# Patient Record
Sex: FEMALE | Race: WHITE | NOT HISPANIC OR LATINO | Employment: FULL TIME | URBAN - METROPOLITAN AREA
[De-identification: names, ages, dates, MRNs, and addresses within clinical notes are randomized per-mention and may not be internally consistent; named-entity substitution may affect disease eponyms.]

---

## 2023-05-31 ENCOUNTER — TRANSCRIBE ORDERS (OUTPATIENT)
Dept: ADMINISTRATIVE | Facility: HOSPITAL | Age: 38
End: 2023-05-31

## 2023-05-31 ENCOUNTER — HOSPITAL ENCOUNTER (OUTPATIENT)
Dept: CARDIOLOGY | Facility: HOSPITAL | Age: 38
Discharge: HOME | End: 2023-05-31

## 2023-05-31 ENCOUNTER — LAB (OUTPATIENT)
Dept: LAB | Facility: HOSPITAL | Age: 38
End: 2023-05-31

## 2023-05-31 DIAGNOSIS — Z01.818 PRE-OP TESTING: ICD-10-CM

## 2023-05-31 DIAGNOSIS — M25.562 LEFT KNEE PAIN, UNSPECIFIED CHRONICITY: Primary | ICD-10-CM

## 2023-05-31 DIAGNOSIS — M25.562 LEFT KNEE PAIN, UNSPECIFIED CHRONICITY: ICD-10-CM

## 2023-05-31 LAB
ANION GAP SERPL CALCULATED.3IONS-SCNC: 11.7 MMOL/L (ref 5–15)
BUN SERPL-MCNC: 6 MG/DL (ref 6–20)
BUN/CREAT SERPL: 7.3 (ref 7–25)
CALCIUM SPEC-SCNC: 10 MG/DL (ref 8.6–10.5)
CHLORIDE SERPL-SCNC: 101 MMOL/L (ref 98–107)
CO2 SERPL-SCNC: 28.3 MMOL/L (ref 22–29)
CREAT SERPL-MCNC: 0.82 MG/DL (ref 0.57–1)
DEPRECATED RDW RBC AUTO: 40 FL (ref 37–54)
EGFRCR SERPLBLD CKD-EPI 2021: 94.6 ML/MIN/1.73
ERYTHROCYTE [DISTWIDTH] IN BLOOD BY AUTOMATED COUNT: 12.5 % (ref 12.3–15.4)
GLUCOSE SERPL-MCNC: 120 MG/DL (ref 65–99)
HBA1C MFR BLD: 5.9 % (ref 4.8–5.6)
HCT VFR BLD AUTO: 42.9 % (ref 34–46.6)
HGB BLD-MCNC: 13.9 G/DL (ref 12–15.9)
MCH RBC QN AUTO: 28.5 PG (ref 26.6–33)
MCHC RBC AUTO-ENTMCNC: 32.4 G/DL (ref 31.5–35.7)
MCV RBC AUTO: 88.1 FL (ref 79–97)
PLATELET # BLD AUTO: 428 10*3/MM3 (ref 140–450)
PMV BLD AUTO: 8.8 FL (ref 6–12)
POTASSIUM SERPL-SCNC: 4.3 MMOL/L (ref 3.5–5.2)
QT INTERVAL: 363 MS
RBC # BLD AUTO: 4.87 10*6/MM3 (ref 3.77–5.28)
SODIUM SERPL-SCNC: 141 MMOL/L (ref 136–145)
WBC NRBC COR # BLD: 6.65 10*3/MM3 (ref 3.4–10.8)

## 2023-05-31 PROCEDURE — 83036 HEMOGLOBIN GLYCOSYLATED A1C: CPT

## 2023-05-31 PROCEDURE — 80048 BASIC METABOLIC PNL TOTAL CA: CPT

## 2023-05-31 PROCEDURE — 85027 COMPLETE CBC AUTOMATED: CPT

## 2023-05-31 PROCEDURE — 36415 COLL VENOUS BLD VENIPUNCTURE: CPT

## 2023-05-31 PROCEDURE — 93005 ELECTROCARDIOGRAM TRACING: CPT | Performed by: ORTHOPAEDIC SURGERY

## 2023-08-14 ENCOUNTER — OFFICE VISIT (OUTPATIENT)
Dept: OBSTETRICS AND GYNECOLOGY | Facility: CLINIC | Age: 38
End: 2023-08-14
Payer: COMMERCIAL

## 2023-08-14 VITALS
BODY MASS INDEX: 37.74 KG/M2 | SYSTOLIC BLOOD PRESSURE: 150 MMHG | DIASTOLIC BLOOD PRESSURE: 74 MMHG | HEIGHT: 68 IN | WEIGHT: 249 LBS

## 2023-08-14 DIAGNOSIS — Z01.419 ENCOUNTER FOR GYNECOLOGICAL EXAMINATION WITHOUT ABNORMAL FINDING: Primary | ICD-10-CM

## 2023-08-14 RX ORDER — DICYCLOMINE HCL 20 MG
20 TABLET ORAL
COMMUNITY
Start: 2023-04-11

## 2023-08-14 RX ORDER — BUPROPION HYDROCHLORIDE 150 MG/1
150 TABLET ORAL DAILY
COMMUNITY
Start: 2023-07-03

## 2023-08-14 RX ORDER — VITAMIN A ACETATE, BETA CAROTENE, ASCORBIC ACID, CHOLECALCIFEROL, .ALPHA.-TOCOPHEROL ACETATE, DL-, THIAMINE MONONITRATE, RIBOFLAVIN, NIACINAMIDE, PYRIDOXINE HYDROCHLORIDE, FOLIC ACID, CYANOCOBALAMIN, CALCIUM CARBONATE, FERROUS FUMARATE, ZINC OXIDE, CUPRIC OXIDE 3080; 12; 120; 400; 1; 1.84; 3; 20; 22; 920; 25; 200; 27; 10; 2 [IU]/1; UG/1; MG/1; [IU]/1; MG/1; MG/1; MG/1; MG/1; MG/1; [IU]/1; MG/1; MG/1; MG/1; MG/1; MG/1
TABLET, FILM COATED ORAL DAILY
COMMUNITY

## 2023-08-14 RX ORDER — PANTOPRAZOLE SODIUM 20 MG/1
20 TABLET, DELAYED RELEASE ORAL DAILY
COMMUNITY
Start: 2021-04-01

## 2023-08-14 RX ORDER — NATEGLINIDE 120 MG/1
TABLET ORAL
COMMUNITY
Start: 2014-01-14

## 2023-08-14 RX ORDER — KETOCONAZOLE 20 MG/G
CREAM TOPICAL
COMMUNITY

## 2023-08-14 RX ORDER — ETHYNODIOL DIACETATE AND ETHINYL ESTRADIOL 1 MG-35MCG
1 KIT ORAL DAILY
Qty: 84 TABLET | Refills: 3 | Status: SHIPPED | OUTPATIENT
Start: 2023-08-14

## 2023-08-14 RX ORDER — DEXTROAMPHETAMINE SACCHARATE, AMPHETAMINE ASPARTATE, DEXTROAMPHETAMINE SULFATE, AND AMPHETAMINE SULFATE 7.5; 7.5; 7.5; 7.5 MG/1; MG/1; MG/1; MG/1
30 TABLET ORAL 2 TIMES DAILY
COMMUNITY
Start: 2023-03-12

## 2023-08-14 RX ORDER — LANCETS 30 GAUGE
EACH MISCELLANEOUS
COMMUNITY

## 2023-08-14 RX ORDER — PIOGLITAZONEHYDROCHLORIDE 45 MG/1
1 TABLET ORAL DAILY
COMMUNITY
Start: 2020-07-01

## 2023-08-14 RX ORDER — FERROUS SULFATE 325(65) MG
325 TABLET ORAL DAILY
COMMUNITY

## 2023-08-14 RX ORDER — EXENATIDE 2 MG/.85ML
INJECTION, SUSPENSION, EXTENDED RELEASE SUBCUTANEOUS
COMMUNITY
Start: 2018-09-01

## 2023-08-14 RX ORDER — AMLODIPINE AND BENAZEPRIL HYDROCHLORIDE 10; 40 MG/1; MG/1
1 CAPSULE ORAL DAILY
COMMUNITY
Start: 2019-01-01

## 2023-08-14 RX ORDER — BUDESONIDE AND FORMOTEROL FUMARATE DIHYDRATE 80; 4.5 UG/1; UG/1
2 AEROSOL RESPIRATORY (INHALATION) 2 TIMES DAILY
COMMUNITY

## 2023-08-14 RX ORDER — ETHYNODIOL DIACETATE AND ETHINYL ESTRADIOL 1 MG-35MCG
1 KIT ORAL DAILY
COMMUNITY
End: 2023-08-14 | Stop reason: SDUPTHER

## 2023-08-14 NOTE — PROGRESS NOTES
GYN Annual Exam     CC- Here for annual exam.     Lavern Rehman is a 38 y.o. female who presents for annual well woman exam. Periods are regular every 28-30 days, lasting a few days. Dysmenorrhea:none. Cyclic symptoms include none. No intermenstrual bleeding, spotting, or discharge.    Patient is a former patient of mine.  We discussed her other medical history and her future desire to come off oral contraceptives and begin pregnancy efforts.  She states that she is already taking a prenatal vitamin.  She has made a nice effort at weight loss and has lost 40 pounds, somewhat setback by recent left knee surgery.  We discussed her metformin use and that that is safe and I would have her continue on that and she states that her recent hemoglobin A1c is gone down from 9.5 range to 5.6 range.  We discussed her chronic hypertension and she has already discussed this with her primary care physician and we will work on getting onto a different antihypertensive from her current ACE inhibitor calcium channel blocker combination.  Before she actually makes effort toward conception.  She has already talked to her mental health provider and will come off of her Adderall and her Wellbutrin before making any pregnancy efforts and I told her that these issues could certainly be revisited as the pregnancy progresses.  She presently wants to stay on her current oral contraceptive and may not be starting pregnancy efforts for about another 6 months.    OB History          1    Para        Term                AB   1    Living             SAB   1    IAB        Ectopic        Molar        Multiple        Live Births                    Current contraception: OCP (estrogen/progesterone)  History of abnormal Pap smear: no  Family history of uterine, colon or ovarian cancer: no  History of abnormal mammogram: no  Family history of breast cancer: yes - maternal grandmother  Last Pap :     Past Medical History:   Diagnosis  Date    Diabetes     TERE (generalized anxiety disorder)     GERD (gastroesophageal reflux disease)     HTN (hypertension)        Past Surgical History:   Procedure Laterality Date    DILATATION AND CURETTAGE      KNEE SURGERY      LAPAROSCOPIC CHOLECYSTECTOMY      SHOULDER SURGERY           Current Outpatient Medications:     Adderall 30 MG tablet, Take 1 tablet by mouth 2 (Two) Times a Day., Disp: , Rfl:     amLODIPine-benazepril (LOTREL) 10-40 MG per capsule, Take 1 capsule by mouth Daily., Disp: , Rfl:     budesonide-formoterol (SYMBICORT) 80-4.5 MCG/ACT inhaler, Inhale 2 puffs 2 (Two) Times a Day., Disp: , Rfl:     buPROPion XL (WELLBUTRIN XL) 150 MG 24 hr tablet, Take 1 tablet by mouth Daily., Disp: , Rfl:     dicyclomine (BENTYL) 20 MG tablet, Take 1 tablet by mouth., Disp: , Rfl:     exenatide er (Bydureon BCise) 2 MG/0.85ML auto-injector injection, INJECT 2 MG INTO THE SKIN ONCE A WEEK., Disp: , Rfl:     ferrous sulfate 325 (65 FE) MG tablet, Take 1 tablet by mouth Daily., Disp: , Rfl:     glucose blood test strip, OneTouch Ultra Test strips  1 STRIP BY OTHER ROUTE 2 (TWO) TIMES DAILY., Disp: , Rfl:     Kelnor 1/35 1-35 MG-MCG per tablet, Take 1 tablet by mouth Daily., Disp: 84 tablet, Rfl: 3    ketoconazole (NIZORAL) 2 % cream, prn, Disp: , Rfl:     metFORMIN (GLUCOPHAGE) 850 MG tablet, Take 1 tablet by mouth Daily With Breakfast., Disp: , Rfl:     nateglinide (STARLIX) 120 MG tablet, Take  by mouth., Disp: , Rfl:     OneTouch Delica Lancets 30G misc, OneTouch Delica Lancets 33 gauge  INJECT TWICE DAILY WITH STRIPS, Disp: , Rfl:     pantoprazole (PROTONIX) 20 MG EC tablet, Take 1 tablet by mouth Daily., Disp: , Rfl:     pioglitazone (ACTOS) 45 MG tablet, Take 1 tablet by mouth Daily., Disp: , Rfl:     prenatal vitamins (PRENATAL 27-1) 27-1 MG tablet tablet, Take  by mouth Daily., Disp: , Rfl:     PROVENTIL  (90 BASE) MCG/ACT inhaler, INHALE 1 PUFF EVERY 4 HOURS AS NEEDED., Disp: 6.7 inhaler, Rfl:  "0    Allergies   Allergen Reactions    Shellfish-Derived Products Hives     N/v.    Shellfish Allergy Hives    Oxycodone-Acetaminophen Hives, Itching and Rash       Social History     Tobacco Use    Smoking status: Never    Smokeless tobacco: Never   Vaping Use    Vaping Use: Every day    Substances: Nicotine, Flavoring    Devices: Disposable   Substance Use Topics    Alcohol use: Not Currently    Drug use: Not Currently       Family History   Problem Relation Age of Onset    Breast cancer Maternal Grandmother     Ovarian cancer Neg Hx     Uterine cancer Neg Hx     Colon cancer Neg Hx        Review of Systems   Constitutional:  Negative for fatigue and fever.   Respiratory:  Negative for cough.    Genitourinary:  Negative for menstrual problem and pelvic pain.     /74   Ht 172.7 cm (68\")   Wt 113 kg (249 lb)   LMP 07/22/2023   BMI 37.86 kg/mý     Physical Exam  Genitourinary:      Bladder and urethral meatus normal.      No lesions in the vagina.      Right Labia: No lesions.     Left Labia: No lesions.     No vaginal discharge, tenderness or bleeding.      No vaginal prolapse present.     No vaginal atrophy present.       Right Adnexa: not tender, not full and no mass present.     Left Adnexa: not tender, not full and no mass present.     No cervical motion tenderness, discharge, friability or lesion.      Uterus is not enlarged or fixed.      No uterine mass detected.     Uterus is midaxial.      Uterus is not absent.  Breasts:     Right: No mass, nipple discharge, skin change or tenderness.      Left: No mass, nipple discharge, skin change or tenderness.   Neck:      Thyroid: No thyroid mass or thyromegaly.   Abdominal:      General: Abdomen is flat.      Palpations: Abdomen is soft. There is no mass.      Tenderness: There is no abdominal tenderness.   Neurological:      Mental Status: She is alert.   Vitals reviewed.             Assessment     1) GYN annual well woman exam.  Desiring pregnancy in the " near future.  See HPI for discussion.  2) chronic hypertension  3) type 2 diabetes, well controlled on metformin  4) attention deficit disorder currently on Adderall and Wellbutrin     Plan     1) Breast Health - Clinical breast exam yearly, Discussed [employer] cancer society recommendations for breast cancer screening, and Self breast awareness monthly  2) Pap -not indicated as it was done in 2022  3) Smoking status-negative  4) Encouraged to be wary of information obtained via social media and internet based on source and search.  5) Follow up prn and one year.       Darius Uribe MD   8/14/2023  10:42 EDT

## 2023-08-16 ENCOUNTER — PATIENT ROUNDING (BHMG ONLY) (OUTPATIENT)
Dept: OBSTETRICS AND GYNECOLOGY | Facility: CLINIC | Age: 38
End: 2023-08-16
Payer: COMMERCIAL

## 2023-08-16 ENCOUNTER — PATIENT MESSAGE (OUTPATIENT)
Dept: OBSTETRICS AND GYNECOLOGY | Facility: CLINIC | Age: 38
End: 2023-08-16
Payer: COMMERCIAL

## 2023-08-16 NOTE — PROGRESS NOTES
My chart message has been sent to the patient for PATIENT ROUNDING with [facility].

## 2024-03-25 DIAGNOSIS — N92.6 MISSED MENSES: Primary | ICD-10-CM

## 2024-03-26 ENCOUNTER — TELEPHONE (OUTPATIENT)
Dept: OBSTETRICS AND GYNECOLOGY | Facility: CLINIC | Age: 39
End: 2024-03-26
Payer: COMMERCIAL

## 2024-03-26 LAB — HCG INTACT+B SERPL-ACNC: 500 MIU/ML

## 2024-04-01 DIAGNOSIS — N92.6 MISSED MENSES: Primary | ICD-10-CM

## 2024-04-02 LAB — HCG INTACT+B SERPL-ACNC: NORMAL MIU/ML

## 2024-04-15 ENCOUNTER — INITIAL PRENATAL (OUTPATIENT)
Dept: OBSTETRICS AND GYNECOLOGY | Facility: CLINIC | Age: 39
End: 2024-04-15
Payer: COMMERCIAL

## 2024-04-15 VITALS — SYSTOLIC BLOOD PRESSURE: 124 MMHG | WEIGHT: 270.2 LBS | BODY MASS INDEX: 41.08 KG/M2 | DIASTOLIC BLOOD PRESSURE: 74 MMHG

## 2024-04-15 DIAGNOSIS — Z34.91 INITIAL OBSTETRIC VISIT IN FIRST TRIMESTER: Primary | ICD-10-CM

## 2024-04-15 DIAGNOSIS — O09.519 ADVANCED MATERNAL AGE, PRIMIGRAVIDA, ANTEPARTUM: ICD-10-CM

## 2024-04-15 DIAGNOSIS — O16.9 HYPERTENSION AFFECTING PREGNANCY, ANTEPARTUM: ICD-10-CM

## 2024-04-15 DIAGNOSIS — Z3A.01 7 WEEKS GESTATION OF PREGNANCY: ICD-10-CM

## 2024-04-15 DIAGNOSIS — N91.2 AMENORRHEA: ICD-10-CM

## 2024-04-15 DIAGNOSIS — O24.919 DIABETES MELLITUS COMPLICATING PREGNANCY, UNSPECIFIED TRIMESTER: ICD-10-CM

## 2024-04-15 DIAGNOSIS — O99.210 OBESITY IN PREGNANCY, ANTEPARTUM: ICD-10-CM

## 2024-04-15 LAB
GLUCOSE UR STRIP-MCNC: NEGATIVE MG/DL
PROT UR STRIP-MCNC: NEGATIVE MG/DL

## 2024-04-15 PROCEDURE — 0501F PRENATAL FLOW SHEET: CPT | Performed by: OBSTETRICS & GYNECOLOGY

## 2024-04-15 RX ORDER — ASPIRIN 81 MG/1
81 TABLET, CHEWABLE ORAL DAILY
Qty: 60 TABLET | Refills: 3 | Status: SHIPPED | OUTPATIENT
Start: 2024-04-15

## 2024-04-15 NOTE — PROGRESS NOTES
Initial ob visit     CC- Here to initiate prenatal care.    Lavern Rehman is being seen today for her first obstetrical visit.  She is a 39 y.o.    7w1d gestation.   Patient with no complaints today.  She does have some nausea and some fatigue but is mild.  She is sure about her menses but did have some early bleeding.  She is on metformin for type 2 diabetes and reports good glucose control with postprandial at about 120 and fasting this morning of 95.  She has recently been changed to amlodipine by her primary care provider knowing that she was planning pregnancy.  We discussed risks associated with chronic hypertension, advanced maternal age and type 2 diabetes.  We discussed routine follow-up and did recommend maternal-fetal medicine consultation given the above risk factors.  We discussed closer monitoring in the third trimester with usually weekly visits with some form of  testing beginning at 32 weeks.    # 1 - Date: None, Sex: None, Weight: None, GA: None, Type: None, Apgar1: None, Apgar5: None, Living: None, Birth Comments: None    # 2 - Date: None, Sex: None, Weight: None, GA: None, Type: None, Apgar1: None, Apgar5: None, Living: None, Birth Comments: None      Current obstetric complaints : Fatigue      Prior obstetric issues, potential pregnancy concerns: Advanced maternal age, chronic hypertension, type 2 diabetes  Family history of genetic issues (includes FOB): Negative  Prior infections concerning in pregnancy (Rash, fever in last 2 weeks): Negative  Varicella Hx -unknown  Prior testing for Cystic Fibrosis Carrier or Sickle Cell Trait-unknown  Prepregnancy BMI - Body mass index is 41.08 kg/m².  Hx of HSV for patient or partner : Negative    Past Medical History:   Diagnosis Date    Diabetes     TERE (generalized anxiety disorder)     GERD (gastroesophageal reflux disease)     HTN (hypertension)     Hypertension        Past Surgical History:   Procedure Laterality Date    DILATATION  AND CURETTAGE      KNEE SURGERY      LAPAROSCOPIC CHOLECYSTECTOMY      SHOULDER SURGERY           Current Outpatient Medications:     budesonide-formoterol (SYMBICORT) 80-4.5 MCG/ACT inhaler, Inhale 2 puffs 2 (Two) Times a Day., Disp: , Rfl:     ferrous sulfate 325 (65 FE) MG tablet, Take 1 tablet by mouth Daily., Disp: , Rfl:     glucose blood test strip, OneTouch Ultra Test strips  1 STRIP BY OTHER ROUTE 2 (TWO) TIMES DAILY., Disp: , Rfl:     metFORMIN (GLUCOPHAGE) 850 MG tablet, Take 1 tablet by mouth Daily With Breakfast., Disp: , Rfl:     OneTouch Delica Lancets 30G misc, OneTouch Delica Lancets 33 gauge  INJECT TWICE DAILY WITH STRIPS, Disp: , Rfl:     pantoprazole (PROTONIX) 20 MG EC tablet, Take 1 tablet by mouth Daily., Disp: , Rfl:     pioglitazone (ACTOS) 45 MG tablet, Take 1 tablet by mouth Daily., Disp: , Rfl:     prenatal vitamins (PRENATAL 27-1) 27-1 MG tablet tablet, Take  by mouth Daily., Disp: , Rfl:     PROVENTIL  (90 BASE) MCG/ACT inhaler, INHALE 1 PUFF EVERY 4 HOURS AS NEEDED., Disp: 6.7 inhaler, Rfl: 0    Adderall 30 MG tablet, Take 1 tablet by mouth 2 (Two) Times a Day., Disp: , Rfl:     amLODIPine-benazepril (LOTREL) 10-40 MG per capsule, Take 1 capsule by mouth Daily., Disp: , Rfl:     aspirin 81 MG chewable tablet, Chew 1 tablet Daily., Disp: 60 tablet, Rfl: 3    buPROPion XL (WELLBUTRIN XL) 150 MG 24 hr tablet, Take 1 tablet by mouth Daily., Disp: , Rfl:     dicyclomine (BENTYL) 20 MG tablet, Take 1 tablet by mouth. (Patient not taking: Reported on 4/15/2024), Disp: , Rfl:     exenatide er (Bydureon BCise) 2 MG/0.85ML auto-injector injection, INJECT 2 MG INTO THE SKIN ONCE A WEEK., Disp: , Rfl:     Kelnor 1/35 1-35 MG-MCG per tablet, Take 1 tablet by mouth Daily., Disp: 84 tablet, Rfl: 3    ketoconazole (NIZORAL) 2 % cream, prn (Patient not taking: Reported on 4/15/2024), Disp: , Rfl:     nateglinide (STARLIX) 120 MG tablet, Take  by mouth., Disp: , Rfl:     Allergies   Allergen  Reactions    Shellfish-Derived Products Hives     N/v.    Shellfish Allergy Hives    Oxycodone-Acetaminophen Hives, Itching and Rash       Social History     Socioeconomic History    Marital status:    Tobacco Use    Smoking status: Never    Smokeless tobacco: Never   Vaping Use    Vaping status: Former   Substance and Sexual Activity    Alcohol use: Not Currently    Drug use: Not Currently    Sexual activity: Yes     Partners: Male     Birth control/protection: Birth control pill       Family History   Problem Relation Age of Onset    Breast cancer Maternal Grandmother     Ovarian cancer Neg Hx     Uterine cancer Neg Hx     Colon cancer Neg Hx        Review of systems     Constitutional : Nausea, fatigue mild   : Vaginal bleeding, cramping negative  Breast Tenderness : Mild  All other systems reviewed and are negative       Objective    /74   Wt 123 kg (270 lb 3.2 oz)   LMP 02/25/2024 (Exact Date)   BMI 41.08 kg/m²       General Appearance:    Alert, cooperative, in no acute distress, habitus obese   Head:    Normocephalic, without obvious abnormality, atraumatic   Eyes:            Lids and lashes normal, conjunctivae and sclerae normal, no   icterus, no pallor, corneas clear   Ears:    Ears appear intact with no abnormalities noted       Neck:   no thyromegaly, no mass.   Back:                         Lungs:     Clear to auscultation,respirations regular, even and     unlabored    Heart:    Regular rhythm and normal rate, normal S1 and S2, no            murmur, no gallop, no rub, no click   Breast Exam:    No masses, No nipple discharge   Abdomen:     Normal bowel sounds, no masses, no organomegaly, soft        non-tender, non-distended, no guarding, no rebound                 tenderness   Genitalia:       Extremities:   Moves all extremities well, no edema, no cyanosis, no              redness   Pulses:   Pulses palpable and equal bilaterally   Skin:   No bleeding, bruising or rash   Lymph  nodes:   No palpable adenopathy   Neurologic:   Sensation intact, A&O times 3      Assessment & Plan     Diagnoses and all orders for this visit:    1. Initial obstetric visit in first trimester (Primary)    2. 7 weeks gestation of pregnancy  -     POC Urinalysis Dipstick  -     OB Panel With HIV  -     Hemoglobin A1c  -     Chlamydia trachomatis, Neisseria gonorrhoeae, Trichomonas vaginalis, PCR - Urine, Urine, Clean Catch    3. Advanced maternal age, primigravida, antepartum    4. Diabetes mellitus complicating pregnancy, unspecified trimester    5. Hypertension affecting pregnancy, antepartum    6. Amenorrhea  -     POC Urinalysis Dipstick    7. Obesity in pregnancy, antepartum    Other orders  -     aspirin 81 MG chewable tablet; Chew 1 tablet Daily.  Dispense: 60 tablet; Refill: 3        1) Pregnancy at 7w1d  2) will get prenatal panel today along with hemoglobin A1c.  Will get her started toward maternal-fetal medicine consultation due to the above risk factors.  Will get dating ultrasound today as well.         Activity recommendation : 150 minutes/week of moderate intensity aerobic activity unless we limit for bleeding, hypertension or other pregnancy complication   Patient is on Prenatal vitamins  Problem list reviewed and updated.  Reviewed routine prenatal care with the office to include but not limited to   Zika (travel restrictions/ok to use insect repellant), not to changing cat litter, food restrictions, avoidance of alcohol, tobacco and drugs and saunas/hot tubs.   All questions answered.     Darius Uribe MD   4/15/2024  11:17 EDT

## 2024-04-16 LAB
ABO GROUP BLD: ABNORMAL
BASOPHILS # BLD AUTO: 0.1 X10E3/UL (ref 0–0.2)
BASOPHILS NFR BLD AUTO: 1 %
BLD GP AB SCN SERPL QL: NEGATIVE
EOSINOPHIL # BLD AUTO: 0.1 X10E3/UL (ref 0–0.4)
EOSINOPHIL NFR BLD AUTO: 1 %
ERYTHROCYTE [DISTWIDTH] IN BLOOD BY AUTOMATED COUNT: 12.6 % (ref 11.7–15.4)
HBA1C MFR BLD: 6.5 % (ref 4.8–5.6)
HBV SURFACE AG SERPL QL IA: NEGATIVE
HCT VFR BLD AUTO: 38.3 % (ref 34–46.6)
HCV IGG SERPL QL IA: NON REACTIVE
HGB BLD-MCNC: 12.6 G/DL (ref 11.1–15.9)
HIV 1+2 AB+HIV1 P24 AG SERPL QL IA: NON REACTIVE
IMM GRANULOCYTES # BLD AUTO: 0.1 X10E3/UL (ref 0–0.1)
IMM GRANULOCYTES NFR BLD AUTO: 1 %
LYMPHOCYTES # BLD AUTO: 2.3 X10E3/UL (ref 0.7–3.1)
LYMPHOCYTES NFR BLD AUTO: 28 %
MCH RBC QN AUTO: 29.5 PG (ref 26.6–33)
MCHC RBC AUTO-ENTMCNC: 32.9 G/DL (ref 31.5–35.7)
MCV RBC AUTO: 90 FL (ref 79–97)
MONOCYTES # BLD AUTO: 0.5 X10E3/UL (ref 0.1–0.9)
MONOCYTES NFR BLD AUTO: 7 %
NEUTROPHILS # BLD AUTO: 4.9 X10E3/UL (ref 1.4–7)
NEUTROPHILS NFR BLD AUTO: 62 %
PLATELET # BLD AUTO: 385 X10E3/UL (ref 150–450)
RBC # BLD AUTO: 4.27 X10E6/UL (ref 3.77–5.28)
RH BLD: POSITIVE
RPR SER QL: NON REACTIVE
RUBV IGG SERPL IA-ACNC: <0.9 INDEX
WBC # BLD AUTO: 7.9 X10E3/UL (ref 3.4–10.8)

## 2024-04-17 LAB
C TRACH RRNA SPEC QL NAA+PROBE: NEGATIVE
N GONORRHOEA RRNA SPEC QL NAA+PROBE: NEGATIVE
T VAGINALIS RRNA SPEC QL NAA+PROBE: NEGATIVE

## 2024-04-18 LAB
AMPHETAMINES UR QL SCN: NEGATIVE NG/ML
BARBITURATES UR QL SCN: NEGATIVE NG/ML
BENZODIAZ UR QL: NEGATIVE NG/ML
BZE UR QL: NEGATIVE NG/ML
CANNABINOIDS UR QL SCN: NEGATIVE NG/ML
METHADONE UR QL SCN: NEGATIVE NG/ML
OPIATES UR QL: NEGATIVE NG/ML
PCP UR QL SCN: NEGATIVE NG/ML
PROPOXYPH UR QL SCN: NEGATIVE NG/ML

## 2024-04-18 RX ORDER — NITROFURANTOIN 25; 75 MG/1; MG/1
100 CAPSULE ORAL 2 TIMES DAILY
Qty: 10 CAPSULE | Refills: 0 | Status: SHIPPED | OUTPATIENT
Start: 2024-04-18 | End: 2024-04-23

## 2024-04-18 NOTE — PROGRESS NOTES
Lavern, your recent urine culture is consistent with a urinary tract infection.  The final culture result is still pending.  I have sent an antibiotic in for you to start taking twice daily for 5 days.

## 2024-04-20 LAB
BACTERIA UR CULT: ABNORMAL
BACTERIA UR CULT: ABNORMAL
OTHER ANTIBIOTIC SUSC ISLT: ABNORMAL

## 2024-04-23 ENCOUNTER — TELEPHONE (OUTPATIENT)
Dept: OBSTETRICS AND GYNECOLOGY | Facility: CLINIC | Age: 39
End: 2024-04-23
Payer: COMMERCIAL

## 2024-04-23 RX ORDER — LANCETS 30 GAUGE
1 EACH MISCELLANEOUS TAKE AS DIRECTED
Qty: 200 EACH | Refills: 10 | Status: SHIPPED | OUTPATIENT
Start: 2024-04-23

## 2024-04-23 NOTE — TELEPHONE ENCOUNTER
Call placed to Lavern to review [facility] diabetes management. Pt reports she is currently not checking blood sugars at this time. Pt notes she has worked very hard on dietary modifications to bring her A1C down but is not checking blood sugars regularly. Notes that when her blood sugar is elevated she feels symptomatic. Discussed with pt MFM management and checking blood sugars 7 times a day: before meals, one hour after meals and before bedtime snack. Pt agreeable to this adjustment and will start to check blood sugars more regularly.     Discussed carb goals and eating every 2-3 hours with 30g carbs for breakfast, 45g carbs for lunch and dinner and 10-15g carbs with snacks in between meals and at bedtime. Pt verbalized understanding. Glucose goals of 60-90 before meals and  one hour after meals were reviewed. Discussed with patient that if glucose values are consistently outside of the targeted range, [facility] will discuss adjusting her medications. Pt currently taking Metformin 850mg BID and Actos 45mg daily. Encouraged pt to start checking blood sugars 7 times a day and bring glucose logs to her upcoming appointment. Lavern verbalized understanding.     [employer] (acquired by [employer] in 2014) message sent to patient with [facility] glucose log attached. Will provide patient with [facility] folder with diabetes packet, hypoglycemia protocol and extra glucose logs at time of appointment. All questions answered at time of call and encouraged patient to call this RN with any additional questions prior to her appointment. [facility] looks forward to seeing pt on 5/20. Will do weekly glucose checks every Monday.

## 2024-04-29 ENCOUNTER — REMOTE PATIENT MONITORING (OUTPATIENT)
Dept: OBSTETRICS AND GYNECOLOGY | Facility: CLINIC | Age: 39
End: 2024-04-29
Payer: COMMERCIAL

## 2024-04-29 ENCOUNTER — DOCUMENTATION (OUTPATIENT)
Dept: OBSTETRICS AND GYNECOLOGY | Facility: CLINIC | Age: 39
End: 2024-04-29

## 2024-04-29 RX ORDER — METFORMIN HYDROCHLORIDE 500 MG/1
1000 TABLET, EXTENDED RELEASE ORAL
Qty: 120 TABLET | Refills: 8 | Status: SHIPPED | OUTPATIENT
Start: 2024-04-29

## 2024-04-29 RX ORDER — GLYBURIDE 2.5 MG/1
2.5 TABLET ORAL NIGHTLY
Qty: 30 TABLET | Refills: 8 | Status: SHIPPED | OUTPATIENT
Start: 2024-04-29 | End: 2025-01-24

## 2024-04-29 NOTE — PROGRESS NOTES
Weekly MFM glucose log evaluation. The following adjustments were made:    Metformin: 1000mg BID (breakfast and dinner)  Glyburide: 2.5mg QHS    Call placed to Sandor ayon to review new medication recommendations. No answer at time of calls, voicemail left with call back number: [phone]. [employer] message sent explaining new medications. MFM will review glucose logs again on 5/6.

## 2024-04-30 ENCOUNTER — TRANSCRIBE ORDERS (OUTPATIENT)
Dept: ULTRASOUND IMAGING | Facility: HOSPITAL | Age: 39
End: 2024-04-30
Payer: COMMERCIAL

## 2024-04-30 DIAGNOSIS — O09.529 AMA (ADVANCED MATERNAL AGE) MULTIGRAVIDA 35+, UNSPECIFIED TRIMESTER: Primary | ICD-10-CM

## 2024-04-30 DIAGNOSIS — O16.9 HYPERTENSION AFFECTING PREGNANCY, ANTEPARTUM: ICD-10-CM

## 2024-04-30 DIAGNOSIS — O24.919 DIABETES MELLITUS AFFECTING PREGNANCY, UNSPECIFIED TRIMESTER: ICD-10-CM

## 2024-05-06 ENCOUNTER — TELEPHONE (OUTPATIENT)
Dept: OBSTETRICS AND GYNECOLOGY | Facility: CLINIC | Age: 39
End: 2024-05-06
Payer: COMMERCIAL

## 2024-05-10 ENCOUNTER — DOCUMENTATION (OUTPATIENT)
Dept: OBSTETRICS AND GYNECOLOGY | Facility: CLINIC | Age: 39
End: 2024-05-10
Payer: COMMERCIAL

## 2024-05-13 ENCOUNTER — ROUTINE PRENATAL (OUTPATIENT)
Dept: OBSTETRICS AND GYNECOLOGY | Facility: CLINIC | Age: 39
End: 2024-05-13
Payer: COMMERCIAL

## 2024-05-13 VITALS — WEIGHT: 278 LBS | SYSTOLIC BLOOD PRESSURE: 145 MMHG | DIASTOLIC BLOOD PRESSURE: 86 MMHG | BODY MASS INDEX: 42.27 KG/M2

## 2024-05-13 DIAGNOSIS — Z34.81 PRENATAL CARE, SUBSEQUENT PREGNANCY IN FIRST TRIMESTER: ICD-10-CM

## 2024-05-13 DIAGNOSIS — O24.919 DIABETES MELLITUS COMPLICATING PREGNANCY, UNSPECIFIED TRIMESTER: ICD-10-CM

## 2024-05-13 DIAGNOSIS — Z3A.11 11 WEEKS GESTATION OF PREGNANCY: Primary | ICD-10-CM

## 2024-05-13 DIAGNOSIS — O09.519 ADVANCED MATERNAL AGE, PRIMIGRAVIDA, ANTEPARTUM: ICD-10-CM

## 2024-05-13 DIAGNOSIS — O34.81 OVARIAN CYST AFFECTING PREGNANCY IN FIRST TRIMESTER, ANTEPARTUM: ICD-10-CM

## 2024-05-13 DIAGNOSIS — N83.209 OVARIAN CYST AFFECTING PREGNANCY IN FIRST TRIMESTER, ANTEPARTUM: ICD-10-CM

## 2024-05-13 DIAGNOSIS — O16.9 HYPERTENSION AFFECTING PREGNANCY, ANTEPARTUM: ICD-10-CM

## 2024-05-13 LAB
GLUCOSE UR STRIP-MCNC: NEGATIVE MG/DL
PROT UR STRIP-MCNC: NEGATIVE MG/DL

## 2024-05-13 PROCEDURE — 0502F SUBSEQUENT PRENATAL CARE: CPT | Performed by: OBSTETRICS & GYNECOLOGY

## 2024-05-13 RX ORDER — AMLODIPINE BESYLATE 10 MG/1
10 TABLET ORAL DAILY
COMMUNITY

## 2024-05-13 NOTE — PROGRESS NOTES
OB follow up     Lavern Rehman is a 39 y.o.  11w4d being seen today for her obstetrical visit.  Patient reports no complaints. Fetal movement: Too early.  No complaints.  No pain or vaginal bleeding.  Today's ultrasound shows the right simple cyst decreasing in size to about 5.2 cm x 4 cm.  There is a small area that looks consistent with possible subchorionic hematoma.  She is achieving better glucose control under the direction of the maternal-fetal medicine services.  She continues with her baby aspirin as well as the antihypertensive.    Her prenatal care is complicated by (and status): Obesity, chronic hypertension, type 2 diabetes, right ovarian cyst    Review of Systems  Cramping/contractions : Negative  Vaginal bleeding: Negative  Fetal movement too early    /86   Wt 126 kg (278 lb)   LMP 2024 (Exact Date)   BMI 42.27 kg/m²     FHT: 150 BPM   Uterine Size: size equals dates       Assessment    Diagnoses and all orders for this visit:    1. 11 weeks gestation of pregnancy (Primary)  -     POC Urinalysis Dipstick    2. Prenatal care, subsequent pregnancy in first trimester    3. Ovarian cyst affecting pregnancy in first trimester, antepartum    4. Advanced maternal age, primigravida, antepartum    5. Diabetes mellitus complicating pregnancy, unspecified trimester    6. Hypertension affecting pregnancy, antepartum        1) pregnancy at 11w4d         Plan    Reviewed this stage of pregnancy  Problem list updated   Follow up in 4 weeks.  Will also repeat ultrasound on that visit.  She has maternal-fetal medicine evaluation in about 1 week.    Darius Uribe MD   2024  12:22 EDT

## 2024-05-17 ENCOUNTER — DOCUMENTATION (OUTPATIENT)
Dept: OBSTETRICS AND GYNECOLOGY | Facility: CLINIC | Age: 39
End: 2024-05-17
Payer: COMMERCIAL

## 2024-05-17 LAB
CFDNA.FET/CFDNA.TOTAL SFR FETUS: NORMAL %
CITATION REF LAB TEST: NORMAL
FET 13+18+21+X+Y ANEUP PLAS.CFDNA: NEGATIVE
FET CHR 21 TS PLAS.CFDNA QL: NEGATIVE
FET SEX PLAS.CFDNA DOSAGE CFDNA: NORMAL
FET TS 13 RISK PLAS.CFDNA QL: NEGATIVE
FET TS 18 RISK WBC.DNA+CFDNA QL: NEGATIVE
GA EST FROM CONCEPTION DATE: NORMAL D
GESTATIONAL AGE > 9:: YES
LAB DIRECTOR NAME PROVIDER: NORMAL
LAB DIRECTOR NAME PROVIDER: NORMAL
LABORATORY COMMENT REPORT: NORMAL
LIMITATIONS OF THE TEST: NORMAL
NEGATIVE PREDICTIVE VALUE: NORMAL
NOTE: NORMAL
PERFORMANCE CHARACTERISTICS: NORMAL
POSITIVE PREDICTIVE VALUE: NORMAL
REF LAB TEST METHOD: NORMAL
TEST PERFORMANCE INFO SPEC: NORMAL

## 2024-05-17 NOTE — PROGRESS NOTES
Weekly MFM glucose log evaluation. No adjustments needed at this time. Patient to continue the following medication regimen:    Metformin: 1000mg BID  Glyburide 2.5mg HS    MFM will review glucose logs again on 5/20.

## 2024-05-20 ENCOUNTER — HOSPITAL ENCOUNTER (OUTPATIENT)
Dept: ULTRASOUND IMAGING | Facility: HOSPITAL | Age: 39
Discharge: HOME | End: 2024-05-20
Admitting: OBSTETRICS & GYNECOLOGY
Payer: COMMERCIAL

## 2024-05-20 ENCOUNTER — OFFICE VISIT (OUTPATIENT)
Dept: OBSTETRICS AND GYNECOLOGY | Facility: CLINIC | Age: 39
End: 2024-05-20
Payer: COMMERCIAL

## 2024-05-20 VITALS
BODY MASS INDEX: 42.89 KG/M2 | SYSTOLIC BLOOD PRESSURE: 132 MMHG | HEART RATE: 109 BPM | DIASTOLIC BLOOD PRESSURE: 60 MMHG | TEMPERATURE: 100 F | HEIGHT: 68 IN | WEIGHT: 283 LBS

## 2024-05-20 DIAGNOSIS — O24.919 DIABETES MELLITUS COMPLICATING PREGNANCY, UNSPECIFIED TRIMESTER: ICD-10-CM

## 2024-05-20 DIAGNOSIS — O09.529 AMA (ADVANCED MATERNAL AGE) MULTIGRAVIDA 35+, UNSPECIFIED TRIMESTER: ICD-10-CM

## 2024-05-20 DIAGNOSIS — O16.9 HYPERTENSION AFFECTING PREGNANCY, ANTEPARTUM: ICD-10-CM

## 2024-05-20 DIAGNOSIS — O09.519 ADVANCED MATERNAL AGE, PRIMIGRAVIDA, ANTEPARTUM: Primary | ICD-10-CM

## 2024-05-20 DIAGNOSIS — O24.919 DIABETES MELLITUS AFFECTING PREGNANCY, UNSPECIFIED TRIMESTER: ICD-10-CM

## 2024-05-20 PROBLEM — Q61.9 CONGENITAL CYSTIC KIDNEY DISEASE: Status: RESOLVED | Noted: 2024-05-20 | Resolved: 2024-05-20

## 2024-05-20 PROBLEM — E28.2 PCOS (POLYCYSTIC OVARIAN SYNDROME): Status: ACTIVE | Noted: 2019-06-13

## 2024-05-20 PROBLEM — E11.9 DIABETES MELLITUS: Status: ACTIVE | Noted: 2024-05-20

## 2024-05-20 PROBLEM — L73.2 HIDRADENITIS SUPPURATIVA: Status: ACTIVE | Noted: 2024-05-20

## 2024-05-20 PROBLEM — M75.02 ADHESIVE CAPSULITIS OF LEFT SHOULDER: Status: RESOLVED | Noted: 2022-09-08 | Resolved: 2024-05-20

## 2024-05-20 LAB — GLUCOSE BLDC GLUCOMTR-MCNC: 106 MG/DL (ref 70–130)

## 2024-05-20 PROCEDURE — 82948 REAGENT STRIP/BLOOD GLUCOSE: CPT

## 2024-05-20 PROCEDURE — 99214 OFFICE O/P EST MOD 30 MIN: CPT | Performed by: NURSE PRACTITIONER

## 2024-05-20 NOTE — PROGRESS NOTES
MATERNAL FETAL MEDICINE Consult Note    Dear Dr Darius Uribe MD:    Thank you for your kind referral of Lavern Rehman.  As you know, she is a 39 y.o. G 2 P 0010 at 12  4/7 weeks gestation (Estimated Date of Delivery: 11/28/24). This is a consult.     Her antepartum course is complicated by:  Chronic Hypertension  Type 2 DM  AMA    Aneuploidy Screening: NIPT - Negative    HPI: Today, she denies headache, blurry vision, RUQ pain. No vaginal bleeding, no contractions.     Review of History:  Past Medical History:   Diagnosis Date    Adhesive capsulitis of left shoulder 09/08/2022    Formatting of this note might be different from the original.   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    Diabetes     TERE (generalized anxiety disorder)     GERD (gastroesophageal reflux disease)     HTN (hypertension)     Hypertension      Past Surgical History:   Procedure Laterality Date    DILATATION AND CURETTAGE      KNEE SURGERY      LAPAROSCOPIC CHOLECYSTECTOMY      SHOULDER SURGERY       Social History     Socioeconomic History    Marital status:    Tobacco Use    Smoking status: Never    Smokeless tobacco: Never   Vaping Use    Vaping status: Former   Substance and Sexual Activity    Alcohol use: Not Currently    Drug use: Not Currently    Sexual activity: Yes     Partners: Male     Birth control/protection: Birth control pill     Family History   Problem Relation Age of Onset    Hypertension Father     Breast cancer Maternal Grandmother     Hypertension Maternal Grandfather     Ovarian cancer Neg Hx     Uterine cancer Neg Hx     Colon cancer Neg Hx       Allergies   Allergen Reactions    Shellfish-Derived Products Hives     N/v.    Shellfish Allergy Hives    Oxycodone-Acetaminophen Hives, Itching and Rash      Current Outpatient Medications on File Prior to Visit   Medication Sig Dispense Refill    amLODIPine (NORVASC) 10 MG tablet  Take 1 tablet by mouth Daily.      aspirin 81 MG chewable tablet Chew 1 tablet Daily. 60 tablet 3    budesonide-formoterol (SYMBICORT) 80-4.5 MCG/ACT inhaler Inhale 2 puffs 2 (Two) Times a Day.      ferrous sulfate 325 (65 FE) MG tablet Take 1 tablet by mouth Daily.      glucose blood test strip Use to check blood sugar 7 times a day. Patient has a One Touch Ultra 2 glucometer. 200 each 10    glyburide (DIAbeta) 2.5 MG tablet Take 1 tablet by mouth Every Night for 270 days. 30 tablet 8    ketoconazole (NIZORAL) 2 % cream prn      metFORMIN ER (GLUCOPHAGE-XR) 500 MG 24 hr tablet Take 2 tablets by mouth Daily With Breakfast & Dinner. 120 tablet 8    OneTouch Delica Lancets 30G misc 1 Lancet by Other route Take As Directed. Use to check blood sugar 7 times a day. 200 each 10    pantoprazole (PROTONIX) 20 MG EC tablet Take 1 tablet by mouth Daily.      prenatal vitamins (PRENATAL 27-1) 27-1 MG tablet tablet Take  by mouth Daily.      PROVENTIL  (90 BASE) MCG/ACT inhaler INHALE 1 PUFF EVERY 4 HOURS AS NEEDED. 6.7 inhaler 0    [DISCONTINUED] Adderall 30 MG tablet Take 1 tablet by mouth 2 (Two) Times a Day. (Patient not taking: Reported on 5/13/2024)      [DISCONTINUED] amLODIPine-benazepril (LOTREL) 10-40 MG per capsule Take 1 capsule by mouth Daily. (Patient not taking: Reported on 5/13/2024)      [DISCONTINUED] buPROPion XL (WELLBUTRIN XL) 150 MG 24 hr tablet Take 1 tablet by mouth Daily. (Patient not taking: Reported on 5/13/2024)      [DISCONTINUED] dicyclomine (BENTYL) 20 MG tablet Take 1 tablet by mouth. (Patient not taking: Reported on 4/15/2024)      [DISCONTINUED] exenatide er (Bydureon BCise) 2 MG/0.85ML auto-injector injection INJECT 2 MG INTO THE SKIN ONCE A WEEK. (Patient not taking: Reported on 5/13/2024)      [DISCONTINUED] Kelnor 1/35 1-35 MG-MCG per tablet Take 1 tablet by mouth Daily. (Patient not taking: Reported on 5/13/2024) 84 tablet 3    [DISCONTINUED] metFORMIN (GLUCOPHAGE) 850 MG tablet  "Take 1 tablet by mouth Daily With Breakfast. (Patient not taking: Reported on 2024)      [DISCONTINUED] nateglinide (STARLIX) 120 MG tablet Take  by mouth. (Patient not taking: Reported on 2024)      [DISCONTINUED] pioglitazone (ACTOS) 45 MG tablet Take 1 tablet by mouth Daily. (Patient not taking: Reported on 2024)       No current facility-administered medications on file prior to visit.        Past obstetric, gynecological, medical, surgical, family and social history reviewed.  Relevant lab work and imaging reviewed.    Review of systems  Constitutional:  denies fever, chills, malaise.   ENT/Mouth:  denies sore throat, tinnitus  Eyes: denies vision changes/pain  CV:  denies chest pain  Respiratory:  denies cough/SOB  GI:  denies N/V, diarrhea, abdominal pain.    :   denies dysuria  Skin:  denies lesions or pruritus   Neuro:  denies weakness, focal neurologic symptoms    Vitals:    24 1057   BP: 132/60   BP Location: Right arm   Patient Position: Sitting   Pulse: 109   Temp: 100 °F (37.8 °C)   TempSrc: Temporal   Weight: 128 kg (283 lb)   Height: 172.7 cm (68\")     PHYSICAL EXAM   GENERAL: Not in acute distress, AAOx3, pleasant  CARDIO: regular rate and rhythm  PULM: symmetric chest rise, speaking in complete sentences without difficulty  NEURO: awake, alert and oriented to person, place, and time  ABDOMINAL: No fundal tenderness, no rebound or guarding, gravid  EXTREMITIES: no bilateral lower extremity edema/tenderness  SKIN: Warm, well-perfused      ULTRASOUND   Please view full ultrasound note on Imaging tab in ViewPoint.  Breech presentation.  Anterior placenta.  MVP 3.5 cm, which is normal.  Size consistent with dates.   bpm, which is normal.  Grossly normal appearing early anatomy.    ASSESSMENT/COUNSELIN y.o. G  P  at   /7 weeks gestation (Estimated Date of Delivery: 24)    -Pregnancy  [ X ] stable  [   ] improving [  ] worsening    Diagnoses and all orders for " this visit:    1. Advanced maternal age, primigravida, antepartum (Primary)    2. Diabetes mellitus complicating pregnancy, unspecified trimester    3. Hypertension affecting pregnancy, antepartum       ADVANCED MATERNAL AGE  The patient's age related risk for aneuploidy was reviewed. Noninvasive prenatal screening with cell-free fetal DNA (NIPT) results reviewed, which were normal.    Advanced maternal age has been associated with placental insufficiency with increased risk of fetal growth disorder and hypertensive disorders. Recommend fetal growth surveillance. Start  fetal surveillance with weekly BPP at 32-36 weeks.  Recommend baby ASA, which she is taking.        CHRONIC HYPERTENSION  Amlodipine 10 mg   We discussed the risks of chronic hypertension including intrauterine growth restriction, increased risk of preeclampsia, increased risk of premature delivery, and increased risk for placental abruption.  I reassured her that with mild hypertension, no underlying cardiac disease, and normal renal function, most women do well in pregnancy.    I explained that acceptable blood pressures in pregnancies with chronic hypertension and without renal damage are 120-140/70-90s. Newer data from the [facility] (2022, CHAP TRIAL), supports more aggressive bp treatment to below 140/90 (previously, 160 in CHTN) and that this does not impair fetal growth or well-being but reduces risks of pre-eclampsia,  birth, and other pregnancy morbidity.  I discussed this with the patient.      I recommended serial growth ultrasounds every 4 weeks  to ensure appropriate fetal growth. In addition,  fetal testing 1-2x weekly should be initiated around 32 weeks gestation.  I would recommend a 38 week delivery given CHTN that has previously required medication.  She may require closer to 37 depending on her control closer to delivery.        PREGESTATIONAL DM, TYPE 2  Counseling: I explained to her that patients with  pregestational diabetes have a variety of pregnancy related risks that are related to their level of glycemic control.  She reports she has not had a recent optho appt and had an EKG 2023.    We discussed at length the increased risks related to diabetes including but not limited to congenital anomalies, fetal growth disorders (FGR or LGA), indicated or spontaneous  birth, macrosomia, polyhydramnios, birth trauma,  complications (jaundice, hypoglycemia, NICU admit) and stillbirth with uncontrolled glucose.   We discussed all of this increased linearly with increasing blood sugars and A1c.  Her A1c is 6.5 so she is at very slight increased risk for fetal cardiac and spine defects which we discussed.  I reassured her that the most likely scenario is still a normal pregnancy.    We did not review blood sugar logs because she did not bring them to her appointment.    CURRENT REGIMEN  Metformin 1000 mg PO BID with meals  Glyburide 2.5 mg QHS     We discussed diet recommendations, and goal pre-prandial values below 90 and 1-hour post-prandial values below 120 to optimize her pregnancy outcome as many studies have demonstrated that this is the normal range for pregnant women without diabetes.I have calculated her weight based insulin dosing to add to her Metformin 1000 BID.  Her fastings [id number], preprandials  and postprandials 140-150's.   We discussed meal timing and eating every 2-3 hours with 30g carbs at breakfast, 45 for lunch and dinner, and 10-15g snacks in between and at bedtime.  We also discussed insulin teaching and hypoglycemia management and to call with BG <60 or >200.      We also discussed preeclampsia risk reduction with baby aspirin, which she is taking daily.    Recommendations:  -ASA 81 mg--continue daily  -Needs ordered 24 hour urine, CMP, CBC, Uric Acid, LDH for baseline pre-e labs at next OB visit  -EKG ordered today to be done at pts earliest convenience  -Opthalmology  evaluation is recommended this pregnancy (scheduled for Friday)  -Continue ADA diet with carbohydrate counting.    -Continue glucose monitoring 6-7 times daily as instructed.  -Continue medications as directed  -Level II anatomy with M office @ 18-20 weeks  -Fetal echocardiogram @ 22-24 weeks  -Ultrasound for fetal growth every 4 weeks  -Starting at 28 weeks: Fetal movement instructions given continue daily until delivery; instructed to report to labor and delivery if cannot achieve more than 10 kicks in one hour or if she perceives a decrease in fetal movement  - testing at 32 weeks gestation, twice weekly between M and primary OB  -Continue routine prenatal care with primary ob team   -At present goal for delivery is 39 weeks     Follow-up:  4 weeks for growth    Thank you for the consult and opportunity to care for this patient.  Please feel free to reach out with any questions or concerns.      I spent 45 minutes caring for this patient on this date of service. This time includes time spent by me in the following activities: preparing for the visit, reviewing tests, obtaining and/or reviewing a separately obtained history, performing a medically appropriate examination and/or evaluation, counseling and educating the patient/family/caregiver and independently interpreting results and communicating that information with the patient/family/caregiver with greater than 50% spent in counseling and coordination of care.     CELINE Lew  Maternal Fetal Medicine-[facility]  Office: [phone]  [email or web address]

## 2024-05-20 NOTE — LETTER
May 20, 2024     Darius Uribe MD  [address]  [facility] [address]  [place] [id number]    Patient: Lavern Rehman   YOB: 1985   Date of Visit: 5/20/2024       Dear Darius Uribe MD    Lavern Rehman was in my office today. Below is a copy of my note.    If you have questions, please do not hesitate to call me. I look forward to following Lavern along with you.         Sincerely,        CELINE Guzmán        CC: No Recipients                            MATERNAL FETAL MEDICINE Consult Note    Dear Dr Darius Uribe MD:    Thank you for your kind referral of Lavern Rehman.  As you know, she is a 39 y.o. G 2 P 0010 at 12  4/7 weeks gestation (Estimated Date of Delivery: 11/28/24). This is a consult.     Her antepartum course is complicated by:  Chronic Hypertension  Type 2 DM  AMA    Aneuploidy Screening: NIPT - Negative    HPI: Today, she denies headache, blurry vision, RUQ pain. No vaginal bleeding, no contractions.     Review of History:  Past Medical History:   Diagnosis Date   • Adhesive capsulitis of left shoulder 09/08/2022    Formatting of this note might be different from the original.   Added automatically from request for surgery [id number]     • Asthma 02/01/1991   • Congenital cystic kidney disease 05/20/2024   • Diabetes    • TERE (generalized anxiety disorder)    • GERD (gastroesophageal reflux disease)    • HTN (hypertension)    • Hypertension      Past Surgical History:   Procedure Laterality Date   • DILATATION AND CURETTAGE     • KNEE SURGERY     • LAPAROSCOPIC CHOLECYSTECTOMY     • SHOULDER SURGERY       Social History     Socioeconomic History   • Marital status:    Tobacco Use   • Smoking status: Never   • Smokeless tobacco: Never   Vaping Use   • Vaping status: Former   Substance and Sexual Activity   • Alcohol use: Not Currently   • Drug use: Not Currently   • Sexual activity: Yes     Partners: Male     Birth control/protection: Birth control pill      Family History   Problem Relation Age of Onset   • Hypertension Father    • Breast cancer Maternal Grandmother    • Hypertension Maternal Grandfather    • Ovarian cancer Neg Hx    • Uterine cancer Neg Hx    • Colon cancer Neg Hx       Allergies   Allergen Reactions   • Shellfish-Derived Products Hives     N/v.   • Shellfish Allergy Hives   • Oxycodone-Acetaminophen Hives, Itching and Rash      Current Outpatient Medications on File Prior to Visit   Medication Sig Dispense Refill   • amLODIPine (NORVASC) 10 MG tablet Take 1 tablet by mouth Daily.     • aspirin 81 MG chewable tablet Chew 1 tablet Daily. 60 tablet 3   • budesonide-formoterol (SYMBICORT) 80-4.5 MCG/ACT inhaler Inhale 2 puffs 2 (Two) Times a Day.     • ferrous sulfate 325 (65 FE) MG tablet Take 1 tablet by mouth Daily.     • glucose blood test strip Use to check blood sugar 7 times a day. Patient has a One Touch Ultra 2 glucometer. 200 each 10   • glyburide (DIAbeta) 2.5 MG tablet Take 1 tablet by mouth Every Night for 270 days. 30 tablet 8   • ketoconazole (NIZORAL) 2 % cream prn     • metFORMIN ER (GLUCOPHAGE-XR) 500 MG 24 hr tablet Take 2 tablets by mouth Daily With Breakfast & Dinner. 120 tablet 8   • OneTouch Delica Lancets 30G misc 1 Lancet by Other route Take As Directed. Use to check blood sugar 7 times a day. 200 each 10   • pantoprazole (PROTONIX) 20 MG EC tablet Take 1 tablet by mouth Daily.     • prenatal vitamins (PRENATAL 27-1) 27-1 MG tablet tablet Take  by mouth Daily.     • PROVENTIL  (90 BASE) MCG/ACT inhaler INHALE 1 PUFF EVERY 4 HOURS AS NEEDED. 6.7 inhaler 0   • [DISCONTINUED] Adderall 30 MG tablet Take 1 tablet by mouth 2 (Two) Times a Day. (Patient not taking: Reported on 5/13/2024)     • [DISCONTINUED] amLODIPine-benazepril (LOTREL) 10-40 MG per capsule Take 1 capsule by mouth Daily. (Patient not taking: Reported on 5/13/2024)     • [DISCONTINUED] buPROPion XL (WELLBUTRIN XL) 150 MG 24 hr tablet Take 1 tablet by mouth  "Daily. (Patient not taking: Reported on 5/13/2024)     • [DISCONTINUED] dicyclomine (BENTYL) 20 MG tablet Take 1 tablet by mouth. (Patient not taking: Reported on 4/15/2024)     • [DISCONTINUED] exenatide er (Bydureon BCise) 2 MG/0.85ML auto-injector injection INJECT 2 MG INTO THE SKIN ONCE A WEEK. (Patient not taking: Reported on 5/13/2024)     • [DISCONTINUED] Kelnor 1/35 1-35 MG-MCG per tablet Take 1 tablet by mouth Daily. (Patient not taking: Reported on 5/13/2024) 84 tablet 3   • [DISCONTINUED] metFORMIN (GLUCOPHAGE) 850 MG tablet Take 1 tablet by mouth Daily With Breakfast. (Patient not taking: Reported on 5/13/2024)     • [DISCONTINUED] nateglinide (STARLIX) 120 MG tablet Take  by mouth. (Patient not taking: Reported on 5/20/2024)     • [DISCONTINUED] pioglitazone (ACTOS) 45 MG tablet Take 1 tablet by mouth Daily. (Patient not taking: Reported on 5/13/2024)       No current facility-administered medications on file prior to visit.        Past obstetric, gynecological, medical, surgical, family and social history reviewed.  Relevant lab work and imaging reviewed.    Review of systems  Constitutional:  denies fever, chills, malaise.   ENT/Mouth:  denies sore throat, tinnitus  Eyes: denies vision changes/pain  CV:  denies chest pain  Respiratory:  denies cough/SOB  GI:  denies N/V, diarrhea, abdominal pain.    :   denies dysuria  Skin:  denies lesions or pruritus   Neuro:  denies weakness, focal neurologic symptoms    Vitals:    05/20/24 1057   BP: 132/60   BP Location: Right arm   Patient Position: Sitting   Pulse: 109   Temp: 100 °F (37.8 °C)   TempSrc: Temporal   Weight: 128 kg (283 lb)   Height: 172.7 cm (68\")     PHYSICAL EXAM   GENERAL: Not in acute distress, AAOx3, pleasant  CARDIO: regular rate and rhythm  PULM: symmetric chest rise, speaking in complete sentences without difficulty  NEURO: awake, alert and oriented to person, place, and time  ABDOMINAL: No fundal tenderness, no rebound or guarding, " gravid  EXTREMITIES: no bilateral lower extremity edema/tenderness  SKIN: Warm, well-perfused      ULTRASOUND   Please view full ultrasound note on Imaging tab in ViewPoint.  Breech presentation.  Anterior placenta.  MVP 3.5 cm, which is normal.  Size consistent with dates.   bpm, which is normal.  Grossly normal appearing early anatomy.    ASSESSMENT/COUNSELIN y.o. G  P  at   /7 weeks gestation (Estimated Date of Delivery: 24)    -Pregnancy  [ X ] stable  [   ] improving [  ] worsening    Diagnoses and all orders for this visit:    1. Advanced maternal age, primigravida, antepartum (Primary)    2. Diabetes mellitus complicating pregnancy, unspecified trimester    3. Hypertension affecting pregnancy, antepartum       ADVANCED MATERNAL AGE  The patient's age related risk for aneuploidy was reviewed. Noninvasive prenatal screening with cell-free fetal DNA (NIPT) results reviewed, which were normal.    Advanced maternal age has been associated with placental insufficiency with increased risk of fetal growth disorder and hypertensive disorders. Recommend fetal growth surveillance. Start  fetal surveillance with weekly BPP at 32-36 weeks.  Recommend baby ASA, which she is taking.        CHRONIC HYPERTENSION  Amlodipine 10 mg   We discussed the risks of chronic hypertension including intrauterine growth restriction, increased risk of preeclampsia, increased risk of premature delivery, and increased risk for placental abruption.  I reassured her that with mild hypertension, no underlying cardiac disease, and normal renal function, most women do well in pregnancy.    I explained that acceptable blood pressures in pregnancies with chronic hypertension and without renal damage are 120-140/70-90s. Newer data from the [facility] (2022, CHAP TRIAL), supports more aggressive bp treatment to below 140/90 (previously, 160 in CHTN) and that this does not impair fetal growth or well-being but reduces risks  of pre-eclampsia,  birth, and other pregnancy morbidity.  I discussed this with the patient.      I recommended serial growth ultrasounds every 4 weeks  to ensure appropriate fetal growth. In addition,  fetal testing 1-2x weekly should be initiated around 32 weeks gestation.  I would recommend a 38 week delivery given CHTN that has previously required medication.  She may require closer to 37 depending on her control closer to delivery.        PREGESTATIONAL DM, TYPE 2  Counseling: I explained to her that patients with pregestational diabetes have a variety of pregnancy related risks that are related to their level of glycemic control.  She reports she has not had a recent optho appt and had an EKG 2023.    We discussed at length the increased risks related to diabetes including but not limited to congenital anomalies, fetal growth disorders (FGR or LGA), indicated or spontaneous  birth, macrosomia, polyhydramnios, birth trauma,  complications (jaundice, hypoglycemia, NICU admit) and stillbirth with uncontrolled glucose.   We discussed all of this increased linearly with increasing blood sugars and A1c.  Her A1c is 6.5 so she is at very slight increased risk for fetal cardiac and spine defects which we discussed.  I reassured her that the most likely scenario is still a normal pregnancy.    We did not review blood sugar logs because she did not bring them to her appointment.    We discussed diet recommendations, and goal pre-prandial values below 90 and 1-hour post-prandial values below 120 to optimize her pregnancy outcome as many studies have demonstrated that this is the normal range for pregnant women without diabetes.I have calculated her weight based insulin dosing to add to her Metformin 1000 BID.  Her fastings [id number], preprandials  and postprandials 140-150's.   We discussed meal timing and eating every 2-3 hours with 30g carbs at breakfast, 45 for lunch and  dinner, and 10-15g snacks in between and at bedtime.  We also discussed insulin teaching and hypoglycemia management and to call with BG <60 or >200.      We also discussed preeclampsia risk reduction with baby aspirin, which she is taking daily.    Recommendations:  -ASA 81 mg--continue daily  -Needs ordered 24 hour urine, CMP, CBC, Uric Acid, LDH for baseline pre-e labs at next OB visit  -EKG ordered today to be done at pts earliest convenience  -Opthalmology evaluation is recommended this pregnancy (scheduled for Friday)  -Continue ADA diet with carbohydrate counting.    -Continue glucose monitoring 6-7 times daily as instructed.  -Continue medications as directed  -Level II anatomy with MFM office @ 18-20 weeks  -Fetal echocardiogram @ 22-24 weeks  -Ultrasound for fetal growth every 4 weeks  -Starting at 28 weeks: Fetal movement instructions given continue daily until delivery; instructed to report to labor and delivery if cannot achieve more than 10 kicks in one hour or if she perceives a decrease in fetal movement  - testing at 32 weeks gestation, twice weekly between M and primary OB  -Continue routine prenatal care with primary ob team   -At present goal for delivery is 39 weeks     Follow-up:  4 weeks for growth    Thank you for the consult and opportunity to care for this patient.  Please feel free to reach out with any questions or concerns.      I spent 45 minutes caring for this patient on this date of service. This time includes time spent by me in the following activities: preparing for the visit, reviewing tests, obtaining and/or reviewing a separately obtained history, performing a medically appropriate examination and/or evaluation, counseling and educating the patient/family/caregiver and independently interpreting results and communicating that information with the patient/family/caregiver with greater than 50% spent in counseling and coordination of care.     Debra Lyon,  CELINE  Maternal Fetal Medicine-[facility]  Office: [phone]  [email or web address]    Pt reports that she is doing well and denies vaginal bleeding, cramping, contractions or LOF at this time. Reports active fetal movement. Reviewed when to call OB office or present to L&D for evaluation with symptoms such as decreased fetal movement, vaginal bleeding, LOF or ctxs. Pt verbalized understanding. Denies HA, visual changes or epigastric pain. Denies any additional complaints at time of appointment. Next OB appointment scheduled for 06/11/2024    Vitals:    05/20/24 1057   BP: 132/60   Pulse: 109   Temp: 100 °F (37.8 °C)

## 2024-05-20 NOTE — PROGRESS NOTES
Pt reports that she is doing well and denies vaginal bleeding, cramping, contractions or LOF at this time. Reports active fetal movement. Reviewed when to call OB office or present to L&D for evaluation with symptoms such as decreased fetal movement, vaginal bleeding, LOF or ctxs. Pt verbalized understanding. Denies HA, visual changes or epigastric pain. Denies any additional complaints at time of appointment. Next OB appointment scheduled for 06/11/2024    Vitals:    05/20/24 1057   BP: 132/60   Pulse: 109   Temp: 100 °F (37.8 °C)

## 2024-05-22 ENCOUNTER — HOSPITAL ENCOUNTER (OUTPATIENT)
Dept: CARDIOLOGY | Facility: HOSPITAL | Age: 39
Discharge: HOME | End: 2024-05-22
Admitting: NURSE PRACTITIONER
Payer: COMMERCIAL

## 2024-05-22 DIAGNOSIS — O16.9 HYPERTENSION AFFECTING PREGNANCY, ANTEPARTUM: ICD-10-CM

## 2024-05-22 LAB
QT INTERVAL: 353 MS
QTC INTERVAL: 444 MS

## 2024-05-22 PROCEDURE — 93005 ELECTROCARDIOGRAM TRACING: CPT | Performed by: NURSE PRACTITIONER

## 2024-05-24 ENCOUNTER — DOCUMENTATION (OUTPATIENT)
Dept: OBSTETRICS AND GYNECOLOGY | Facility: CLINIC | Age: 39
End: 2024-05-24
Payer: COMMERCIAL

## 2024-05-24 NOTE — PROGRESS NOTES
Weekly MFM glucose log evaluation. No adjustments needed at this time. Pt to continue the following regimen:    Metformin: 1000mg BID  Glyburide: 2.5mg QHS    MFM will review glucose logs again on 5/31.

## 2024-05-31 ENCOUNTER — DOCUMENTATION (OUTPATIENT)
Dept: OBSTETRICS AND GYNECOLOGY | Facility: CLINIC | Age: 39
End: 2024-05-31
Payer: COMMERCIAL

## 2024-05-31 NOTE — PROGRESS NOTES
Weekly MFM glucose log evaluation. No adjustments made at this time. Pt to continue the following regimen:    Metformin: 1000mg BID  Glyburide 2.5mg HS    Lavern reports that work has been very stressful and asked about resuming Hydroxyzine in pregnancy. Per Dr. Curry patient can resume taking Hydroxyzine for anxiety in pregnancy. MFM will review glucose logs again on 6/7.

## 2024-06-03 ENCOUNTER — TRANSCRIBE ORDERS (OUTPATIENT)
Dept: ULTRASOUND IMAGING | Facility: HOSPITAL | Age: 39
End: 2024-06-03
Payer: COMMERCIAL

## 2024-06-03 DIAGNOSIS — O09.529 AMA (ADVANCED MATERNAL AGE) MULTIGRAVIDA 35+, UNSPECIFIED TRIMESTER: Primary | ICD-10-CM

## 2024-06-03 DIAGNOSIS — O24.919 DIABETES MELLITUS AFFECTING PREGNANCY, UNSPECIFIED TRIMESTER: ICD-10-CM

## 2024-06-03 DIAGNOSIS — O16.9 HYPERTENSION AFFECTING PREGNANCY, ANTEPARTUM: ICD-10-CM

## 2024-06-07 ENCOUNTER — DOCUMENTATION (OUTPATIENT)
Dept: OBSTETRICS AND GYNECOLOGY | Facility: CLINIC | Age: 39
End: 2024-06-07
Payer: COMMERCIAL

## 2024-06-07 RX ORDER — GLYBURIDE 2.5 MG/1
2.5 TABLET ORAL TAKE AS DIRECTED
Qty: 60 TABLET | Refills: 8 | Status: SHIPPED | OUTPATIENT
Start: 2024-06-07

## 2024-06-07 NOTE — PROGRESS NOTES
Weekly MFM glucose log evaluation. The following adjustments were made:    Metformin: 1000mg BID  Glyburide: start 1.25mg with breakfast and continue 2.5mg at bedtime    MFM will review glucose logs again on 6/14.

## 2024-06-11 ENCOUNTER — ROUTINE PRENATAL (OUTPATIENT)
Dept: OBSTETRICS AND GYNECOLOGY | Facility: CLINIC | Age: 39
End: 2024-06-11
Payer: COMMERCIAL

## 2024-06-11 VITALS — SYSTOLIC BLOOD PRESSURE: 136 MMHG | WEIGHT: 287.4 LBS | DIASTOLIC BLOOD PRESSURE: 82 MMHG | BODY MASS INDEX: 43.7 KG/M2

## 2024-06-11 DIAGNOSIS — O16.9 HYPERTENSION AFFECTING PREGNANCY, ANTEPARTUM: ICD-10-CM

## 2024-06-11 DIAGNOSIS — O24.919 DIABETES MELLITUS COMPLICATING PREGNANCY, UNSPECIFIED TRIMESTER: ICD-10-CM

## 2024-06-11 DIAGNOSIS — O09.519 ADVANCED MATERNAL AGE, PRIMIGRAVIDA, ANTEPARTUM: ICD-10-CM

## 2024-06-11 DIAGNOSIS — Z3A.15 15 WEEKS GESTATION OF PREGNANCY: Primary | ICD-10-CM

## 2024-06-11 DIAGNOSIS — Z34.82 PRENATAL CARE, SUBSEQUENT PREGNANCY IN SECOND TRIMESTER: ICD-10-CM

## 2024-06-11 DIAGNOSIS — O99.210 OBESITY IN PREGNANCY, ANTEPARTUM: ICD-10-CM

## 2024-06-11 PROCEDURE — 0502F SUBSEQUENT PRENATAL CARE: CPT | Performed by: OBSTETRICS & GYNECOLOGY

## 2024-06-11 NOTE — PROGRESS NOTES
OB follow up     Lavern Rehman is a 39 y.o.  15w5d being seen today for her obstetrical visit.  Patient reports no complaints. Fetal movement: normal.    Follow-up ultrasound shows near resolution of the right ovarian cyst.  Is down to 2 x 3 cm and does not need to be followed any longer.    Her prenatal care is complicated by (and status): Advanced maternal age, chronic hypertension, type 2 diabetes, obesity in pregnancy.    Review of Systems  Cramping/contractions : Negative  Vaginal bleeding: Negative  Fetal movement normal    /82   Wt 130 kg (287 lb 6.4 oz)   LMP 2024 (Exact Date)   BMI 43.70 kg/m²     FHT: 153 BPM   Uterine Size: size equals dates       Assessment    Diagnoses and all orders for this visit:    1. 15 weeks gestation of pregnancy (Primary)    2. Prenatal care, subsequent pregnancy in second trimester    3. Diabetes mellitus complicating pregnancy, unspecified trimester    4. Hypertension affecting pregnancy, antepartum    5. Advanced maternal age, primigravida, antepartum    6. Obesity in pregnancy, antepartum        1) pregnancy at 15w5d         Plan    Reviewed this stage of pregnancy  Problem list updated   Follow up in about 5 weeks.  She will be seeing maternal-fetal medicine for her growth ultrasound going forward.    Darius Uribe MD   2024  15:48 EDT

## 2024-06-14 ENCOUNTER — DOCUMENTATION (OUTPATIENT)
Dept: OBSTETRICS AND GYNECOLOGY | Facility: CLINIC | Age: 39
End: 2024-06-14
Payer: COMMERCIAL

## 2024-06-14 NOTE — PROGRESS NOTES
Weekly MFM glucose log evaluation. No adjustments needed this week. Patient encouraged to continue the following regimen:    Metformin: 1000mg BID  Glyburide: 1.25mg with breakfast and 2.5mg at bedtime    MFM will review glucose logs again on 6/19.

## 2024-06-19 ENCOUNTER — HOSPITAL ENCOUNTER (OUTPATIENT)
Dept: ULTRASOUND IMAGING | Facility: HOSPITAL | Age: 39
Discharge: HOME | End: 2024-06-19
Admitting: OBSTETRICS & GYNECOLOGY
Payer: COMMERCIAL

## 2024-06-19 ENCOUNTER — TELEPHONE (OUTPATIENT)
Dept: OBSTETRICS AND GYNECOLOGY | Facility: CLINIC | Age: 39
End: 2024-06-19
Payer: COMMERCIAL

## 2024-06-19 ENCOUNTER — OFFICE VISIT (OUTPATIENT)
Dept: OBSTETRICS AND GYNECOLOGY | Facility: CLINIC | Age: 39
End: 2024-06-19
Payer: COMMERCIAL

## 2024-06-19 VITALS
WEIGHT: 290 LBS | DIASTOLIC BLOOD PRESSURE: 63 MMHG | TEMPERATURE: 99.1 F | SYSTOLIC BLOOD PRESSURE: 134 MMHG | BODY MASS INDEX: 43.95 KG/M2 | HEIGHT: 68 IN | HEART RATE: 105 BPM

## 2024-06-19 DIAGNOSIS — O24.919 DIABETES MELLITUS COMPLICATING PREGNANCY, UNSPECIFIED TRIMESTER: ICD-10-CM

## 2024-06-19 DIAGNOSIS — O16.9 HYPERTENSION AFFECTING PREGNANCY, ANTEPARTUM: Primary | ICD-10-CM

## 2024-06-19 DIAGNOSIS — O09.519 ADVANCED MATERNAL AGE, PRIMIGRAVIDA, ANTEPARTUM: ICD-10-CM

## 2024-06-19 DIAGNOSIS — O24.919 DIABETES MELLITUS AFFECTING PREGNANCY, UNSPECIFIED TRIMESTER: ICD-10-CM

## 2024-06-19 DIAGNOSIS — O09.529 AMA (ADVANCED MATERNAL AGE) MULTIGRAVIDA 35+, UNSPECIFIED TRIMESTER: ICD-10-CM

## 2024-06-19 DIAGNOSIS — O16.9 HYPERTENSION AFFECTING PREGNANCY, ANTEPARTUM: ICD-10-CM

## 2024-06-19 LAB — GLUCOSE BLDC GLUCOMTR-MCNC: 90 MG/DL (ref 70–130)

## 2024-06-19 PROCEDURE — 76805 OB US >/= 14 WKS SNGL FETUS: CPT

## 2024-06-19 PROCEDURE — 82948 REAGENT STRIP/BLOOD GLUCOSE: CPT

## 2024-06-19 NOTE — PROGRESS NOTES
MATERNAL FETAL MEDICINE Consult Note    Dear Dr Darius Uribe MD:    Thank you for your kind referral of Lavern Rehman.  As you know, she is a 39 y.o. G 2 P 0010 at 16  6/7 weeks gestation (Estimated Date of Delivery: 11/28/24). This is a consult.     Her antepartum course is complicated by:  Chronic Hypertension  Type 2 DM  AMA    Aneuploidy Screening: NIPT - Negative    HPI: Today, she denies headache, blurry vision, RUQ pain. No vaginal bleeding, no contractions.     Review of History:  Past Medical History:   Diagnosis Date    Adhesive capsulitis of left shoulder 09/08/2022    Formatting of this note might be different from the original.   Added automatically from request for surgery [id number]      Asthma 02/01/1991    Congenital cystic kidney disease 05/20/2024    Diabetes     TERE (generalized anxiety disorder)     GERD (gastroesophageal reflux disease)     HTN (hypertension)     Hypertension      Past Surgical History:   Procedure Laterality Date    DILATATION AND CURETTAGE      KNEE SURGERY      LAPAROSCOPIC CHOLECYSTECTOMY      SHOULDER SURGERY       Social History     Socioeconomic History    Marital status:    Tobacco Use    Smoking status: Never    Smokeless tobacco: Never   Vaping Use    Vaping status: Former   Substance and Sexual Activity    Alcohol use: Not Currently    Drug use: Not Currently    Sexual activity: Yes     Partners: Male     Birth control/protection: Birth control pill     Family History   Problem Relation Age of Onset    Hypertension Father     Breast cancer Maternal Grandmother     Hypertension Maternal Grandfather     Ovarian cancer Neg Hx     Uterine cancer Neg Hx     Colon cancer Neg Hx       Allergies   Allergen Reactions    Shellfish-Derived Products Hives     N/v.    Shellfish Allergy Hives    Oxycodone-Acetaminophen Hives, Itching and Rash      Current Outpatient Medications on File Prior to Visit   Medication Sig Dispense Refill    amLODIPine (NORVASC) 10 MG tablet  "Take 1 tablet by mouth Daily.      aspirin 81 MG chewable tablet Chew 1 tablet Daily. 60 tablet 3    budesonide-formoterol (SYMBICORT) 80-4.5 MCG/ACT inhaler Inhale 2 puffs 2 (Two) Times a Day.      ferrous sulfate 325 (65 FE) MG tablet Take 1 tablet by mouth Daily.      glucose blood test strip Use to check blood sugar 7 times a day. Patient has a One Touch Ultra 2 glucometer. 200 each 10    glyburide (DIAbeta) 2.5 MG tablet Take 1 tablet by mouth Take As Directed. Take 1.25mg with breakfast and 2.5mg at bedtime 60 tablet 8    ketoconazole (NIZORAL) 2 % cream prn      metFORMIN ER (GLUCOPHAGE-XR) 500 MG 24 hr tablet Take 2 tablets by mouth Daily With Breakfast & Dinner. 120 tablet 8    OneTouch Delica Lancets 30G misc 1 Lancet by Other route Take As Directed. Use to check blood sugar 7 times a day. 200 each 10    pantoprazole (PROTONIX) 20 MG EC tablet Take 1 tablet by mouth Daily.      prenatal vitamins (PRENATAL 27-1) 27-1 MG tablet tablet Take  by mouth Daily.      PROVENTIL  (90 BASE) MCG/ACT inhaler INHALE 1 PUFF EVERY 4 HOURS AS NEEDED. 6.7 inhaler 0     No current facility-administered medications on file prior to visit.        Past obstetric, gynecological, medical, surgical, family and social history reviewed.  Relevant lab work and imaging reviewed.    Review of systems  Constitutional:  denies fever, chills, malaise.   ENT/Mouth:  denies sore throat, tinnitus  Eyes: denies vision changes/pain  CV:  denies chest pain  Respiratory:  denies cough/SOB  GI:  denies N/V, diarrhea, abdominal pain.    :   denies dysuria  Skin:  denies lesions or pruritus   Neuro:  denies weakness, focal neurologic symptoms    Vitals:    06/19/24 1055   BP: 134/63   BP Location: Right arm   Patient Position: Sitting   Pulse: 105   Temp: 99.1 °F (37.3 °C)   TempSrc: Temporal   Weight: 132 kg (290 lb)   Height: 172.7 cm (68\")       PHYSICAL EXAM   GENERAL: Not in acute distress, AAOx3, pleasant  CARDIO: regular rate and " rhythm  PULM: symmetric chest rise, speaking in complete sentences without difficulty  NEURO: awake, alert and oriented to person, place, and time  ABDOMINAL: No fundal tenderness, no rebound or guarding, gravid  EXTREMITIES: no bilateral lower extremity edema/tenderness  SKIN: Warm, well-perfused      ULTRASOUND   Please view full ultrasound note on Imaging tab in [employer].  Cephalic presentation.  Anterior placenta.  MVP 4.1 cm, which is normal.   g (AC 84%)  Intermediate anatomy visualized appears normal.    ASSESSMENT/COUNSELIN y.o.   at 16  6/7 weeks gestation (Estimated Date of Delivery: 24)    -Pregnancy  [ X ] stable  [   ] improving [  ] worsening    Diagnoses and all orders for this visit:    1. Hypertension affecting pregnancy, antepartum (Primary)  -     CBC & Differential  -     Comprehensive Metabolic Panel  -     Uric Acid  -     Lactate Dehydrogenase  -     Protein, Urine, 24 Hour - Urine, Clean Catch  -     Protein / Creatinine Ratio, Urine - Urine, Clean Catch    2. Diabetes mellitus complicating pregnancy, unspecified trimester  -     [facility]; Future    3. Advanced maternal age, primigravida, antepartum         ADVANCED MATERNAL AGE  The patient's age related risk for aneuploidy was reviewed. Noninvasive prenatal screening with cell-free fetal DNA (NIPT) results reviewed, which were normal.    Advanced maternal age has been associated with placental insufficiency with increased risk of fetal growth disorder and hypertensive disorders. Recommend fetal growth surveillance. Start  fetal surveillance with weekly BPP at 32-36 weeks.  Recommend baby ASA, which she is taking.        CHRONIC HYPERTENSION  Amlodipine 10 mg   We discussed the risks of chronic hypertension including intrauterine growth restriction, increased risk of preeclampsia, increased risk of premature delivery, and increased risk for placental abruption.  I reassured her  that with mild hypertension, no underlying cardiac disease, and normal renal function, most women do well in pregnancy.    I explained that acceptable blood pressures in pregnancies with chronic hypertension and without renal damage are 120-140/70-90s. Newer data from the [facility] (2022, CHAP TRIAL), supports more aggressive bp treatment to below 140/90 (previously, 160 in CHTN) and that this does not impair fetal growth or well-being but reduces risks of pre-eclampsia,  birth, and other pregnancy morbidity.  I discussed this with the patient.      I recommended serial growth ultrasounds every 4 weeks  to ensure appropriate fetal growth. In addition,  fetal testing 1-2x weekly should be initiated around 32 weeks gestation.  I would recommend a 38 week delivery given CHTN that has previously required medication.  She may require closer to 37 depending on her control closer to delivery.        PREGESTATIONAL DM, TYPE 2  Counseling: I explained to her that patients with pregestational diabetes have a variety of pregnancy related risks that are related to their level of glycemic control.  She reports she has not had a recent optho appt and had an EKG 2023.    We discussed at length the increased risks related to diabetes including but not limited to congenital anomalies, fetal growth disorders (FGR or LGA), indicated or spontaneous  birth, macrosomia, polyhydramnios, birth trauma,  complications (jaundice, hypoglycemia, NICU admit) and stillbirth with uncontrolled glucose.   We discussed all of this increased linearly with increasing blood sugars and A1c.  Her A1c is 6.5 so she is at very slight increased risk for fetal cardiac and spine defects which we discussed.  I reassured her that the most likely scenario is still a normal pregnancy.    We did not review blood sugar logs because she did not bring them to her appointment. Counseled on importance of bringing logs to every  appointment.     CURRENT REGIMEN  Metformin 1000 mg PO BID with meals  Glyburide 2.5 mg QHS     We discussed diet recommendations, and goal pre-prandial values below 90 and 1-hour post-prandial values below 120 to optimize her pregnancy outcome as many studies have demonstrated that this is the normal range for pregnant women without diabetes.I have calculated her weight based insulin dosing to add to her Metformin 1000 BID.  Her fastings [id number], preprandials  and postprandials 140-150's.   We discussed meal timing and eating every 2-3 hours with 30g carbs at breakfast, 45 for lunch and dinner, and 10-15g snacks in between and at bedtime.  We also discussed insulin teaching and hypoglycemia management and to call with BG <60 or >200.      We also discussed preeclampsia risk reduction with baby aspirin, which she is taking daily.    Recommendations:  -ASA 81 mg--continue daily  -Needs ordered 24 hour urine, CMP, CBC, Uric Acid, LDH for baseline pre-e labs (ORDERED 24)  -EKG completed   -Opthalmology evaluation is recommended this pregnancy (scheduled for Friday)  -Continue ADA diet with carbohydrate counting.    -Continue glucose monitoring 6-7 times daily as instructed.  -Continue medications as directed  -Level II anatomy with MFM office @ 18-20 weeks  -Fetal echocardiogram @ 22-24 weeks (ordered)  -Ultrasound for fetal growth every 4 weeks  -Starting at 28 weeks: Fetal movement instructions given continue daily until delivery; instructed to report to labor and delivery if cannot achieve more than 10 kicks in one hour or if she perceives a decrease in fetal movement  - testing at 32 weeks gestation, twice weekly between M and primary OB  -Continue routine prenatal care with primary ob team   -At present goal for delivery is 39 weeks     Follow-up:  4 weeks for growth    Thank you for the consult and opportunity to care for this patient.  Please feel free to reach out with any questions or  concerns.      I spent 20 minutes caring for this patient on this date of service. This time includes time spent by me in the following activities: preparing for the visit, reviewing tests, obtaining and/or reviewing a separately obtained history, performing a medically appropriate examination and/or evaluation, counseling and educating the patient/family/caregiver and independently interpreting results and communicating that information with the patient/family/caregiver with greater than 50% spent in counseling and coordination of care.     CELINE Lew  Maternal Fetal Medicine-[facility]  Office: [phone]  [email or web address]

## 2024-06-19 NOTE — TELEPHONE ENCOUNTER
Pt would like to verify she may have MFM appt and OB appt with our office the same day. Pt states she was advised by provider this is ok, but also informed she may not have appts on same day.     Can you please confirm if this is ok? Thank you!

## 2024-06-19 NOTE — LETTER
June 19, 2024     Darius Uribe MD  [address]  [place] [id number]    Patient: Lavern Rehman   YOB: 1985   Date of Visit: 6/19/2024       Dear Darius Uribe MD,    Thank you for referring Lavern Rehman to me for evaluation. Below is a copy of my consult note.    If you have questions, please do not hesitate to call me. I look forward to following Lavern along with you.         Sincerely,        CELINE Guzmán        CC: No Recipients                        MATERNAL FETAL MEDICINE Consult Note    Dear Dr Darius Uribe MD:    Thank you for your kind referral of Lavern Rehman.  As you know, she is a 39 y.o. G 2 P 0010 at 16  6/7 weeks gestation (Estimated Date of Delivery: 11/28/24). This is a consult.     Her antepartum course is complicated by:  Chronic Hypertension  Type 2 DM  AMA    Aneuploidy Screening: NIPT - Negative    HPI: Today, she denies headache, blurry vision, RUQ pain. No vaginal bleeding, no contractions.     Review of History:  Past Medical History:   Diagnosis Date   • Adhesive capsulitis of left shoulder 09/08/2022    Formatting of this note might be different from the original.   Added automatically from request for surgery [id number]     • Asthma 02/01/1991   • Congenital cystic kidney disease 05/20/2024   • Diabetes    • TERE (generalized anxiety disorder)    • GERD (gastroesophageal reflux disease)    • HTN (hypertension)    • Hypertension      Past Surgical History:   Procedure Laterality Date   • DILATATION AND CURETTAGE     • KNEE SURGERY     • LAPAROSCOPIC CHOLECYSTECTOMY     • SHOULDER SURGERY       Social History     Socioeconomic History   • Marital status:    Tobacco Use   • Smoking status: Never   • Smokeless tobacco: Never   Vaping Use   • Vaping status: Former   Substance and Sexual Activity   • Alcohol use: Not Currently   • Drug use: Not Currently   • Sexual activity: Yes     Partners: Male     Birth control/protection:  Birth control pill     Family History   Problem Relation Age of Onset   • Hypertension Father    • Breast cancer Maternal Grandmother    • Hypertension Maternal Grandfather    • Ovarian cancer Neg Hx    • Uterine cancer Neg Hx    • Colon cancer Neg Hx       Allergies   Allergen Reactions   • Shellfish-Derived Products Hives     N/v.   • Shellfish Allergy Hives   • Oxycodone-Acetaminophen Hives, Itching and Rash      Current Outpatient Medications on File Prior to Visit   Medication Sig Dispense Refill   • amLODIPine (NORVASC) 10 MG tablet Take 1 tablet by mouth Daily.     • aspirin 81 MG chewable tablet Chew 1 tablet Daily. 60 tablet 3   • budesonide-formoterol (SYMBICORT) 80-4.5 MCG/ACT inhaler Inhale 2 puffs 2 (Two) Times a Day.     • ferrous sulfate 325 (65 FE) MG tablet Take 1 tablet by mouth Daily.     • glucose blood test strip Use to check blood sugar 7 times a day. Patient has a One Touch Ultra 2 glucometer. 200 each 10   • glyburide (DIAbeta) 2.5 MG tablet Take 1 tablet by mouth Take As Directed. Take 1.25mg with breakfast and 2.5mg at bedtime 60 tablet 8   • ketoconazole (NIZORAL) 2 % cream prn     • metFORMIN ER (GLUCOPHAGE-XR) 500 MG 24 hr tablet Take 2 tablets by mouth Daily With Breakfast & Dinner. 120 tablet 8   • OneTouch Delica Lancets 30G misc 1 Lancet by Other route Take As Directed. Use to check blood sugar 7 times a day. 200 each 10   • pantoprazole (PROTONIX) 20 MG EC tablet Take 1 tablet by mouth Daily.     • prenatal vitamins (PRENATAL 27-1) 27-1 MG tablet tablet Take  by mouth Daily.     • PROVENTIL  (90 BASE) MCG/ACT inhaler INHALE 1 PUFF EVERY 4 HOURS AS NEEDED. 6.7 inhaler 0     No current facility-administered medications on file prior to visit.        Past obstetric, gynecological, medical, surgical, family and social history reviewed.  Relevant lab work and imaging reviewed.    Review of systems  Constitutional:  denies fever, chills, malaise.   ENT/Mouth:  denies sore throat,  "tinnitus  Eyes: denies vision changes/pain  CV:  denies chest pain  Respiratory:  denies cough/SOB  GI:  denies N/V, diarrhea, abdominal pain.    :   denies dysuria  Skin:  denies lesions or pruritus   Neuro:  denies weakness, focal neurologic symptoms    Vitals:    24 1055   BP: 134/63   BP Location: Right arm   Patient Position: Sitting   Pulse: 105   Temp: 99.1 °F (37.3 °C)   TempSrc: Temporal   Weight: 132 kg (290 lb)   Height: 172.7 cm (68\")       PHYSICAL EXAM   GENERAL: Not in acute distress, AAOx3, pleasant  CARDIO: regular rate and rhythm  PULM: symmetric chest rise, speaking in complete sentences without difficulty  NEURO: awake, alert and oriented to person, place, and time  ABDOMINAL: No fundal tenderness, no rebound or guarding, gravid  EXTREMITIES: no bilateral lower extremity edema/tenderness  SKIN: Warm, well-perfused      ULTRASOUND   Please view full ultrasound note on Imaging tab in ViewPoint.  Cephalic presentation.  Anterior placenta.  MVP 4.1 cm, which is normal.   g (AC 84%)  Intermediate anatomy visualized appears normal.    ASSESSMENT/COUNSELIN y.o.   at 16  6/7 weeks gestation (Estimated Date of Delivery: 24)    -Pregnancy  [ X ] stable  [   ] improving [  ] worsening    Diagnoses and all orders for this visit:    1. Hypertension affecting pregnancy, antepartum (Primary)  -     CBC & Differential  -     Comprehensive Metabolic Panel  -     Uric Acid  -     Lactate Dehydrogenase  -     Protein, Urine, 24 Hour - Urine, Clean Catch  -     Protein / Creatinine Ratio, Urine - Urine, Clean Catch    2. Diabetes mellitus complicating pregnancy, unspecified trimester  -     [facility]; Future    3. Advanced maternal age, primigravida, antepartum         ADVANCED MATERNAL AGE  The patient's age related risk for aneuploidy was reviewed. Noninvasive prenatal screening with cell-free fetal DNA (NIPT) results reviewed, which were normal.  "   Advanced maternal age has been associated with placental insufficiency with increased risk of fetal growth disorder and hypertensive disorders. Recommend fetal growth surveillance. Start  fetal surveillance with weekly BPP at 32-36 weeks.  Recommend baby ASA, which she is taking.        CHRONIC HYPERTENSION  Amlodipine 10 mg   We discussed the risks of chronic hypertension including intrauterine growth restriction, increased risk of preeclampsia, increased risk of premature delivery, and increased risk for placental abruption.  I reassured her that with mild hypertension, no underlying cardiac disease, and normal renal function, most women do well in pregnancy.    I explained that acceptable blood pressures in pregnancies with chronic hypertension and without renal damage are 120-140/70-90s. Newer data from the [facility] (2022, CHAP TRIAL), supports more aggressive bp treatment to below 140/90 (previously, 160 in CHTN) and that this does not impair fetal growth or well-being but reduces risks of pre-eclampsia,  birth, and other pregnancy morbidity.  I discussed this with the patient.      I recommended serial growth ultrasounds every 4 weeks  to ensure appropriate fetal growth. In addition,  fetal testing 1-2x weekly should be initiated around 32 weeks gestation.  I would recommend a 38 week delivery given CHTN that has previously required medication.  She may require closer to 37 depending on her control closer to delivery.        PREGESTATIONAL DM, TYPE 2  Counseling: I explained to her that patients with pregestational diabetes have a variety of pregnancy related risks that are related to their level of glycemic control.  She reports she has not had a recent optho appt and had an EKG 2023.    We discussed at length the increased risks related to diabetes including but not limited to congenital anomalies, fetal growth disorders (FGR or LGA), indicated or spontaneous  birth,  macrosomia, polyhydramnios, birth trauma,  complications (jaundice, hypoglycemia, NICU admit) and stillbirth with uncontrolled glucose.   We discussed all of this increased linearly with increasing blood sugars and A1c.  Her A1c is 6.5 so she is at very slight increased risk for fetal cardiac and spine defects which we discussed.  I reassured her that the most likely scenario is still a normal pregnancy.    We did not review blood sugar logs because she did not bring them to her appointment. Counseled on importance of bringing logs to every appointment.     CURRENT REGIMEN  Metformin 1000 mg PO BID with meals  Glyburide 2.5 mg QHS     We discussed diet recommendations, and goal pre-prandial values below 90 and 1-hour post-prandial values below 120 to optimize her pregnancy outcome as many studies have demonstrated that this is the normal range for pregnant women without diabetes.I have calculated her weight based insulin dosing to add to her Metformin 1000 BID.  Her fastings [id number], preprandials  and postprandials 140-150's.   We discussed meal timing and eating every 2-3 hours with 30g carbs at breakfast, 45 for lunch and dinner, and 10-15g snacks in between and at bedtime.  We also discussed insulin teaching and hypoglycemia management and to call with BG <60 or >200.      We also discussed preeclampsia risk reduction with baby aspirin, which she is taking daily.    Recommendations:  -ASA 81 mg--continue daily  -Needs ordered 24 hour urine, CMP, CBC, Uric Acid, LDH for baseline pre-e labs (ORDERED 24)  -EKG completed   -Opthalmology evaluation is recommended this pregnancy (scheduled for Friday)  -Continue ADA diet with carbohydrate counting.    -Continue glucose monitoring 6-7 times daily as instructed.  -Continue medications as directed  -Level II anatomy with [facility] office @ 18-20 weeks  -Fetal echocardiogram @ 22-24 weeks (ordered)  -Ultrasound for fetal growth every 4 weeks  -Starting at  28 weeks: Fetal movement instructions given continue daily until delivery; instructed to report to labor and delivery if cannot achieve more than 10 kicks in one hour or if she perceives a decrease in fetal movement  - testing at 32 weeks gestation, twice weekly between MFM and primary OB  -Continue routine prenatal care with primary ob team   -At present goal for delivery is 39 weeks     Follow-up:  4 weeks for growth    Thank you for the consult and opportunity to care for this patient.  Please feel free to reach out with any questions or concerns.      I spent 20 minutes caring for this patient on this date of service. This time includes time spent by me in the following activities: preparing for the visit, reviewing tests, obtaining and/or reviewing a separately obtained history, performing a medically appropriate examination and/or evaluation, counseling and educating the patient/family/caregiver and independently interpreting results and communicating that information with the patient/family/caregiver with greater than 50% spent in counseling and coordination of care.     CELINE Lew  Maternal Fetal Medicine-[facility]  Office: [phone]  [email or web address]

## 2024-06-20 ENCOUNTER — PATIENT MESSAGE (OUTPATIENT)
Dept: OBSTETRICS AND GYNECOLOGY | Facility: CLINIC | Age: 39
End: 2024-06-20
Payer: COMMERCIAL

## 2024-06-28 RX ORDER — ACARBOSE 25 MG/1
25 TABLET ORAL
Qty: 30 TABLET | Refills: 6 | Status: SHIPPED | OUTPATIENT
Start: 2024-06-28

## 2024-06-28 NOTE — PROGRESS NOTES
Weekly MFM glucose log evaluation. The following adjustments were made:    Metformin: 1000mg BID  Glyburide: 1.25mg AM, 2.5mg HS    Start Acarbose 25mg with dinner.     MFM will review glucose logs again on 7/5.

## 2024-07-05 ENCOUNTER — LAB (OUTPATIENT)
Dept: LAB | Facility: HOSPITAL | Age: 39
End: 2024-07-05
Payer: COMMERCIAL

## 2024-07-05 ENCOUNTER — DOCUMENTATION (OUTPATIENT)
Dept: OBSTETRICS AND GYNECOLOGY | Facility: CLINIC | Age: 39
End: 2024-07-05
Payer: COMMERCIAL

## 2024-07-05 ENCOUNTER — TRANSCRIBE ORDERS (OUTPATIENT)
Dept: ULTRASOUND IMAGING | Facility: HOSPITAL | Age: 39
End: 2024-07-05
Payer: COMMERCIAL

## 2024-07-05 DIAGNOSIS — O16.9 HYPERTENSION AFFECTING PREGNANCY, ANTEPARTUM: ICD-10-CM

## 2024-07-05 DIAGNOSIS — O09.529 AMA (ADVANCED MATERNAL AGE) MULTIGRAVIDA 35+, UNSPECIFIED TRIMESTER: Primary | ICD-10-CM

## 2024-07-05 DIAGNOSIS — O24.919 DIABETES MELLITUS AFFECTING PREGNANCY, UNSPECIFIED TRIMESTER: ICD-10-CM

## 2024-07-05 LAB
ALBUMIN SERPL-MCNC: 4.2 G/DL (ref 3.5–5.2)
ALBUMIN/GLOB SERPL: 1.4 G/DL
ALP SERPL-CCNC: 61 U/L (ref 39–117)
ALT SERPL W P-5'-P-CCNC: 11 U/L (ref 1–33)
ANION GAP SERPL CALCULATED.3IONS-SCNC: 16 MMOL/L (ref 5–15)
AST SERPL-CCNC: <5 U/L (ref 1–32)
BASOPHILS # BLD AUTO: 0.04 10*3/MM3 (ref 0–0.2)
BASOPHILS NFR BLD AUTO: 0.4 % (ref 0–1.5)
BILIRUB SERPL-MCNC: 0.3 MG/DL (ref 0–1.2)
BUN SERPL-MCNC: 8 MG/DL (ref 6–20)
BUN/CREAT SERPL: 11.8 (ref 7–25)
CALCIUM SPEC-SCNC: 9.5 MG/DL (ref 8.6–10.5)
CHLORIDE SERPL-SCNC: 101 MMOL/L (ref 98–107)
CO2 SERPL-SCNC: 22 MMOL/L (ref 22–29)
CREAT SERPL-MCNC: 0.68 MG/DL (ref 0.57–1)
CREAT UR-MCNC: 30.7 MG/DL
DEPRECATED RDW RBC AUTO: 40.2 FL (ref 37–54)
EGFRCR SERPLBLD CKD-EPI 2021: 114.5 ML/MIN/1.73
EOSINOPHIL # BLD AUTO: 0.2 10*3/MM3 (ref 0–0.4)
EOSINOPHIL NFR BLD AUTO: 1.8 % (ref 0.3–6.2)
ERYTHROCYTE [DISTWIDTH] IN BLOOD BY AUTOMATED COUNT: 12.6 % (ref 12.3–15.4)
GLOBULIN UR ELPH-MCNC: 3 GM/DL
GLUCOSE SERPL-MCNC: 88 MG/DL (ref 65–99)
HCT VFR BLD AUTO: 38 % (ref 34–46.6)
HGB BLD-MCNC: 12.9 G/DL (ref 12–15.9)
IMM GRANULOCYTES # BLD AUTO: 0.17 10*3/MM3 (ref 0–0.05)
IMM GRANULOCYTES NFR BLD AUTO: 1.5 % (ref 0–0.5)
LDH SERPL-CCNC: 148 U/L (ref 135–214)
LYMPHOCYTES # BLD AUTO: 1.94 10*3/MM3 (ref 0.7–3.1)
LYMPHOCYTES NFR BLD AUTO: 17.2 % (ref 19.6–45.3)
MCH RBC QN AUTO: 30.1 PG (ref 26.6–33)
MCHC RBC AUTO-ENTMCNC: 33.9 G/DL (ref 31.5–35.7)
MCV RBC AUTO: 88.6 FL (ref 79–97)
MONOCYTES # BLD AUTO: 0.52 10*3/MM3 (ref 0.1–0.9)
MONOCYTES NFR BLD AUTO: 4.6 % (ref 5–12)
NEUTROPHILS NFR BLD AUTO: 74.5 % (ref 42.7–76)
NEUTROPHILS NFR BLD AUTO: 8.42 10*3/MM3 (ref 1.7–7)
NRBC BLD AUTO-RTO: 0 /100 WBC (ref 0–0.2)
PLATELET # BLD AUTO: 370 10*3/MM3 (ref 140–450)
PMV BLD AUTO: 8.9 FL (ref 6–12)
POTASSIUM SERPL-SCNC: 3.9 MMOL/L (ref 3.5–5.2)
PROT ?TM UR-MCNC: 6.7 MG/DL
PROT SERPL-MCNC: 7.2 G/DL (ref 6–8.5)
PROT/CREAT UR: 218.2 MG/G CREA (ref 0–200)
RBC # BLD AUTO: 4.29 10*6/MM3 (ref 3.77–5.28)
SODIUM SERPL-SCNC: 139 MMOL/L (ref 136–145)
URATE SERPL-MCNC: 4.9 MG/DL (ref 2.4–5.7)
WBC NRBC COR # BLD AUTO: 11.29 10*3/MM3 (ref 3.4–10.8)

## 2024-07-05 PROCEDURE — 84156 ASSAY OF PROTEIN URINE: CPT | Performed by: NURSE PRACTITIONER

## 2024-07-05 PROCEDURE — 83615 LACTATE (LD) (LDH) ENZYME: CPT | Performed by: NURSE PRACTITIONER

## 2024-07-05 PROCEDURE — 85025 COMPLETE CBC W/AUTO DIFF WBC: CPT | Performed by: NURSE PRACTITIONER

## 2024-07-05 PROCEDURE — 82570 ASSAY OF URINE CREATININE: CPT | Performed by: NURSE PRACTITIONER

## 2024-07-05 PROCEDURE — 84550 ASSAY OF BLOOD/URIC ACID: CPT | Performed by: NURSE PRACTITIONER

## 2024-07-05 PROCEDURE — 80053 COMPREHEN METABOLIC PANEL: CPT | Performed by: NURSE PRACTITIONER

## 2024-07-05 NOTE — PROGRESS NOTES
Weekly MFM glucose log evaluation. No adjustments needed at this time. Patient to continue the following medication regimen:    Metformin: 1000mg BID  Glyburide: 1.25mg AM and 2.5mg HS  Acarbose: 25mg with dinner    MFM will review glucose logs again on 7/12.

## 2024-07-12 ENCOUNTER — DOCUMENTATION (OUTPATIENT)
Dept: OBSTETRICS AND GYNECOLOGY | Facility: CLINIC | Age: 39
End: 2024-07-12
Payer: COMMERCIAL

## 2024-07-12 NOTE — PROGRESS NOTES
Weekly MFM glucose log evaluation. No adjustments needed at this time. Patient to continue the following regimen:    Metformin: 1000mg BID  Glyburide: 1.25mg AM and 2.5mg QHS    MFM will review glucose logs again on 7/18.

## 2024-07-17 ENCOUNTER — DOCUMENTATION (OUTPATIENT)
Dept: OBSTETRICS AND GYNECOLOGY | Facility: CLINIC | Age: 39
End: 2024-07-17
Payer: COMMERCIAL

## 2024-07-17 NOTE — PROGRESS NOTES
Received [employer] message from patient noting that she was in process of completing her 24 hours urine but will need another collection container. Patient had questions regarding restarting the 24 hour urine to obtain 2 containers or continuing with the current collection and stop once the first container is full. Encouraged patient to reach out to the lab to obtain two collection containers if she felt like she would need them to ensure all urine is collected in a 24 hour period. Patient reports she will stop by the lab tomorrow (7/18) to collect the containers and plans to restart her 24 hour urine next Monday (7/22) and turn in prior to OB appointment on 7/23.

## 2024-07-18 ENCOUNTER — OFFICE VISIT (OUTPATIENT)
Dept: OBSTETRICS AND GYNECOLOGY | Facility: CLINIC | Age: 39
End: 2024-07-18
Payer: COMMERCIAL

## 2024-07-18 ENCOUNTER — HOSPITAL ENCOUNTER (OUTPATIENT)
Dept: ULTRASOUND IMAGING | Facility: HOSPITAL | Age: 39
Discharge: HOME | End: 2024-07-18
Admitting: OBSTETRICS & GYNECOLOGY
Payer: COMMERCIAL

## 2024-07-18 VITALS
DIASTOLIC BLOOD PRESSURE: 61 MMHG | TEMPERATURE: 98.7 F | HEART RATE: 104 BPM | SYSTOLIC BLOOD PRESSURE: 136 MMHG | WEIGHT: 293 LBS | HEIGHT: 68 IN | BODY MASS INDEX: 44.41 KG/M2

## 2024-07-18 DIAGNOSIS — O24.919 DIABETES MELLITUS COMPLICATING PREGNANCY, UNSPECIFIED TRIMESTER: Primary | ICD-10-CM

## 2024-07-18 DIAGNOSIS — O16.9 HYPERTENSION AFFECTING PREGNANCY, ANTEPARTUM: ICD-10-CM

## 2024-07-18 DIAGNOSIS — O24.919 DIABETES MELLITUS AFFECTING PREGNANCY, UNSPECIFIED TRIMESTER: ICD-10-CM

## 2024-07-18 DIAGNOSIS — O09.529 AMA (ADVANCED MATERNAL AGE) MULTIGRAVIDA 35+, UNSPECIFIED TRIMESTER: ICD-10-CM

## 2024-07-18 DIAGNOSIS — O09.519 ADVANCED MATERNAL AGE, PRIMIGRAVIDA, ANTEPARTUM: ICD-10-CM

## 2024-07-18 LAB — GLUCOSE BLDC GLUCOMTR-MCNC: 81 MG/DL (ref 70–130)

## 2024-07-18 PROCEDURE — 76811 OB US DETAILED SNGL FETUS: CPT

## 2024-07-18 PROCEDURE — 82948 REAGENT STRIP/BLOOD GLUCOSE: CPT

## 2024-07-18 NOTE — LETTER
July 18, 2024       No Recipients    Patient: Lavern Rehman   YOB: 1985   Date of Visit: 7/18/2024       Dear Darius Uribe MD    Lavern Rehman was in my office today. Below is a copy of my note.    If you have questions, please do not hesitate to call me. I look forward to following Lavern along with you.         Sincerely,        Tamiko Curry MD      MATERNAL FETAL MEDICINE Consult Note    Dear Dr Darius Uribe MD:    Thank you for your kind referral of Lavern Rehman.  As you know, she is a 39 y.o. G 2 P 0010 at 21  0/7 weeks gestation (Estimated Date of Delivery: 11/28/24). This is a consult.     Her antepartum course is complicated by:  Chronic Hypertension  Type 2 DM  AMA    Aneuploidy Screening: NIPT - Negative    HPI: Today, she denies headache, blurry vision, RUQ pain. No vaginal bleeding, no contractions.     Review of History:  Past Medical History:   Diagnosis Date   • Adhesive capsulitis of left shoulder 09/08/2022    Formatting of this note might be different from the original.   Added automatically from request for surgery [id number]     • Asthma 02/01/1991   • Congenital cystic kidney disease 05/20/2024   • Diabetes    • TERE (generalized anxiety disorder)    • GERD (gastroesophageal reflux disease)    • HTN (hypertension)    • Hypertension      Past Surgical History:   Procedure Laterality Date   • DILATATION AND CURETTAGE     • KNEE SURGERY     • LAPAROSCOPIC CHOLECYSTECTOMY     • SHOULDER SURGERY       Social History     Socioeconomic History   • Marital status:    Tobacco Use   • Smoking status: Never   • Smokeless tobacco: Never   Vaping Use   • Vaping status: Former   Substance and Sexual Activity   • Alcohol use: Not Currently   • Drug use: Not Currently   • Sexual activity: Yes     Partners: Male     Birth control/protection: Birth control pill     Family History   Problem Relation Age of Onset   • Hypertension Father    • Breast cancer Maternal Grandmother     • Hypertension Maternal Grandfather    • Ovarian cancer Neg Hx    • Uterine cancer Neg Hx    • Colon cancer Neg Hx       Allergies   Allergen Reactions   • Shellfish-Derived Products Hives     N/v.   • Shellfish Allergy Hives   • Oxycodone-Acetaminophen Hives, Itching and Rash      Current Outpatient Medications on File Prior to Visit   Medication Sig Dispense Refill   • acarbose (PRECOSE) 25 MG tablet Take 1 tablet by mouth Daily With Dinner. 30 tablet 6   • amLODIPine (NORVASC) 10 MG tablet Take 1 tablet by mouth Daily.     • aspirin 81 MG chewable tablet Chew 1 tablet Daily. 60 tablet 3   • budesonide-formoterol (SYMBICORT) 80-4.5 MCG/ACT inhaler Inhale 2 puffs 2 (Two) Times a Day.     • ferrous sulfate 325 (65 FE) MG tablet Take 1 tablet by mouth Daily.     • glucose blood test strip Use to check blood sugar 7 times a day. Patient has a One Touch Ultra 2 glucometer. 200 each 10   • glyburide (DIAbeta) 2.5 MG tablet Take 1 tablet by mouth Take As Directed. Take 1.25mg with breakfast and 2.5mg at bedtime 60 tablet 8   • ketoconazole (NIZORAL) 2 % cream prn     • metFORMIN ER (GLUCOPHAGE-XR) 500 MG 24 hr tablet Take 2 tablets by mouth Daily With Breakfast & Dinner. 120 tablet 8   • OneTouch Delica Lancets 30G misc 1 Lancet by Other route Take As Directed. Use to check blood sugar 7 times a day. 200 each 10   • pantoprazole (PROTONIX) 20 MG EC tablet Take 1 tablet by mouth Daily.     • prenatal vitamins (PRENATAL 27-1) 27-1 MG tablet tablet Take  by mouth Daily.     • PROVENTIL  (90 BASE) MCG/ACT inhaler INHALE 1 PUFF EVERY 4 HOURS AS NEEDED. 6.7 inhaler 0     No current facility-administered medications on file prior to visit.        Past obstetric, gynecological, medical, surgical, family and social history reviewed.  Relevant lab work and imaging reviewed.    Review of systems  Constitutional:  denies fever, chills, malaise.   ENT/Mouth:  denies sore throat, tinnitus  Eyes: denies vision  "changes/pain  CV:  denies chest pain  Respiratory:  denies cough/SOB  GI:  denies N/V, diarrhea, abdominal pain.    :   denies dysuria  Skin:  denies lesions or pruritus   Neuro:  denies weakness, focal neurologic symptoms    Vitals:    24 1111   BP: 136/61   BP Location: Right arm   Patient Position: Sitting   Pulse: 104   Temp: 98.7 °F (37.1 °C)   TempSrc: Temporal   Weight: 133 kg (294 lb)   Height: 172.7 cm (68\")         PHYSICAL EXAM   GENERAL: Not in acute distress, AAOx3, pleasant  CARDIO: regular rate and rhythm  PULM: symmetric chest rise, speaking in complete sentences without difficulty  NEURO: awake, alert and oriented to person, place, and time  ABDOMINAL: No fundal tenderness, no rebound or guarding, gravid  EXTREMITIES: no bilateral lower extremity edema/tenderness  SKIN: Warm, well-perfused      ULTRASOUND   Please view full ultrasound note on Imaging tab in ViewPoint.  Breech presentation.  Anterior placenta.  MVP 3.9 cm, which is normal.    g (AC 91%)  Normal appearing visualized anatomy with suboptimal heart, face, and spine views.    Transabdominal CL >3.5 cm, which is normal.    ASSESSMENT/COUNSELIN y.o. G 2 P 0010 at 21  0/7 weeks gestation (Estimated Date of Delivery: 24).     -Pregnancy  [ X ] stable  [   ] improving [  ] worsening    Diagnoses and all orders for this visit:    1. Diabetes mellitus complicating pregnancy, unspecified trimester (Primary)    2. Hypertension affecting pregnancy, antepartum    3. Advanced maternal age, primigravida, antepartum           ADVANCED MATERNAL AGE  NIPT wnl.    Recommend fetal growth surveillance. Start  fetal surveillance with weekly BPP at 32-36 weeks.  Recommend baby ASA, which she is taking.        CHRONIC HYPERTENSION  Amlodipine 10 mg   Previously counseled. Acceptable blood pressures in pregnancies with chronic hypertension and without renal damage are 120-140/70-90s. Newer data from the [facility] (2022, CHAP " TRIAL), supports more aggressive bp treatment to below 140/90 (previously, 160 in CHTN) and that this does not impair fetal growth or well-being but reduces risks of pre-eclampsia,  birth, and other pregnancy morbidity.  I discussed this with the patient.      I recommended serial growth ultrasounds every 4 weeks  to ensure appropriate fetal growth. In addition,  fetal testing 1-2x weekly should be initiated around 32 weeks gestation.  I would recommend a 38 week delivery given CHTN that has previously required medication.  She may require closer to 37 depending on her control closer to delivery.        PREGESTATIONAL DM, TYPE 2  Previously counseled.  Last A1c 2024 6.5  EKG wnl May 2024  Reports normal optho appt in May 2024    She brought her logs and everything is looking great.  Did not make changes to below regimen.  Congratulated.  Will continue orals as she is doing well.     CURRENT REGIMEN  Metformin 1000 mg PO BID with meals  Glyburide 1.25 AM/2.5 mg QHS  Acarbose 25 mg with dinner    We discussed diet recommendations, and goal pre-prandial values below 90 and 1-hour post-prandial values below 120 to optimize her pregnancy outcome as many studies have demonstrated that this is the normal range for pregnant women without diabetes.I have calculated her weight based insulin dosing to add to her Metformin 1000 BID.  Her fastings [id number], preprandials  and postprandials 140-150's.   We discussed meal timing and eating every 2-3 hours with 30g carbs at breakfast, 45 for lunch and dinner, and 10-15g snacks in between and at bedtime.  We also discussed insulin teaching and hypoglycemia management and to call with BG <60 or >200.      We also discussed preeclampsia risk reduction with baby aspirin, which she is taking daily.    Recommendations:  -ASA 81 mg--continue daily  -normal PIH labs, 24 hour urine pt says she is doing this weekend  -Continue ADA diet with carbohydrate counting.     -Continue glucose monitoring 6-7 times daily as instructed.  -Continue medications as directed  -Fetal ECHO scheduled  -Ultrasound for fetal growth every 4 weeks  - testing at 32 weeks gestation, twice weekly between MFM and primary OB  -Continue routine prenatal care with primary ob team   -At present goal for delivery is 38 weeks for CHTN and DM    Follow-up:  4 weeks for growth    Thank you for the consult and opportunity to care for this patient.  Please feel free to reach out with any questions or concerns.      I spent 20 minutes caring for this patient on this date of service. This time includes time spent by me in the following activities: preparing for the visit, reviewing tests, obtaining and/or reviewing a separately obtained history, performing a medically appropriate examination and/or evaluation, counseling and educating the patient/family/caregiver and independently interpreting results and communicating that information with the patient/family/caregiver with greater than 50% spent in counseling and coordination of care.     4 minutes reading US.    Tamiko Curry MD [facility] [place]aternal Fetal Medicine-  Office: [phone]  [email or web address]

## 2024-07-18 NOTE — PROGRESS NOTES
Pt reports that she is doing well and denies vaginal bleeding, cramping, contractions or LOF at this time. Reports active fetal movement. Reviewed when to call OB office or present to L&D for evaluation with symptoms such as decreased fetal movement, vaginal bleeding, LOF or ctxs. Pt verbalized understanding. Denies HA, visual changes or epigastric pain. Denies any additional complaints at time of appointment. Next OB appointment scheduled for 07/23/2024.    Vitals:    07/18/24 1111   Pulse: 104   Temp: 98.7 °F (37.1 °C)

## 2024-07-18 NOTE — PROGRESS NOTES
MATERNAL FETAL MEDICINE Consult Note    Dear Dr Darius Uribe MD:    Thank you for your kind referral of Lavern Rehman.  As you know, she is a 39 y.o. G 2 P 0010 at 21  0/7 weeks gestation (Estimated Date of Delivery: 11/28/24). This is a consult.     Her antepartum course is complicated by:  Chronic Hypertension  Type 2 DM  AMA    Aneuploidy Screening: NIPT - Negative    HPI: Today, she denies headache, blurry vision, RUQ pain. No vaginal bleeding, no contractions.     Review of History:  Past Medical History:   Diagnosis Date    Adhesive capsulitis of left shoulder 09/08/2022    Formatting of this note might be different from the original.   Added automatically from request for surgery [id number]      Asthma 02/01/1991    Congenital cystic kidney disease 05/20/2024    Diabetes     TERE (generalized anxiety disorder)     GERD (gastroesophageal reflux disease)     HTN (hypertension)     Hypertension      Past Surgical History:   Procedure Laterality Date    DILATATION AND CURETTAGE      KNEE SURGERY      LAPAROSCOPIC CHOLECYSTECTOMY      SHOULDER SURGERY       Social History     Socioeconomic History    Marital status:    Tobacco Use    Smoking status: Never    Smokeless tobacco: Never   Vaping Use    Vaping status: Former   Substance and Sexual Activity    Alcohol use: Not Currently    Drug use: Not Currently    Sexual activity: Yes     Partners: Male     Birth control/protection: Birth control pill     Family History   Problem Relation Age of Onset    Hypertension Father     Breast cancer Maternal Grandmother     Hypertension Maternal Grandfather     Ovarian cancer Neg Hx     Uterine cancer Neg Hx     Colon cancer Neg Hx       Allergies   Allergen Reactions    Shellfish-Derived Products Hives     N/v.    Shellfish Allergy Hives    Oxycodone-Acetaminophen Hives, Itching and Rash      Current Outpatient Medications on File Prior to Visit   Medication Sig Dispense Refill    acarbose (PRECOSE) 25 MG tablet  "Take 1 tablet by mouth Daily With Dinner. 30 tablet 6    amLODIPine (NORVASC) 10 MG tablet Take 1 tablet by mouth Daily.      aspirin 81 MG chewable tablet Chew 1 tablet Daily. 60 tablet 3    budesonide-formoterol (SYMBICORT) 80-4.5 MCG/ACT inhaler Inhale 2 puffs 2 (Two) Times a Day.      ferrous sulfate 325 (65 FE) MG tablet Take 1 tablet by mouth Daily.      glucose blood test strip Use to check blood sugar 7 times a day. Patient has a One Touch Ultra 2 glucometer. 200 each 10    glyburide (DIAbeta) 2.5 MG tablet Take 1 tablet by mouth Take As Directed. Take 1.25mg with breakfast and 2.5mg at bedtime 60 tablet 8    ketoconazole (NIZORAL) 2 % cream prn      metFORMIN ER (GLUCOPHAGE-XR) 500 MG 24 hr tablet Take 2 tablets by mouth Daily With Breakfast & Dinner. 120 tablet 8    OneTouch Delica Lancets 30G misc 1 Lancet by Other route Take As Directed. Use to check blood sugar 7 times a day. 200 each 10    pantoprazole (PROTONIX) 20 MG EC tablet Take 1 tablet by mouth Daily.      prenatal vitamins (PRENATAL 27-1) 27-1 MG tablet tablet Take  by mouth Daily.      PROVENTIL  (90 BASE) MCG/ACT inhaler INHALE 1 PUFF EVERY 4 HOURS AS NEEDED. 6.7 inhaler 0     No current facility-administered medications on file prior to visit.        Past obstetric, gynecological, medical, surgical, family and social history reviewed.  Relevant lab work and imaging reviewed.    Review of systems  Constitutional:  denies fever, chills, malaise.   ENT/Mouth:  denies sore throat, tinnitus  Eyes: denies vision changes/pain  CV:  denies chest pain  Respiratory:  denies cough/SOB  GI:  denies N/V, diarrhea, abdominal pain.    :   denies dysuria  Skin:  denies lesions or pruritus   Neuro:  denies weakness, focal neurologic symptoms    Vitals:    07/18/24 1111   BP: 136/61   BP Location: Right arm   Patient Position: Sitting   Pulse: 104   Temp: 98.7 °F (37.1 °C)   TempSrc: Temporal   Weight: 133 kg (294 lb)   Height: 172.7 cm (68\") "         PHYSICAL EXAM   GENERAL: Not in acute distress, AAOx3, pleasant  CARDIO: regular rate and rhythm  PULM: symmetric chest rise, speaking in complete sentences without difficulty  NEURO: awake, alert and oriented to person, place, and time  ABDOMINAL: No fundal tenderness, no rebound or guarding, gravid  EXTREMITIES: no bilateral lower extremity edema/tenderness  SKIN: Warm, well-perfused      ULTRASOUND   Please view full ultrasound note on Imaging tab in View[employer].  Breech presentation.  Anterior placenta.  MVP 3.9 cm, which is normal.    g (AC 91%)  Normal appearing visualized anatomy with suboptimal heart, face, and spine views.    Transabdominal CL >3.5 cm, which is normal.    ASSESSMENT/COUNSELIN y.o. G 2 P 0010 at 21  0/7 weeks gestation (Estimated Date of Delivery: 24).     -Pregnancy  [ X ] stable  [   ] improving [  ] worsening    Diagnoses and all orders for this visit:    1. Diabetes mellitus complicating pregnancy, unspecified trimester (Primary)    2. Hypertension affecting pregnancy, antepartum    3. Advanced maternal age, primigravida, antepartum           ADVANCED MATERNAL AGE  NIPT wnl.    Recommend fetal growth surveillance. Start  fetal surveillance with weekly BPP at 32-36 weeks.  Recommend baby ASA, which she is taking.        CHRONIC HYPERTENSION  Amlodipine 10 mg   Previously counseled. Acceptable blood pressures in pregnancies with chronic hypertension and without renal damage are 120-140/70-90s. Newer data from the [facility] (2022, CHAP TRIAL), supports more aggressive bp treatment to below 140/90 (previously, 160 in CHTN) and that this does not impair fetal growth or well-being but reduces risks of pre-eclampsia,  birth, and other pregnancy morbidity.  I discussed this with the patient.      I recommended serial growth ultrasounds every 4 weeks  to ensure appropriate fetal growth. In addition,  fetal testing 1-2x weekly should be initiated  around 32 weeks gestation.  I would recommend a 38 week delivery given CHTN that has previously required medication.  She may require closer to 37 depending on her control closer to delivery.        PREGESTATIONAL DM, TYPE 2  Previously counseled.  Last A1c 2024 6.5  EKG wnl May 2024  Reports normal optho appt in May 2024    She brought her logs and everything is looking great.  Did not make changes to below regimen.  Congratulated.  Will continue orals as she is doing well.     CURRENT REGIMEN  Metformin 1000 mg PO BID with meals  Glyburide 1.25 AM/2.5 mg QHS  Acarbose 25 mg with dinner    We discussed diet recommendations, and goal pre-prandial values below 90 and 1-hour post-prandial values below 120 to optimize her pregnancy outcome as many studies have demonstrated that this is the normal range for pregnant women without diabetes.I have calculated her weight based insulin dosing to add to her Metformin 1000 BID.  Her fastings [id number], preprandials  and postprandials 140-150's.   We discussed meal timing and eating every 2-3 hours with 30g carbs at breakfast, 45 for lunch and dinner, and 10-15g snacks in between and at bedtime.  We also discussed insulin teaching and hypoglycemia management and to call with BG <60 or >200.      We also discussed preeclampsia risk reduction with baby aspirin, which she is taking daily.    Recommendations:  -ASA 81 mg--continue daily  -normal PIH labs, 24 hour urine pt says she is doing this weekend  -Continue ADA diet with carbohydrate counting.    -Continue glucose monitoring 6-7 times daily as instructed.  -Continue medications as directed  -Fetal ECHO scheduled  -Ultrasound for fetal growth every 4 weeks  - testing at 32 weeks gestation, twice weekly between M and primary OB  -Continue routine prenatal care with primary ob team   -At present goal for delivery is 38 weeks for CHTN and DM    Follow-up:  4 weeks for growth    Thank you for the consult and  opportunity to care for this patient.  Please feel free to reach out with any questions or concerns.      I spent 20 minutes caring for this patient on this date of service. This time includes time spent by me in the following activities: preparing for the visit, reviewing tests, obtaining and/or reviewing a separately obtained history, performing a medically appropriate examination and/or evaluation, counseling and educating the patient/family/caregiver and independently interpreting results and communicating that information with the patient/family/caregiver with greater than 50% spent in counseling and coordination of care.     4 minutes reading US.    Tamiko Curry MD FACOG  Maternal Fetal Medicine-[facility]  Office: [phone]  [email or web address]

## 2024-07-23 ENCOUNTER — ROUTINE PRENATAL (OUTPATIENT)
Dept: OBSTETRICS AND GYNECOLOGY | Facility: CLINIC | Age: 39
End: 2024-07-23
Payer: COMMERCIAL

## 2024-07-23 ENCOUNTER — LAB (OUTPATIENT)
Dept: LAB | Facility: HOSPITAL | Age: 39
End: 2024-07-23
Payer: COMMERCIAL

## 2024-07-23 VITALS — BODY MASS INDEX: 45.01 KG/M2 | SYSTOLIC BLOOD PRESSURE: 161 MMHG | WEIGHT: 293 LBS | DIASTOLIC BLOOD PRESSURE: 72 MMHG

## 2024-07-23 DIAGNOSIS — Z34.82 PRENATAL CARE, SUBSEQUENT PREGNANCY IN SECOND TRIMESTER: ICD-10-CM

## 2024-07-23 DIAGNOSIS — O16.9 HYPERTENSION AFFECTING PREGNANCY, ANTEPARTUM: ICD-10-CM

## 2024-07-23 DIAGNOSIS — Z3A.21 21 WEEKS GESTATION OF PREGNANCY: Primary | ICD-10-CM

## 2024-07-23 DIAGNOSIS — O09.519 ADVANCED MATERNAL AGE, PRIMIGRAVIDA, ANTEPARTUM: ICD-10-CM

## 2024-07-23 DIAGNOSIS — O24.919 DIABETES MELLITUS COMPLICATING PREGNANCY, UNSPECIFIED TRIMESTER: ICD-10-CM

## 2024-07-23 LAB
COLLECT DURATION TIME UR: 24 HRS
GLUCOSE UR STRIP-MCNC: NEGATIVE MG/DL
PROT 24H UR-MRATE: NORMAL G/(24.H)
PROT UR STRIP-MCNC: NEGATIVE MG/DL
SPECIMEN VOL 24H UR: 6500 ML

## 2024-07-23 PROCEDURE — 84156 ASSAY OF PROTEIN URINE: CPT | Performed by: NURSE PRACTITIONER

## 2024-07-23 PROCEDURE — 81050 URINALYSIS VOLUME MEASURE: CPT | Performed by: NURSE PRACTITIONER

## 2024-07-23 PROCEDURE — 0502F SUBSEQUENT PRENATAL CARE: CPT | Performed by: OBSTETRICS & GYNECOLOGY

## 2024-07-23 NOTE — PROGRESS NOTES
OB follow up     Lavern Rehman is a 39 y.o.  21w5d being seen today for her obstetrical visit.  Patient reports no complaints. Fetal movement: normal.    Her prenatal care is complicated by (and status): Advanced maternal age, chronic hypertension and  type 2 diabetes.    Review of Systems  Cramping/contractions : Negative  Vaginal bleeding: Negative   150 fetal movement is normal    /72   Wt 134 kg (296 lb)   LMP 2024 (Exact Date)   BMI 45.01 kg/m²     FHT: 150 BPM   Uterine Size: size equals dates       Assessment    Diagnoses and all orders for this visit:    1. 21 weeks gestation of pregnancy (Primary)  -     POC Urinalysis Dipstick    2. Prenatal care, subsequent pregnancy in second trimester    3. Diabetes mellitus complicating pregnancy, unspecified trimester    4. Hypertension affecting pregnancy, antepartum    5. Advanced maternal age, primigravida, antepartum        1) pregnancy at 21w5d     Repeat blood pressure after the visit was 155/72.    Plan    Reviewed this stage of pregnancy  Problem list updated   Follow up in 4 weeks.  She will continue follow-up with maternal-fetal medicine.    Darius Uribe MD   2024  16:03 EDT

## 2024-07-25 ENCOUNTER — PATIENT MESSAGE (OUTPATIENT)
Dept: OBSTETRICS AND GYNECOLOGY | Facility: CLINIC | Age: 39
End: 2024-07-25
Payer: COMMERCIAL

## 2024-07-26 ENCOUNTER — DOCUMENTATION (OUTPATIENT)
Dept: OBSTETRICS AND GYNECOLOGY | Facility: CLINIC | Age: 39
End: 2024-07-26
Payer: COMMERCIAL

## 2024-07-26 RX ORDER — ACARBOSE 25 MG/1
25 TABLET ORAL TAKE AS DIRECTED
Qty: 60 TABLET | Refills: 6 | Status: SHIPPED | OUTPATIENT
Start: 2024-07-26

## 2024-07-26 NOTE — PROGRESS NOTES
Weekly MFM glucose log evaluation. The following adjustments were made:    Metformin: continue with 1000mg with breakfast and dinner  Glyburide: continue with 1.25mg with breakfast and 2.5mg at bedtime  Acarbose: 25mg with lunch and dinner    MFM will review glucose logs again on 8/2.

## 2024-07-30 ENCOUNTER — HOSPITAL ENCOUNTER (OUTPATIENT)
Dept: ULTRASOUND IMAGING | Facility: HOSPITAL | Age: 39
Discharge: HOME | End: 2024-07-30
Admitting: NURSE PRACTITIONER
Payer: COMMERCIAL

## 2024-07-30 DIAGNOSIS — O24.919 DIABETES MELLITUS COMPLICATING PREGNANCY, UNSPECIFIED TRIMESTER: ICD-10-CM

## 2024-07-30 PROCEDURE — 76827 ECHO EXAM OF FETAL HEART: CPT

## 2024-07-30 PROCEDURE — 93325 DOPPLER ECHO COLOR FLOW MAPG: CPT

## 2024-07-30 PROCEDURE — 76825 ECHO EXAM OF FETAL HEART: CPT

## 2024-07-30 NOTE — PROGRESS NOTES
Fetal Cardiology Inpatient Consult Note    Patient Name:Lavern Rehman  :1985  Medical Record Number:[id number]  Date of Service:2024  Requesting Obstetrician:Dr. Curry  Primary Obstetrician:Dr. Uribe  Consult Location: [facility]    Reason for Visit:  Incomplete cardiac views on Obstetrical Ultrasound and Maternal Diabetes    History: I had the pleasure of seeing your patient, Lavern Rehman, today for a Fetal Cardiology Initial Consultation.  Fetal cardiology evaluation was requested due to Incomplete cardiac views on Obstetrical Ultrasound and Maternal Diabetes.      Lavern is a 39 y.o. woman who presents today at 22 5/7 weeks gestation, with a given due date of 24.  This pregnancy was conceived naturally. Pregnancy has been complicated by type 2 DM with most recent A1c 6.5%. Prenatal testing has been normal.. Prenatal imaging has demonstrated suboptimal cardiac views.  NIPT was performed and was low risk.    This is Lavern's second pregnancy.  She has 0 living children and has had 1 previous miscarriages and 0 previous elective abortions.    OB History          2    Para        Term                AB   1    Living             SAB   1    IAB        Ectopic        Molar        Multiple        Live Births                    Past Medical History:  Past Medical History:   Diagnosis Date    Adhesive capsulitis of left shoulder 2022    Formatting of this note might be different from the original.   Added automatically from request for surgery [id number]      Asthma 1991    Congenital cystic kidney disease 2024    Diabetes     TERE (generalized anxiety disorder)     GERD (gastroesophageal reflux disease)     HTN (hypertension)     Hypertension      Current Medications:    Current Outpatient Medications:     acarbose (PRECOSE) 25 MG tablet, Take 1 tablet by mouth Take As Directed. Take 25mg with lunch and dinner, Disp: 60  tablet, Rfl: 6    amLODIPine (NORVASC) 10 MG tablet, Take 1 tablet by mouth Daily., Disp: , Rfl:     aspirin 81 MG chewable tablet, Chew 1 tablet Daily., Disp: 60 tablet, Rfl: 3    budesonide-formoterol (SYMBICORT) 80-4.5 MCG/ACT inhaler, Inhale 2 puffs 2 (Two) Times a Day., Disp: , Rfl:     ferrous sulfate 325 (65 FE) MG tablet, Take 1 tablet by mouth Daily., Disp: , Rfl:     glucose blood test strip, Use to check blood sugar 7 times a day. Patient has a One Touch Ultra 2 glucometer., Disp: 200 each, Rfl: 10    glyburide (DIAbeta) 2.5 MG tablet, Take 1 tablet by mouth Take As Directed. Take 1.25mg with breakfast and 2.5mg at bedtime, Disp: 60 tablet, Rfl: 8    ketoconazole (NIZORAL) 2 % cream, prn, Disp: , Rfl:     metFORMIN ER (GLUCOPHAGE-XR) 500 MG 24 hr tablet, Take 2 tablets by mouth Daily With Breakfast & Dinner., Disp: 120 tablet, Rfl: 8    OneTouch Delica Lancets 30G misc, 1 Lancet by Other route Take As Directed. Use to check blood sugar 7 times a day., Disp: 200 each, Rfl: 10    pantoprazole (PROTONIX) 20 MG EC tablet, Take 1 tablet by mouth Daily., Disp: , Rfl:     prenatal vitamins (PRENATAL 27-1) 27-1 MG tablet tablet, Take  by mouth Daily., Disp: , Rfl:     PROVENTIL  (90 BASE) MCG/ACT inhaler, INHALE 1 PUFF EVERY 4 HOURS AS NEEDED., Disp: 6.7 inhaler, Rfl: 0    Family History:  Family History   Problem Relation Age of Onset    Hypertension Father     Breast cancer Maternal Grandmother     Hypertension Maternal Grandfather     Ovarian cancer Neg Hx     Uterine cancer Neg Hx     Colon cancer Neg Hx      There is no known family history of congenital heart disease or genetic abnormalities or early childhood death.    Imaging: A complete 2-D, color, and spectral doppler fetal echocardiogram was performed.  A full report is available separately.  In summary, today's findings show the following:     Normal fetal echocardiogram performed at 22 5/7 weeks  Normal segmental anatomy, chamber sizes, and  ventricular function  Normal ventricular wall thickness  No cardiac disease identified by fetal echo  Normal UA, UV, MCA, and DV Doppler    A complete, detailed fetal echocardiogram can identify most major heart defects.  However, there are known limitations to this examination including a limited ability to diagnose some small atrial or ventricular septal defects, minor valve abnormalities, persistent patency of the ductus arteriosus, coarctation of the aorta, and abnormalities in small structures such as coronary arteries and pulmonary veins.    Discussion:   Findings were explained to patient and and her family.  The echo display screens in our examination room and a sketch diagram of the heart  were used.  Questions were answered at length and patient expressed understanding.  I explained the normal fetal circulation, today's fetal echocardiogram findings, the expected course of pregnancy, the impact of today's results on the location and mode of delivery and the expected  course.     Impression: In summary, today's evaluation found the followin) Normal fetal echocardiogram-- no cardiac disease identified   2) Type 2 diabetes  3)  22 5/7 weeks gestation    Ms Rehman's fetal echocardiogram today demonstrated normal segmental anatomy, chamber sizes, ventricular function, and ventricular wall thickness. No cardiac disease was identified. The aortic arch was not well seen in sagittal views by 2D but flow was normal by color and PW Doppler. The 3VV was also reassuring without no concerns for ductal or aortic arch obstruction. The limitations of fetal echocardiogram were discussed as outlined above.     Maternal diabetes is one of the most common maternal conditions complicating pregnancy, affecting up to 3-10% of pregnancies. In pregestational diabetic pregnancies, there is a 5 fold increase of fetal CHD compared to the general population of women which may be related to preconceptual glycemic  control, particularly higher risk are those with Hgb A1C levels >8.5% in the first trimester. Insulin resistance acquired in the third trimester, or gestational diabetes, does not appear to confer an increased risk of CHD in the fetus. Fetuses may however develop ventricular hypertrophy late in gestation in the presence of poorly controlled maternal pregestational or gestational diabetes, and the degree of hypertrophy has been shown to be related to glycemic control. In women with Hgb A1C <6% in the second half of pregnancy, the effects are usually mild but at levels >6%, the effects can be important. In these circumstance, a fetal echocardiogram in the third trimester should be considered to assess for any ventricular hypertrophy and ventricular outflow obstruction which may affect delivery and or  plans (Diagnosis and Treatment of Fetal Cardiac Disease, Sunita et al, Circulation, 2014).      Based on today's echo, no further fetal echocardiograms are indicated during pregnancy unless new concerns arise on OB or MFM imaging. Anticipate routine  care with an echo only as needed for clinical concerns.     Recommendations:   1. There is no evidence of a ductal dependent fetal cardiac lesion.  I do not anticipate the need for immediate initiation of prostaglandin therapy and pediatric cardiology evaluation after birth.  2. There is no fetal cardiac indication to delivery at a hospital which provides specialized pediatric cardiac care.  Lavern is currently planning to delivery at [facility], which is appropriate from the standpoint of the fetal heart and circulation.  3. There is no increased fetal cardiac risk from a trial of labor and vaginal or cesearian delivery based on today's fetal cardiac findings.  4. Based on the results of today's examination, no further fetal echocardiograms are recommended during pregnancy.  5. Recommend routine  and well  after birth by a  primary care provider, further evaluation by pediatric cardiology is recommended only as needed.  6. I would be happy to see Lavern again during pregnancy for any changes, problems, or concerns that may arise.  Please do not hesitate to call with any questions you may have.    Thank you for allowing me to share with you in the care of your patient.    I personally spent 60 minutes of direct provider time for the visit today, including review of prior OB and MFM medical records, face-to-face discussion and review of fetal cardiac images in the examination room, and charting.     Stuart Galan MD  [facility]  Pediatric Cardiology  [phone]    7/30/2024  15:46 EDT

## 2024-07-31 DIAGNOSIS — E13.9 DIABETES MELLITUS OF OTHER TYPE WITHOUT COMPLICATION, UNSPECIFIED WHETHER LONG TERM INSULIN USE: Primary | ICD-10-CM

## 2024-07-31 RX ORDER — BLOOD-GLUCOSE METER
KIT MISCELLANEOUS
Qty: 1 EACH | Refills: 0 | Status: SHIPPED | OUTPATIENT
Start: 2024-07-31

## 2024-08-01 ENCOUNTER — TRANSCRIBE ORDERS (OUTPATIENT)
Dept: ULTRASOUND IMAGING | Facility: HOSPITAL | Age: 39
End: 2024-08-01
Payer: COMMERCIAL

## 2024-08-01 DIAGNOSIS — O09.529 AMA (ADVANCED MATERNAL AGE) MULTIGRAVIDA 35+, UNSPECIFIED TRIMESTER: ICD-10-CM

## 2024-08-01 DIAGNOSIS — O16.9 HYPERTENSION AFFECTING PREGNANCY, ANTEPARTUM: ICD-10-CM

## 2024-08-01 DIAGNOSIS — O24.919 DIABETES MELLITUS COMPLICATING PREGNANCY, UNSPECIFIED TRIMESTER: Primary | ICD-10-CM

## 2024-08-02 ENCOUNTER — DOCUMENTATION (OUTPATIENT)
Dept: OBSTETRICS AND GYNECOLOGY | Facility: CLINIC | Age: 39
End: 2024-08-02
Payer: COMMERCIAL

## 2024-08-02 NOTE — PROGRESS NOTES
Weekly MFM glucose log evaluation. The following adjustments were made:    Metformin: 1000mg BID  Glyburide: 1.25mg AM and 2.5mg QHS   Acarbose: 25mg TID    MFM will review glucose logs again on 8/9.

## 2024-08-09 ENCOUNTER — DOCUMENTATION (OUTPATIENT)
Dept: OBSTETRICS AND GYNECOLOGY | Facility: CLINIC | Age: 39
End: 2024-08-09
Payer: COMMERCIAL

## 2024-08-09 NOTE — PROGRESS NOTES
Weekly MFM glucose log evaluation: Unable to review blood sugar logs this week.   MFM will plan to review blood glucose logs next week.

## 2024-08-16 ENCOUNTER — OFFICE VISIT (OUTPATIENT)
Dept: OBSTETRICS AND GYNECOLOGY | Facility: CLINIC | Age: 39
End: 2024-08-16
Payer: COMMERCIAL

## 2024-08-16 ENCOUNTER — HOSPITAL ENCOUNTER (OUTPATIENT)
Dept: ULTRASOUND IMAGING | Facility: HOSPITAL | Age: 39
Discharge: HOME | End: 2024-08-16
Admitting: OBSTETRICS & GYNECOLOGY
Payer: COMMERCIAL

## 2024-08-16 VITALS
HEART RATE: 102 BPM | BODY MASS INDEX: 45.22 KG/M2 | WEIGHT: 293 LBS | DIASTOLIC BLOOD PRESSURE: 74 MMHG | SYSTOLIC BLOOD PRESSURE: 132 MMHG | TEMPERATURE: 97.8 F

## 2024-08-16 DIAGNOSIS — O09.529 AMA (ADVANCED MATERNAL AGE) MULTIGRAVIDA 35+, UNSPECIFIED TRIMESTER: ICD-10-CM

## 2024-08-16 DIAGNOSIS — O24.919 DIABETES MELLITUS COMPLICATING PREGNANCY, UNSPECIFIED TRIMESTER: ICD-10-CM

## 2024-08-16 DIAGNOSIS — O16.9 HYPERTENSION AFFECTING PREGNANCY, ANTEPARTUM: Primary | ICD-10-CM

## 2024-08-16 DIAGNOSIS — O16.9 HYPERTENSION AFFECTING PREGNANCY, ANTEPARTUM: ICD-10-CM

## 2024-08-16 LAB — GLUCOSE BLDC GLUCOMTR-MCNC: 163 MG/DL (ref 70–130)

## 2024-08-16 PROCEDURE — 82948 REAGENT STRIP/BLOOD GLUCOSE: CPT

## 2024-08-16 PROCEDURE — 76816 OB US FOLLOW-UP PER FETUS: CPT

## 2024-08-16 NOTE — PROGRESS NOTES
MATERNAL FETAL MEDICINE Consult Note    Dear Dr Darius Uribe MD:    Thank you for your kind referral of Lavern Rehman.  As you know, she is a 39 y.o. G 2 P 0010 at 25 0/7 weeks gestation (Estimated Date of Delivery: 11/28/24). This is a consult.     Her antepartum course is complicated by:  Chronic Hypertension  Type 2 DM  AMA    Aneuploidy Screening: NIPT - Negative    HPI: Today, she denies headache, blurry vision, RUQ pain. No vaginal bleeding, no contractions.     Review of History:  Past Medical History:   Diagnosis Date    Adhesive capsulitis of left shoulder 09/08/2022    Formatting of this note might be different from the original.   Added automatically from request for surgery [id number]      Asthma 02/01/1991    Congenital cystic kidney disease 05/20/2024    Diabetes     TERE (generalized anxiety disorder)     GERD (gastroesophageal reflux disease)     HTN (hypertension)     Hypertension      Past Surgical History:   Procedure Laterality Date    DILATATION AND CURETTAGE      KNEE SURGERY      LAPAROSCOPIC CHOLECYSTECTOMY      SHOULDER SURGERY       Social History     Socioeconomic History    Marital status:    Tobacco Use    Smoking status: Never    Smokeless tobacco: Never   Vaping Use    Vaping status: Former   Substance and Sexual Activity    Alcohol use: Not Currently    Drug use: Not Currently    Sexual activity: Yes     Partners: Male     Birth control/protection: Birth control pill     Family History   Problem Relation Age of Onset    Hypertension Father     Breast cancer Maternal Grandmother     Hypertension Maternal Grandfather     Ovarian cancer Neg Hx     Uterine cancer Neg Hx     Colon cancer Neg Hx       Allergies   Allergen Reactions    Shellfish-Derived Products Hives     N/v.    Shellfish Allergy Hives    Oxycodone-Acetaminophen Hives, Itching and Rash      Current Outpatient Medications on File Prior to Visit   Medication Sig Dispense Refill    acarbose (PRECOSE) 25 MG tablet Take  1 tablet by mouth Take As Directed. Take 25mg with lunch and dinner 60 tablet 6    amLODIPine (NORVASC) 10 MG tablet Take 1 tablet by mouth Daily.      aspirin 81 MG chewable tablet Chew 1 tablet Daily. 60 tablet 3    budesonide-formoterol (SYMBICORT) 80-4.5 MCG/ACT inhaler Inhale 2 puffs 2 (Two) Times a Day.      ferrous sulfate 325 (65 FE) MG tablet Take 1 tablet by mouth Daily.      glucose blood test strip Use to check blood sugar 7 times a day. Patient has a One Touch Ultra 2 glucometer. 200 each 10    glucose blood test strip Use as instructed 200 each 12    glucose monitor monitoring kit Use to check blood sugars 7 times daily, please dispense compatible lancets and test strips. 1 each 0    glyburide (DIAbeta) 2.5 MG tablet Take 1 tablet by mouth Take As Directed. Take 1.25mg with breakfast and 2.5mg at bedtime 60 tablet 8    Lancets 30G misc To test glucose 7 times daily 200 each 6    metFORMIN ER (GLUCOPHAGE-XR) 500 MG 24 hr tablet Take 2 tablets by mouth Daily With Breakfast & Dinner. 120 tablet 8    OneTouch Delica Lancets 30G misc 1 Lancet by Other route Take As Directed. Use to check blood sugar 7 times a day. 200 each 10    pantoprazole (PROTONIX) 20 MG EC tablet Take 1 tablet by mouth Daily.      prenatal vitamins (PRENATAL 27-1) 27-1 MG tablet tablet Take  by mouth Daily.      PROVENTIL  (90 BASE) MCG/ACT inhaler INHALE 1 PUFF EVERY 4 HOURS AS NEEDED. 6.7 inhaler 0    ketoconazole (NIZORAL) 2 % cream prn       No current facility-administered medications on file prior to visit.        Past obstetric, gynecological, medical, surgical, family and social history reviewed.  Relevant lab work and imaging reviewed.    Review of systems  Constitutional:  denies fever, chills, malaise.   ENT/Mouth:  denies sore throat, tinnitus  Eyes: denies vision changes/pain  CV:  denies chest pain  Respiratory:  denies cough/SOB  GI:  denies N/V, diarrhea, abdominal pain.    :   denies dysuria  Skin:  denies  lesions or pruritus   Neuro:  denies weakness, focal neurologic symptoms    Vitals:    24 1258   BP: 132/74   BP Location: Right arm   Patient Position: Sitting   Pulse: 102   Temp: 97.8 °F (36.6 °C)   TempSrc: Temporal   Weight: 135 kg (297 lb 6.4 oz)         PHYSICAL EXAM   GENERAL: Not in acute distress, AAOx3, pleasant  CARDIO: regular rate and rhythm  PULM: symmetric chest rise, speaking in complete sentences without difficulty  NEURO: awake, alert and oriented to person, place, and time  ABDOMINAL: No fundal tenderness, no rebound or guarding, gravid  EXTREMITIES: no bilateral lower extremity edema/tenderness  SKIN: Warm, well-perfused      ULTRASOUND   Please view full ultrasound note on Imaging tab in ViewPoint.  Cephalic presentation.  Anterior placenta.  ALBA 17 cm, which is normal.    g (67%, AC 68%)  Anatomy appears normal except suboptimal profile views.      ASSESSMENT/COUNSELIN y.o. G 2 P 0010 at 25 0/7 weeks gestation (Estimated Date of Delivery: 24).     -Pregnancy  [ X ] stable  [   ] improving [  ] worsening    Diagnoses and all orders for this visit:    1. Hypertension affecting pregnancy, antepartum (Primary)    2. Diabetes mellitus complicating pregnancy, unspecified trimester             ADVANCED MATERNAL AGE  NIPT wnl.    Recommend fetal growth surveillance. Start  fetal surveillance with weekly BPP at 32-36 weeks.  Recommend baby ASA, which she is taking.        CHRONIC HYPERTENSION  Amlodipine 10 mg   Previously counseled.   I recommended serial growth ultrasounds every 4 weeks  to ensure appropriate fetal growth. In addition,  fetal testing 1-2x weekly should be initiated around 32 weeks gestation.  I would recommend a 38 week delivery given CHTN that has previously required medication.  She may require closer to 37 depending on her control closer to delivery.        PREGESTATIONAL DM, TYPE 2  Previously counseled.  Last A1c 2024 6.5  EKG wnl May    Reports normal optho appt in May 2024    She brought her logs and everything is looking great.  Did not make changes to below regimen.  Congratulated.  Will continue orals as she is doing well. She stopped checking for a few days as they unfortunately had to put a dog down this week.  She will begin checking again.      CURRENT REGIMEN  Metformin 1000 mg PO BID with meals  Glyburide 1.25 AM/2.5 mg QHS  Acarbose 25 mg with dinner    We discussed diet recommendations, and goal pre-prandial values below 90 and 1-hour post-prandial values below 120 to optimize her pregnancy outcome as many studies have demonstrated that this is the normal range for pregnant women without diabetes.I have calculated her weight based insulin dosing to add to her Metformin 1000 BID.  Her fastings [id number], preprandials  and postprandials 140-150's.   We discussed meal timing and eating every 2-3 hours with 30g carbs at breakfast, 45 for lunch and dinner, and 10-15g snacks in between and at bedtime.  We also discussed insulin teaching and hypoglycemia management and to call with BG <60 or >200.      We also discussed preeclampsia risk reduction with baby aspirin, which she is taking daily.  Fetal ECHO was normal.     Recommendations:  -ASA 81 mg--continue daily  -normal PIH labs, 24 hour urine  -Continue ADA diet with carbohydrate counting.    -Continue glucose monitoring 6-7 times daily as instructed.  -Continue medications as directed  -Ultrasound for fetal growth every 4 weeks  - testing at 32 weeks gestation, twice weekly between [facility] and primary OB  -Continue routine prenatal care with primary ob team   -At present goal for delivery is 38 weeks for CHTN and DM    Follow-up:  4 weeks for growth    Thank you for the consult and opportunity to care for this patient.  Please feel free to reach out with any questions or concerns.      I spent 15 minutes caring for this patient on this date of service. This time includes  time spent by me in the following activities: preparing for the visit, reviewing tests, obtaining and/or reviewing a separately obtained history, performing a medically appropriate examination and/or evaluation, counseling and educating the patient/family/caregiver and independently interpreting results and communicating that information with the patient/family/caregiver with greater than 50% spent in counseling and coordination of care.     4 minutes reading US.    Tamiko Curry MD [facility]  Maternal Fetal Medicine-[facility]  Office: [phone]  [email or web address]

## 2024-08-16 NOTE — PROGRESS NOTES
Pt denies vaginal bleeding, cramping, contractions or LOF at this time. Reports active fetal movement. Reviewed when to call OB office or present to L&D for evaluation with symptoms such as decreased fetal movement, vaginal bleeding, LOF or ctxs. Pt verbalized understanding. Denies HA, visual changes or epigastric pain. Denies any additional complaints at time of appointment. Next OB appointment scheduled for 8/20.    Blood sugar logs on chart for MFM to review. Blood sugar taken with a value of 163 noted.     Vitals:    08/16/24 1258   BP: 132/74   Pulse: 102   Temp: 97.8 °F (36.6 °C)

## 2024-08-16 NOTE — LETTER
August 16, 2024     Darius Uribe MD  [address]  [facility] [address]  [place] [id number]    Patient: Lavern Rehman   YOB: 1985   Date of Visit: 8/16/2024       Dear Darius Uribe MD    Lavern Rehman was in my office today. Below is a copy of my note.    If you have questions, please do not hesitate to call me. I look forward to following Lavern along with you.         Sincerely,        Tamiko Curry MD        CC: No Recipients    No notes on file

## 2024-08-20 ENCOUNTER — ROUTINE PRENATAL (OUTPATIENT)
Dept: OBSTETRICS AND GYNECOLOGY | Facility: CLINIC | Age: 39
End: 2024-08-20
Payer: COMMERCIAL

## 2024-08-20 VITALS — WEIGHT: 293 LBS | SYSTOLIC BLOOD PRESSURE: 146 MMHG | DIASTOLIC BLOOD PRESSURE: 85 MMHG | BODY MASS INDEX: 45.4 KG/M2

## 2024-08-20 DIAGNOSIS — O24.919 DIABETES MELLITUS COMPLICATING PREGNANCY, UNSPECIFIED TRIMESTER: ICD-10-CM

## 2024-08-20 DIAGNOSIS — Z34.82 PRENATAL CARE, SUBSEQUENT PREGNANCY IN SECOND TRIMESTER: ICD-10-CM

## 2024-08-20 DIAGNOSIS — Z3A.25 25 WEEKS GESTATION OF PREGNANCY: Primary | ICD-10-CM

## 2024-08-20 DIAGNOSIS — O16.9 HYPERTENSION AFFECTING PREGNANCY, ANTEPARTUM: ICD-10-CM

## 2024-08-20 DIAGNOSIS — O09.519 ADVANCED MATERNAL AGE, PRIMIGRAVIDA, ANTEPARTUM: ICD-10-CM

## 2024-08-20 LAB
GLUCOSE UR STRIP-MCNC: NEGATIVE MG/DL
PROT UR STRIP-MCNC: NEGATIVE MG/DL

## 2024-08-20 PROCEDURE — 0502F SUBSEQUENT PRENATAL CARE: CPT | Performed by: OBSTETRICS & GYNECOLOGY

## 2024-08-20 RX ORDER — BLOOD-GLUCOSE METER
EACH MISCELLANEOUS
COMMUNITY
Start: 2024-08-19

## 2024-08-20 NOTE — PROGRESS NOTES
OB follow up     Lavern Rehman is a 39 y.o.  25w5d being seen today for her obstetrical visit.  Patient reports no complaints. Fetal movement: normal.    Her prenatal care is complicated by (and status): Advanced maternal age, chronic hypertension and  type 2 diabetes.     Review of Systems  Cramping/contractions : Negative  Vaginal bleeding: Is negative  Fetal movement is normal    /85   Wt 135 kg (298 lb 9.6 oz)   LMP 2024 (Exact Date)   BMI 45.40 kg/m²     FHT: 148 BPM   Uterine Size: size equals dates       Assessment    Diagnoses and all orders for this visit:    1. 25 weeks gestation of pregnancy (Primary)  -     Hemoglobin & Hematocrit, Blood; Future  -     T Pallidum Antibody w/ reflex RPR (Syphilis); Future  -     POC Urinalysis Dipstick    2. Prenatal care, subsequent pregnancy in second trimester  -     POC Urinalysis Dipstick    3. Hypertension affecting pregnancy, antepartum  -     POC Urinalysis Dipstick    4. Diabetes mellitus complicating pregnancy, unspecified trimester  -     POC Urinalysis Dipstick    5. Advanced maternal age, primigravida, antepartum  -     POC Urinalysis Dipstick        1) pregnancy at 25w5d         Plan    Reviewed this stage of pregnancy  Problem list updated   Follow up in 4 weeks.  Will do treponemal antibody and hematocrit at that time.  She continues to see maternal-fetal medicine and had a normal growth ultrasound recently.  She continues on metformin and glyburide for glucose control for type 2 diabetes.    Darius Uribe MD   2024  15:41 EDT

## 2024-08-23 ENCOUNTER — DOCUMENTATION (OUTPATIENT)
Dept: OBSTETRICS AND GYNECOLOGY | Facility: CLINIC | Age: 39
End: 2024-08-23
Payer: COMMERCIAL

## 2024-08-23 NOTE — PROGRESS NOTES
Weekly M glucose log evaluation: No blood sugar logs to review this week.   [facility] will plan to review blood glucose logs next week on 8/30.

## 2024-08-30 ENCOUNTER — TELEPHONE (OUTPATIENT)
Dept: OBSTETRICS AND GYNECOLOGY | Facility: CLINIC | Age: 39
End: 2024-08-30
Payer: COMMERCIAL

## 2024-08-30 RX ORDER — PEN NEEDLE, DIABETIC 30 GX3/16"
1 NEEDLE, DISPOSABLE MISCELLANEOUS 2 TIMES DAILY
Qty: 200 EACH | Refills: 3 | Status: SHIPPED | OUTPATIENT
Start: 2024-08-30

## 2024-08-30 NOTE — PROGRESS NOTES
Weekly MFM glucose log evaluation. The following adjustments were made:    Start Lantus 8/8.  Continue Metformin 1000mg BID    D/C glyburide and acarbose.     MFM will review glucose logs again on 9/6.

## 2024-09-03 ENCOUNTER — TRANSCRIBE ORDERS (OUTPATIENT)
Dept: ULTRASOUND IMAGING | Facility: HOSPITAL | Age: 39
End: 2024-09-03
Payer: COMMERCIAL

## 2024-09-03 DIAGNOSIS — O09.529 AMA (ADVANCED MATERNAL AGE) MULTIGRAVIDA 35+, UNSPECIFIED TRIMESTER: ICD-10-CM

## 2024-09-03 DIAGNOSIS — O16.9 HYPERTENSION AFFECTING PREGNANCY, ANTEPARTUM: ICD-10-CM

## 2024-09-03 DIAGNOSIS — O24.919 DIABETES MELLITUS COMPLICATING PREGNANCY, UNSPECIFIED TRIMESTER: Primary | ICD-10-CM

## 2024-09-06 ENCOUNTER — DOCUMENTATION (OUTPATIENT)
Dept: OBSTETRICS AND GYNECOLOGY | Facility: CLINIC | Age: 39
End: 2024-09-06
Payer: COMMERCIAL

## 2024-09-06 NOTE — PROGRESS NOTES
Weekly MFM glucose log evaluation. The following adjustments were made:    Lantus: 12/12  Metformin: 1000mg BID    MFM will review glucose logs again on 9/12.

## 2024-09-12 ENCOUNTER — HOSPITAL ENCOUNTER (OUTPATIENT)
Dept: ULTRASOUND IMAGING | Facility: HOSPITAL | Age: 39
Discharge: HOME | End: 2024-09-12
Admitting: OBSTETRICS & GYNECOLOGY
Payer: COMMERCIAL

## 2024-09-12 ENCOUNTER — OFFICE VISIT (OUTPATIENT)
Dept: OBSTETRICS AND GYNECOLOGY | Facility: CLINIC | Age: 39
End: 2024-09-12
Payer: COMMERCIAL

## 2024-09-12 VITALS
BODY MASS INDEX: 46.13 KG/M2 | SYSTOLIC BLOOD PRESSURE: 134 MMHG | DIASTOLIC BLOOD PRESSURE: 70 MMHG | HEART RATE: 103 BPM | TEMPERATURE: 97.8 F | WEIGHT: 293 LBS

## 2024-09-12 DIAGNOSIS — O16.9 HYPERTENSION AFFECTING PREGNANCY, ANTEPARTUM: ICD-10-CM

## 2024-09-12 DIAGNOSIS — O09.519 ADVANCED MATERNAL AGE, PRIMIGRAVIDA, ANTEPARTUM: ICD-10-CM

## 2024-09-12 DIAGNOSIS — O24.919 DIABETES MELLITUS COMPLICATING PREGNANCY, UNSPECIFIED TRIMESTER: Primary | ICD-10-CM

## 2024-09-12 DIAGNOSIS — O09.529 AMA (ADVANCED MATERNAL AGE) MULTIGRAVIDA 35+, UNSPECIFIED TRIMESTER: ICD-10-CM

## 2024-09-12 DIAGNOSIS — O24.919 DIABETES MELLITUS COMPLICATING PREGNANCY, UNSPECIFIED TRIMESTER: ICD-10-CM

## 2024-09-12 LAB — GLUCOSE BLDC GLUCOMTR-MCNC: 155 MG/DL (ref 70–130)

## 2024-09-12 PROCEDURE — 76816 OB US FOLLOW-UP PER FETUS: CPT

## 2024-09-12 PROCEDURE — 76819 FETAL BIOPHYS PROFIL W/O NST: CPT

## 2024-09-12 PROCEDURE — 82948 REAGENT STRIP/BLOOD GLUCOSE: CPT

## 2024-09-12 NOTE — PROGRESS NOTES
Pt reports that she is doing well and denies vaginal bleeding, cramping, contractions or LOF at this time. Reports active fetal movement. Reviewed when to call OB office or present to L&D for evaluation with symptoms such as decreased fetal movement, vaginal bleeding, LOF or ctxs. Pt verbalized understanding. Denies HA, visual changes or epigastric pain. Notes increased swelling in lower extremities, reports improvement with rest. Next OB appointment scheduled for 9/20.    Blood sugar logs on chart for MFM to review. Blood sugar taken at time of appointment with a value of 155 noted.     Vitals:    09/12/24 1355   BP: 134/70   Pulse:    Temp:

## 2024-09-12 NOTE — LETTER
September 12, 2024     Darius Uribe MD  [address]  [facility] [address]  [place] [id number]    Patient: Lavern Rehman   YOB: 1985   Date of Visit: 9/12/2024       Dear Darius Uribe MD    Lavern Rehman was in my office today. Below is a copy of my note.    If you have questions, please do not hesitate to call me. I look forward to following Lavern along with you.         Sincerely,        Tamiko Curry MD      MATERNAL FETAL MEDICINE Consult Note    Dear Dr Darius Uribe MD:    Thank you for your kind referral of Lavern Rehman.  As you know, she is a 39 y.o. G 2 P 0010 at 29 0/7 weeks gestation (Estimated Date of Delivery: 11/28/24). This is a consult.     Her antepartum course is complicated by:  Chronic Hypertension  Type 2 DM  AMA    Aneuploidy Screening: NIPT - Negative    HPI: Today, she denies headache, blurry vision, RUQ pain. No vaginal bleeding, no contractions.     Review of History:  Past Medical History:   Diagnosis Date   • Adhesive capsulitis of left shoulder 09/08/2022    Formatting of this note might be different from the original.   Added automatically from request for surgery [id number]     • Asthma 02/01/1991   • Congenital cystic kidney disease 05/20/2024   • Diabetes    • TERE (generalized anxiety disorder)    • GERD (gastroesophageal reflux disease)    • HTN (hypertension)    • Hypertension      Past Surgical History:   Procedure Laterality Date   • DILATATION AND CURETTAGE     • KNEE SURGERY     • LAPAROSCOPIC CHOLECYSTECTOMY     • SHOULDER SURGERY       Social History     Socioeconomic History   • Marital status:    Tobacco Use   • Smoking status: Never     Passive exposure: Never   • Smokeless tobacco: Never   Vaping Use   • Vaping status: Former   Substance and Sexual Activity   • Alcohol use: Not Currently   • Drug use: Not Currently   • Sexual activity: Yes     Partners: Male     Birth control/protection: Birth control pill     Family History   Problem  Relation Age of Onset   • Hypertension Father    • Breast cancer Maternal Grandmother    • Hypertension Maternal Grandfather    • Ovarian cancer Neg Hx    • Uterine cancer Neg Hx    • Colon cancer Neg Hx       Allergies   Allergen Reactions   • Shellfish-Derived Products Hives     N/v.   • Shellfish Allergy Hives   • Oxycodone-Acetaminophen Hives, Itching and Rash      Current Outpatient Medications on File Prior to Visit   Medication Sig Dispense Refill   • acarbose (PRECOSE) 25 MG tablet Take 1 tablet by mouth Take As Directed. Take 25mg with lunch and dinner 60 tablet 6   • amLODIPine (NORVASC) 10 MG tablet Take 1 tablet by mouth Daily.     • aspirin 81 MG chewable tablet Chew 1 tablet Daily. 60 tablet 3   • Blood Glucose Monitoring Suppl (ONE TOUCH ULTRA 2) w/Device kit      • budesonide-formoterol (SYMBICORT) 80-4.5 MCG/ACT inhaler Inhale 2 puffs 2 (Two) Times a Day.     • ferrous sulfate 325 (65 FE) MG tablet Take 1 tablet by mouth Daily.     • glucose blood test strip Use to check blood sugar 7 times a day. Patient has a One Touch Ultra 2 glucometer. 200 each 10   • glucose blood test strip Use as instructed 200 each 12   • glucose monitor monitoring kit Use to check blood sugars 7 times daily, please dispense compatible lancets and test strips. 1 each 0   • glyburide (DIAbeta) 2.5 MG tablet Take 1 tablet by mouth Take As Directed. Take 1.25mg with breakfast and 2.5mg at bedtime 60 tablet 8   • Insulin Glargine (LANTUS SOLOSTAR) 100 UNIT/ML injection pen Please dispense insulin pen. Variable dosing. Starting dose: 8 units AM, 8 units PM. 30 mL 6   • Insulin Pen Needle (Pen Needles) 32G X 4 MM misc Use 1 Needle 2 (Two) Times a Day. Use to inject insulin up to 5x daily. 200 each 3   • ketoconazole (NIZORAL) 2 % cream prn     • Lancets 30G misc To test glucose 7 times daily 200 each 6   • metFORMIN ER (GLUCOPHAGE-XR) 500 MG 24 hr tablet Take 2 tablets by mouth Daily With Breakfast & Dinner. 120 tablet 8   •  OneTouch Delica Lancets 30G misc 1 Lancet by Other route Take As Directed. Use to check blood sugar 7 times a day. 200 each 10   • pantoprazole (PROTONIX) 20 MG EC tablet Take 1 tablet by mouth Daily.     • prenatal vitamins (PRENATAL 27-1) 27-1 MG tablet tablet Take  by mouth Daily.     • PROVENTIL  (90 BASE) MCG/ACT inhaler INHALE 1 PUFF EVERY 4 HOURS AS NEEDED. 6.7 inhaler 0     No current facility-administered medications on file prior to visit.        Past obstetric, gynecological, medical, surgical, family and social history reviewed.  Relevant lab work and imaging reviewed.    Review of systems  Constitutional:  denies fever, chills, malaise.   ENT/Mouth:  denies sore throat, tinnitus  Eyes: denies vision changes/pain  CV:  denies chest pain  Respiratory:  denies cough/SOB  GI:  denies N/V, diarrhea, abdominal pain.    :   denies dysuria  Skin:  denies lesions or pruritus   Neuro:  denies weakness, focal neurologic symptoms    Vitals:    24 1348 24 1352 24 1355   BP: 152/70 126/64 134/70   BP Location: Right arm Left arm Right arm   Patient Position: Sitting Sitting Sitting   Pulse: 103     Temp: 97.8 °F (36.6 °C)     TempSrc: Temporal     Weight: (!) 138 kg (303 lb 6.4 oz)             PHYSICAL EXAM   GENERAL: Not in acute distress, AAOx3, pleasant  CARDIO: regular rate and rhythm  PULM: symmetric chest rise, speaking in complete sentences without difficulty  NEURO: awake, alert and oriented to person, place, and time  ABDOMINAL: No fundal tenderness, no rebound or guarding, gravid  EXTREMITIES: no bilateral lower extremity edema/tenderness  SKIN: Warm, well-perfused      ULTRASOUND   Please view full ultrasound note on Imaging tab in ViewPoint.  Cephalic presentation.  Anterior placenta.  ALBA 23 cm, which is high normal.  EFW 1625 g (77%, AC 88%)  BPP 8  Follow up anatomy views appear normal.      ASSESSMENT/COUNSELIN y.o. G 2 P 0010 at 29 0/7 weeks gestation (Estimated  Date of Delivery: 24).     -Pregnancy  [ X ] stable  [   ] improving [  ] worsening    Diagnoses and all orders for this visit:    1. Diabetes mellitus complicating pregnancy, unspecified trimester (Primary)    2. Hypertension affecting pregnancy, antepartum    3. Advanced maternal age, primigravida, antepartum      ADVANCED MATERNAL AGE  NIPT wnl.    Recommend fetal growth surveillance. Start  fetal surveillance with weekly BPP at 32-36 weeks.  Recommend baby ASA, which she is taking.      CHRONIC HYPERTENSION  Amlodipine 10 mg   Previously counseled.   I recommended serial growth ultrasounds every 4 weeks  to ensure appropriate fetal growth. In addition,  fetal testing 1-2x weekly should be initiated around 32 weeks gestation.  I would recommend a 38 week delivery given CHTN that has previously required medication.  She may require closer to 37 depending on her control closer to delivery.        PREGESTATIONAL DM, TYPE 2  Previously counseled.  Last A1c 2024 6.5  EKG wnl May 2024  Reports normal optho appt in May 2024    She brought her logs and they are globally increased--no significant excursions so almost certainly due to increased placental insulin resistance.  She is now on insulin and went up today. Discussed hypoglycemia management.      CURRENT REGIMEN  Metformin 1000 mg PO BID with meals  Lantus 30/    We discussed diet recommendations, and goal pre-prandial values below 90 and 1-hour post-prandial values below 120 to optimize her pregnancy outcome as many studies have demonstrated that this is the normal range for pregnant women without diabetes.  We discussed meal timing and eating every 2-3 hours with 30g carbs at breakfast, 45 for lunch and dinner, and 10-15g snacks in between and at bedtime.  We also discussed insulin teaching and hypoglycemia management and to call with BG <60 or >200.      We also discussed preeclampsia risk reduction with baby aspirin, which she is  taking daily.  Fetal ECHO was normal.     Recommendations:  -ASA 81 mg--continue daily  -normal PIH labs, 24 hour urine  -Continue medications as directed  -Ultrasound for fetal growth every 4 weeks  - testing at 32 weeks gestation, twice weekly between M and primary OB  -Continue routine prenatal care with primary ob team   -At present goal for delivery is 38 weeks for CHTN and DM    Follow-up:  3 weeks growth, then weekly    Thank you for the consult and opportunity to care for this patient.  Please feel free to reach out with any questions or concerns.      I spent 30 minutes caring for this patient on this date of service. This time includes time spent by me in the following activities: preparing for the visit, reviewing tests, obtaining and/or reviewing a separately obtained history, performing a medically appropriate examination and/or evaluation, counseling and educating the patient/family/caregiver and independently interpreting results and communicating that information with the patient/family/caregiver with greater than 50% spent in counseling and coordination of care.     4 minutes reading US.    Tamiko Curry MD FACOG  Maternal Fetal Medicine-[facility]  Office: [phone]  [email or web address]

## 2024-09-12 NOTE — PROGRESS NOTES
MATERNAL FETAL MEDICINE Consult Note    Dear Dr Darius Uribe MD:    Thank you for your kind referral of Lavern Rehman.  As you know, she is a 39 y.o. G 2 P 0010 at 29 0/7 weeks gestation (Estimated Date of Delivery: 11/28/24). This is a consult.     Her antepartum course is complicated by:  Chronic Hypertension  Type 2 DM  AMA    Aneuploidy Screening: NIPT - Negative    HPI: Today, she denies headache, blurry vision, RUQ pain. No vaginal bleeding, no contractions.     Review of History:  Past Medical History:   Diagnosis Date    Adhesive capsulitis of left shoulder 09/08/2022    Formatting of this note might be different from the original.   Added automatically from request for surgery [id number]      Asthma 02/01/1991    Congenital cystic kidney disease 05/20/2024    Diabetes     TERE (generalized anxiety disorder)     GERD (gastroesophageal reflux disease)     HTN (hypertension)     Hypertension      Past Surgical History:   Procedure Laterality Date    DILATATION AND CURETTAGE      KNEE SURGERY      LAPAROSCOPIC CHOLECYSTECTOMY      SHOULDER SURGERY       Social History     Socioeconomic History    Marital status:    Tobacco Use    Smoking status: Never     Passive exposure: Never    Smokeless tobacco: Never   Vaping Use    Vaping status: Former   Substance and Sexual Activity    Alcohol use: Not Currently    Drug use: Not Currently    Sexual activity: Yes     Partners: Male     Birth control/protection: Birth control pill     Family History   Problem Relation Age of Onset    Hypertension Father     Breast cancer Maternal Grandmother     Hypertension Maternal Grandfather     Ovarian cancer Neg Hx     Uterine cancer Neg Hx     Colon cancer Neg Hx       Allergies   Allergen Reactions    Shellfish-Derived Products Hives     N/v.    Shellfish Allergy Hives    Oxycodone-Acetaminophen Hives, Itching and Rash      Current Outpatient Medications on File Prior to Visit   Medication Sig Dispense Refill    acarbose  (PRECOSE) 25 MG tablet Take 1 tablet by mouth Take As Directed. Take 25mg with lunch and dinner 60 tablet 6    amLODIPine (NORVASC) 10 MG tablet Take 1 tablet by mouth Daily.      aspirin 81 MG chewable tablet Chew 1 tablet Daily. 60 tablet 3    Blood Glucose Monitoring Suppl (ONE TOUCH ULTRA 2) w/Device kit       budesonide-formoterol (SYMBICORT) 80-4.5 MCG/ACT inhaler Inhale 2 puffs 2 (Two) Times a Day.      ferrous sulfate 325 (65 FE) MG tablet Take 1 tablet by mouth Daily.      glucose blood test strip Use to check blood sugar 7 times a day. Patient has a One Touch Ultra 2 glucometer. 200 each 10    glucose blood test strip Use as instructed 200 each 12    glucose monitor monitoring kit Use to check blood sugars 7 times daily, please dispense compatible lancets and test strips. 1 each 0    glyburide (DIAbeta) 2.5 MG tablet Take 1 tablet by mouth Take As Directed. Take 1.25mg with breakfast and 2.5mg at bedtime 60 tablet 8    Insulin Glargine (LANTUS SOLOSTAR) 100 UNIT/ML injection pen Please dispense insulin pen. Variable dosing. Starting dose: 8 units AM, 8 units PM. 30 mL 6    Insulin Pen Needle (Pen Needles) 32G X 4 MM misc Use 1 Needle 2 (Two) Times a Day. Use to inject insulin up to 5x daily. 200 each 3    ketoconazole (NIZORAL) 2 % cream prn      Lancets 30G misc To test glucose 7 times daily 200 each 6    metFORMIN ER (GLUCOPHAGE-XR) 500 MG 24 hr tablet Take 2 tablets by mouth Daily With Breakfast & Dinner. 120 tablet 8    OneTouch Delica Lancets 30G misc 1 Lancet by Other route Take As Directed. Use to check blood sugar 7 times a day. 200 each 10    pantoprazole (PROTONIX) 20 MG EC tablet Take 1 tablet by mouth Daily.      prenatal vitamins (PRENATAL 27-1) 27-1 MG tablet tablet Take  by mouth Daily.      PROVENTIL  (90 BASE) MCG/ACT inhaler INHALE 1 PUFF EVERY 4 HOURS AS NEEDED. 6.7 inhaler 0     No current facility-administered medications on file prior to visit.        Past obstetric,  gynecological, medical, surgical, family and social history reviewed.  Relevant lab work and imaging reviewed.    Review of systems  Constitutional:  denies fever, chills, malaise.   ENT/Mouth:  denies sore throat, tinnitus  Eyes: denies vision changes/pain  CV:  denies chest pain  Respiratory:  denies cough/SOB  GI:  denies N/V, diarrhea, abdominal pain.    :   denies dysuria  Skin:  denies lesions or pruritus   Neuro:  denies weakness, focal neurologic symptoms    Vitals:    24 1348 24 1352 24 1355   BP: 152/70 126/64 134/70   BP Location: Right arm Left arm Right arm   Patient Position: Sitting Sitting Sitting   Pulse: 103     Temp: 97.8 °F (36.6 °C)     TempSrc: Temporal     Weight: (!) 138 kg (303 lb 6.4 oz)             PHYSICAL EXAM   GENERAL: Not in acute distress, AAOx3, pleasant  CARDIO: regular rate and rhythm  PULM: symmetric chest rise, speaking in complete sentences without difficulty  NEURO: awake, alert and oriented to person, place, and time  ABDOMINAL: No fundal tenderness, no rebound or guarding, gravid  EXTREMITIES: no bilateral lower extremity edema/tenderness  SKIN: Warm, well-perfused      ULTRASOUND   Please view full ultrasound note on Imaging tab in ViewPoint.  Cephalic presentation.  Anterior placenta.  ALBA 23 cm, which is high normal.  EFW 1625 g (77%, AC 88%)  BPP   Follow up anatomy views appear normal.      ASSESSMENT/COUNSELIN y.o. G 2 P 0010 at 29 0/7 weeks gestation (Estimated Date of Delivery: 24).     -Pregnancy  [ X ] stable  [   ] improving [  ] worsening    Diagnoses and all orders for this visit:    1. Diabetes mellitus complicating pregnancy, unspecified trimester (Primary)    2. Hypertension affecting pregnancy, antepartum    3. Advanced maternal age, primigravida, antepartum      ADVANCED MATERNAL AGE  NIPT wnl.    Recommend fetal growth surveillance. Start  fetal surveillance with weekly BPP at 32-36 weeks.  Recommend baby ASA,  which she is taking.      CHRONIC HYPERTENSION  Amlodipine 10 mg   Previously counseled.   I recommended serial growth ultrasounds every 4 weeks  to ensure appropriate fetal growth. In addition,  fetal testing 1-2x weekly should be initiated around 32 weeks gestation.  I would recommend a 38 week delivery given CHTN that has previously required medication.  She may require closer to 37 depending on her control closer to delivery.        PREGESTATIONAL DM, TYPE 2  Previously counseled.  Last A1c 2024 6.5  EKG wnl May 2024  Reports normal optho appt in May 2024    She brought her logs and they are globally increased--no significant excursions so almost certainly due to increased placental insulin resistance.  She is now on insulin and went up today. Discussed hypoglycemia management.      CURRENT REGIMEN  Metformin 1000 mg PO BID with meals  Lantus     We discussed diet recommendations, and goal pre-prandial values below 90 and 1-hour post-prandial values below 120 to optimize her pregnancy outcome as many studies have demonstrated that this is the normal range for pregnant women without diabetes.  We discussed meal timing and eating every 2-3 hours with 30g carbs at breakfast, 45 for lunch and dinner, and 10-15g snacks in between and at bedtime.  We also discussed insulin teaching and hypoglycemia management and to call with BG <60 or >200.      We also discussed preeclampsia risk reduction with baby aspirin, which she is taking daily.  Fetal ECHO was normal.     Recommendations:  -ASA 81 mg--continue daily  -normal PIH labs, 24 hour urine  -Continue medications as directed  -Ultrasound for fetal growth every 4 weeks  - testing at 32 weeks gestation, twice weekly between M and primary OB  -Continue routine prenatal care with primary ob team   -At present goal for delivery is 38 weeks for CHTN and DM    Follow-up:  3 weeks growth, then weekly    Thank you for the consult and opportunity to  care for this patient.  Please feel free to reach out with any questions or concerns.      I spent 30 minutes caring for this patient on this date of service. This time includes time spent by me in the following activities: preparing for the visit, reviewing tests, obtaining and/or reviewing a separately obtained history, performing a medically appropriate examination and/or evaluation, counseling and educating the patient/family/caregiver and independently interpreting results and communicating that information with the patient/family/caregiver with greater than 50% spent in counseling and coordination of care.     4 minutes reading US.    Tamiko Curry MD FACOG  Maternal Fetal Medicine-[facility]  Office: [phone]  [email or web address]

## 2024-09-17 ENCOUNTER — DOCUMENTATION (OUTPATIENT)
Dept: OBSTETRICS AND GYNECOLOGY | Facility: CLINIC | Age: 39
End: 2024-09-17
Payer: COMMERCIAL

## 2024-09-20 ENCOUNTER — DOCUMENTATION (OUTPATIENT)
Dept: OBSTETRICS AND GYNECOLOGY | Facility: CLINIC | Age: 39
End: 2024-09-20
Payer: COMMERCIAL

## 2024-09-20 ENCOUNTER — ROUTINE PRENATAL (OUTPATIENT)
Dept: OBSTETRICS AND GYNECOLOGY | Facility: CLINIC | Age: 39
End: 2024-09-20
Payer: COMMERCIAL

## 2024-09-20 VITALS — WEIGHT: 293 LBS | SYSTOLIC BLOOD PRESSURE: 134 MMHG | BODY MASS INDEX: 46.07 KG/M2 | DIASTOLIC BLOOD PRESSURE: 81 MMHG

## 2024-09-20 DIAGNOSIS — Z34.93 THIRD TRIMESTER PREGNANCY: Primary | ICD-10-CM

## 2024-09-20 DIAGNOSIS — Z3A.30 30 WEEKS GESTATION OF PREGNANCY: ICD-10-CM

## 2024-09-20 LAB
GLUCOSE UR STRIP-MCNC: NEGATIVE MG/DL
PROT UR STRIP-MCNC: NEGATIVE MG/DL

## 2024-09-20 PROCEDURE — 0502F SUBSEQUENT PRENATAL CARE: CPT

## 2024-09-23 ENCOUNTER — TRANSCRIBE ORDERS (OUTPATIENT)
Dept: ULTRASOUND IMAGING | Facility: HOSPITAL | Age: 39
End: 2024-09-23
Payer: COMMERCIAL

## 2024-09-23 DIAGNOSIS — O16.9 HYPERTENSION AFFECTING PREGNANCY, ANTEPARTUM: ICD-10-CM

## 2024-09-23 DIAGNOSIS — O24.919 DIABETES MELLITUS COMPLICATING PREGNANCY, UNSPECIFIED TRIMESTER: Primary | ICD-10-CM

## 2024-09-23 DIAGNOSIS — O09.529 AMA (ADVANCED MATERNAL AGE) MULTIGRAVIDA 35+, UNSPECIFIED TRIMESTER: ICD-10-CM

## 2024-09-27 ENCOUNTER — DOCUMENTATION (OUTPATIENT)
Dept: OBSTETRICS AND GYNECOLOGY | Facility: CLINIC | Age: 39
End: 2024-09-27
Payer: COMMERCIAL

## 2024-10-01 ENCOUNTER — ROUTINE PRENATAL (OUTPATIENT)
Dept: OBSTETRICS AND GYNECOLOGY | Facility: CLINIC | Age: 39
End: 2024-10-01
Payer: COMMERCIAL

## 2024-10-01 VITALS — WEIGHT: 293 LBS | BODY MASS INDEX: 48.06 KG/M2 | SYSTOLIC BLOOD PRESSURE: 150 MMHG | DIASTOLIC BLOOD PRESSURE: 85 MMHG

## 2024-10-01 DIAGNOSIS — O24.919 DIABETES MELLITUS COMPLICATING PREGNANCY, UNSPECIFIED TRIMESTER: ICD-10-CM

## 2024-10-01 DIAGNOSIS — O99.210 MATERNAL MORBID OBESITY, ANTEPARTUM: ICD-10-CM

## 2024-10-01 DIAGNOSIS — O16.9 HYPERTENSION AFFECTING PREGNANCY, ANTEPARTUM: ICD-10-CM

## 2024-10-01 DIAGNOSIS — Z3A.31 31 WEEKS GESTATION OF PREGNANCY: Primary | ICD-10-CM

## 2024-10-01 DIAGNOSIS — Z34.83 PRENATAL CARE, SUBSEQUENT PREGNANCY IN THIRD TRIMESTER: ICD-10-CM

## 2024-10-01 DIAGNOSIS — O09.519 ADVANCED MATERNAL AGE, PRIMIGRAVIDA, ANTEPARTUM: ICD-10-CM

## 2024-10-01 DIAGNOSIS — E66.01 MATERNAL MORBID OBESITY, ANTEPARTUM: ICD-10-CM

## 2024-10-01 LAB
GLUCOSE UR STRIP-MCNC: NEGATIVE MG/DL
PROT UR STRIP-MCNC: NEGATIVE MG/DL

## 2024-10-01 NOTE — PROGRESS NOTES
OB follow up     Lavern Rehman is a 39 y.o.  31w5d being seen today for her obstetrical visit.  Patient reports no complaints. Fetal movement: normal.  Biophysical profile was     Her prenatal care is complicated by (and status): Diabetes, hypertension, advanced maternal age, obesity    Review of Systems  Cramping/contractions : Negative  Vaginal bleeding: Negative  Fetal movement is normal    /85   Wt (!) 143 kg (316 lb 1.6 oz)   LMP 2024 (Exact Date)   BMI 48.06 kg/m²     FHT: 139 BPM   Uterine Size: size equals dates       Assessment    Diagnoses and all orders for this visit:    1. 31 weeks gestation of pregnancy (Primary)  -     POC Urinalysis Dipstick    2. Prenatal care, subsequent pregnancy in third trimester    3. Diabetes mellitus complicating pregnancy, unspecified trimester    4. Hypertension affecting pregnancy, antepartum    5. Advanced maternal age, primigravida, antepartum    6. Maternal morbid obesity, antepartum        1) pregnancy at 31w5d         Plan    Reviewed this stage of pregnancy  Problem list updated   Follow up in 1 week.  Will now be getting a biophysical profile here every Tuesday and with maternal-fetal medicine every Thursday.    Darius Uribe MD   10/1/2024  15:40 EDT

## 2024-10-03 ENCOUNTER — HOSPITAL ENCOUNTER (OUTPATIENT)
Dept: ULTRASOUND IMAGING | Facility: HOSPITAL | Age: 39
Discharge: HOME | End: 2024-10-03
Admitting: INTERNAL MEDICINE
Payer: COMMERCIAL

## 2024-10-03 ENCOUNTER — OFFICE VISIT (OUTPATIENT)
Dept: OBSTETRICS AND GYNECOLOGY | Facility: CLINIC | Age: 39
End: 2024-10-03
Payer: COMMERCIAL

## 2024-10-03 VITALS
HEART RATE: 98 BPM | SYSTOLIC BLOOD PRESSURE: 124 MMHG | DIASTOLIC BLOOD PRESSURE: 68 MMHG | BODY MASS INDEX: 47.93 KG/M2 | WEIGHT: 293 LBS | TEMPERATURE: 97.8 F

## 2024-10-03 DIAGNOSIS — O16.9 HYPERTENSION AFFECTING PREGNANCY, ANTEPARTUM: ICD-10-CM

## 2024-10-03 DIAGNOSIS — E66.01 MATERNAL MORBID OBESITY, ANTEPARTUM: ICD-10-CM

## 2024-10-03 DIAGNOSIS — O24.919 DIABETES MELLITUS COMPLICATING PREGNANCY, UNSPECIFIED TRIMESTER: ICD-10-CM

## 2024-10-03 DIAGNOSIS — O09.519 ADVANCED MATERNAL AGE, PRIMIGRAVIDA, ANTEPARTUM: Primary | ICD-10-CM

## 2024-10-03 DIAGNOSIS — O99.210 MATERNAL MORBID OBESITY, ANTEPARTUM: ICD-10-CM

## 2024-10-03 LAB — GLUCOSE BLDC GLUCOMTR-MCNC: 84 MG/DL (ref 70–130)

## 2024-10-03 PROCEDURE — 76819 FETAL BIOPHYS PROFIL W/O NST: CPT

## 2024-10-03 PROCEDURE — 82948 REAGENT STRIP/BLOOD GLUCOSE: CPT

## 2024-10-03 RX ORDER — ONDANSETRON 4 MG/1
4 TABLET, ORALLY DISINTEGRATING ORAL EVERY 6 HOURS PRN
Status: SHIPPED | OUTPATIENT
Start: 2024-10-03

## 2024-10-03 NOTE — PROGRESS NOTES
MATERNAL FETAL MEDICINE Consult Note    Dear Dr Darius Uribe MD:    Thank you for your kind referral of Lavern Rehman.  As you know, she is a 39 y.o. G 2 P 0010 at 32 0/7 weeks gestation (Estimated Date of Delivery: 11/28/24). This is a consult.     Her antepartum course is complicated by:  Chronic Hypertension  Type 2 DM  AMA    Aneuploidy Screening: NIPT - Negative    HPI: Today, she denies headache, blurry vision, RUQ pain. No vaginal bleeding, no contractions.     Review of History:  Past Medical History:   Diagnosis Date    Adhesive capsulitis of left shoulder 09/08/2022    Formatting of this note might be different from the original.   Added automatically from request for surgery [id number]      Asthma 02/01/1991    Congenital cystic kidney disease 05/20/2024    Diabetes     TERE (generalized anxiety disorder)     GERD (gastroesophageal reflux disease)     HTN (hypertension)     Hypertension      Past Surgical History:   Procedure Laterality Date    DILATATION AND CURETTAGE      KNEE SURGERY      LAPAROSCOPIC CHOLECYSTECTOMY      SHOULDER SURGERY       Social History     Socioeconomic History    Marital status:    Tobacco Use    Smoking status: Never     Passive exposure: Never    Smokeless tobacco: Never   Vaping Use    Vaping status: Former   Substance and Sexual Activity    Alcohol use: Not Currently    Drug use: Not Currently    Sexual activity: Yes     Partners: Male     Birth control/protection: Birth control pill     Family History   Problem Relation Age of Onset    Hypertension Father     Breast cancer Maternal Grandmother     Hypertension Maternal Grandfather     Ovarian cancer Neg Hx     Uterine cancer Neg Hx     Colon cancer Neg Hx       Allergies   Allergen Reactions    Shellfish-Derived Products Hives     N/v.    Shellfish Allergy Hives    Oxycodone-Acetaminophen Hives, Itching and Rash      Current Outpatient Medications on File Prior to Visit   Medication Sig Dispense Refill     amLODIPine (NORVASC) 10 MG tablet Take 1 tablet by mouth Daily.      aspirin 81 MG chewable tablet Chew 1 tablet Daily. 60 tablet 3    Blood Glucose Monitoring Suppl (ONE TOUCH ULTRA 2) w/Device kit       budesonide-formoterol (SYMBICORT) 80-4.5 MCG/ACT inhaler Inhale 2 puffs 2 (Two) Times a Day.      ferrous sulfate 325 (65 FE) MG tablet Take 1 tablet by mouth Daily.      glucose blood test strip Use to check blood sugar 7 times a day. Patient has a One Touch Ultra 2 glucometer. 200 each 10    glucose blood test strip Use as instructed 200 each 12    glucose monitor monitoring kit Use to check blood sugars 7 times daily, please dispense compatible lancets and test strips. 1 each 0    Insulin Glargine (LANTUS SOLOSTAR) 100 UNIT/ML injection pen Please dispense insulin pen. Variable dosing up to 100 units a day. Starting dose: 35 units AM, 35 units PM. PLEASE PROCESS AS THERAPY CHANGE. 30 mL 6    Insulin Pen Needle (Pen Needles) 32G X 4 MM misc Use 1 Needle 2 (Two) Times a Day. Use to inject insulin up to 5x daily. 200 each 3    Lancets 30G misc To test glucose 7 times daily 200 each 6    metFORMIN ER (GLUCOPHAGE-XR) 500 MG 24 hr tablet Take 2 tablets by mouth Daily With Breakfast & Dinner. 120 tablet 8    OneTouch Delica Lancets 30G misc 1 Lancet by Other route Take As Directed. Use to check blood sugar 7 times a day. 200 each 10    pantoprazole (PROTONIX) 20 MG EC tablet Take 1 tablet by mouth Daily.      prenatal vitamins (PRENATAL 27-1) 27-1 MG tablet tablet Take  by mouth Daily.      PROVENTIL  (90 BASE) MCG/ACT inhaler INHALE 1 PUFF EVERY 4 HOURS AS NEEDED. 6.7 inhaler 0    acarbose (PRECOSE) 25 MG tablet Take 1 tablet by mouth Take As Directed. Take 25mg with lunch and dinner 60 tablet 6    glyburide (DIAbeta) 2.5 MG tablet Take 1 tablet by mouth Take As Directed. Take 1.25mg with breakfast and 2.5mg at bedtime 60 tablet 8    ketoconazole (NIZORAL) 2 % cream prn       No current facility-administered  medications on file prior to visit.        Past obstetric, gynecological, medical, surgical, family and social history reviewed.  Relevant lab work and imaging reviewed.    Review of systems  Constitutional:  denies fever, chills, malaise.   ENT/Mouth:  denies sore throat, tinnitus  Eyes: denies vision changes/pain  CV:  denies chest pain  Respiratory:  denies cough/SOB  GI:  denies N/V, diarrhea, abdominal pain.    :   denies dysuria  Skin:  denies lesions or pruritus   Neuro:  denies weakness, focal neurologic symptoms    Vitals:    10/03/24 1501   BP: 124/68   BP Location: Left arm   Patient Position: Sitting   Pulse: 98   Temp: 97.8 °F (36.6 °C)   TempSrc: Temporal   Weight: (!) 143 kg (315 lb 3.2 oz)       PHYSICAL EXAM   GENERAL: Not in acute distress, AAOx3, pleasant  CARDIO: regular rate and rhythm  PULM: symmetric chest rise, speaking in complete sentences without difficulty  NEURO: awake, alert and oriented to person, place, and time  ABDOMINAL: No fundal tenderness, no rebound or guarding, gravid  EXTREMITIES: no bilateral lower extremity edema/tenderness  SKIN: Warm, well-perfused      ULTRASOUND   Please view full ultrasound note on Imaging tab in ViewPoint.  Cephalic presentation  Anterior placenta  ALBA 23 cm, which is normal  BPP 6/8 (-2 for breathing)  8/10 with cat 1 NST    NST  DATE: October 3, 2024  PROCEDURE: nonstress test  INTERPRETING PROVIDER: CELINE Lew  GESTATIONAL AGE: 32w0d gestation  DIAGNOSIS: AMA, CHTN, DM, Obesity  INDICATION: BPP 6/8 (-2 for breathing)  START TIME: 15:57  END TIME: 16:25  FINDINGS: NST: 130 bpm, reactive, moderate variability (amplitude 6-25 bpm), present > 32 weeks - 15x15 bpm,  no decelerations .   TOCOMETRY: quiet  *Patient was monitored for a minimum of 20 minutes.      ASSESSMENT/COUNSELIN y.o. G 2 P 0010 at 32 0/7 weeks gestation (Estimated Date of Delivery: 24).     -Pregnancy  [ X ] stable  [   ] improving [  ] worsening    Diagnoses  and all orders for this visit:    1. Advanced maternal age, primigravida, antepartum (Primary)    2. Hypertension affecting pregnancy, antepartum    3. Maternal morbid obesity, antepartum    4. Diabetes mellitus complicating pregnancy, unspecified trimester    Other orders  -     ondansetron ODT (ZOFRAN-ODT) disintegrating tablet 4 mg        ADVANCED MATERNAL AGE  NIPT wnl.    Recommend fetal growth surveillance. Start  fetal surveillance with weekly BPP at 32-36 weeks.  Recommend baby ASA, which she is taking.      CHRONIC HYPERTENSION  Amlodipine 10 mg   Previously counseled.   I recommended serial growth ultrasounds every 4 weeks  to ensure appropriate fetal growth. In addition,  fetal testing 1-2x weekly should be initiated around 32 weeks gestation.  I would recommend a 38 week delivery given CHTN that has previously required medication.  She may require closer to 37 depending on her control closer to delivery.        PREGESTATIONAL DM, TYPE 2  Previously counseled.  Last A1c 2024 6.5  EKG wnl May 2024  Reports normal optho appt in May 2024    Blood sugars reviewed. Much improved. Fasting and pre meals slightly elevated so will increase lantus.      CURRENT REGIMEN  Metformin 1000 mg PO BID with meals  Lantus 44/44    We discussed diet recommendations, and goal pre-prandial values below 90 and 1-hour post-prandial values below 120 to optimize her pregnancy outcome as many studies have demonstrated that this is the normal range for pregnant women without diabetes.  We discussed meal timing and eating every 2-3 hours with 30g carbs at breakfast, 45 for lunch and dinner, and 10-15g snacks in between and at bedtime.  We also discussed insulin teaching and hypoglycemia management and to call with BG <60 or >200.      We also discussed preeclampsia risk reduction with baby aspirin, which she is taking daily.  Fetal ECHO was normal.     Recommendations:  -ASA 81 mg--continue daily  -normal PIH  labs, 24 hour urine  -Continue medications as directed  -Ultrasound for fetal growth every 4 weeks  - testing at 32 weeks gestation, twice weekly between MFM and primary OB  -Continue routine prenatal care with primary ob team   -At present goal for delivery is 38 weeks for CHTN and DM    Follow-up:  weekly    Thank you for the consult and opportunity to care for this patient.  Please feel free to reach out with any questions or concerns.      I spent 30 minutes caring for this patient on this date of service. This time includes time spent by me in the following activities: preparing for the visit, reviewing tests, obtaining and/or reviewing a separately obtained history, performing a medically appropriate examination and/or evaluation, counseling and educating the patient/family/caregiver and independently interpreting results and communicating that information with the patient/family/caregiver with greater than 50% spent in counseling and coordination of care.       CELINE Lew  Maternal Fetal Medicine-[facility]  Office: [phone]  [email or web address]

## 2024-10-03 NOTE — LETTER
October 3, 2024     Darius Uribe MD  [address]  [facility] [address]  [place] [id number]    Patient: Lavern Rehman   YOB: 1985   Date of Visit: 10/3/2024       Dear Darius Uribe MD    Lavern Rehman was in my office today. Below is a copy of my note.    If you have questions, please do not hesitate to call me. I look forward to following Lavern along with you.         Sincerely,        CELINE Guzmán        CC: No Recipients                          Pt reports that she is doing well and denies vaginal bleeding, cramping, contractions or LOF at this time. Notes light cramping today, denies consistency. Reports active fetal movement. Reviewed when to call OB office or present to L&D for evaluation with symptoms such as decreased fetal movement, vaginal bleeding, LOF or ctxs. Pt verbalized understanding. Denies HA, visual changes or epigastric pain. Denies any additional complaints at time of appointment. Next OB appointment scheduled for 10/8.    Blood sugar logs on chart. Blood sugar taken at time of appointment with a value of 84 noted. Patient notes an increase in blood sugars throughout the day. Notes a possible connection to the gingerale she drinks to help with nausea. Orders placed for Zofran per [facility] at this time.     Vitals:    10/03/24 1501   BP: 124/68   Pulse: 98   Temp: 97.8 °F (36.6 °C)          MATERNAL FETAL MEDICINE Consult Note    Dear Dr Darius Uribe MD:    Thank you for your kind referral of Lavern Rehman.  As you know, she is a 39 y.o. G 2 P 0010 at 32 0/7 weeks gestation (Estimated Date of Delivery: 11/28/24). This is a consult.     Her antepartum course is complicated by:  Chronic Hypertension  Type 2 DM  AMA    Aneuploidy Screening: NIPT - Negative    HPI: Today, she denies headache, blurry vision, RUQ pain. No vaginal bleeding, no contractions.     Review of History:  Past Medical History:   Diagnosis Date   • Adhesive capsulitis of left shoulder  09/08/2022    Formatting of this note might be different from the original.   Added automatically from request for surgery [id number]     • Asthma 02/01/1991   • Congenital cystic kidney disease 05/20/2024   • Diabetes    • TERE (generalized anxiety disorder)    • GERD (gastroesophageal reflux disease)    • HTN (hypertension)    • Hypertension      Past Surgical History:   Procedure Laterality Date   • DILATATION AND CURETTAGE     • KNEE SURGERY     • LAPAROSCOPIC CHOLECYSTECTOMY     • SHOULDER SURGERY       Social History     Socioeconomic History   • Marital status:    Tobacco Use   • Smoking status: Never     Passive exposure: Never   • Smokeless tobacco: Never   Vaping Use   • Vaping status: Former   Substance and Sexual Activity   • Alcohol use: Not Currently   • Drug use: Not Currently   • Sexual activity: Yes     Partners: Male     Birth control/protection: Birth control pill     Family History   Problem Relation Age of Onset   • Hypertension Father    • Breast cancer Maternal Grandmother    • Hypertension Maternal Grandfather    • Ovarian cancer Neg Hx    • Uterine cancer Neg Hx    • Colon cancer Neg Hx       Allergies   Allergen Reactions   • Shellfish-Derived Products Hives     N/v.   • Shellfish Allergy Hives   • Oxycodone-Acetaminophen Hives, Itching and Rash      Current Outpatient Medications on File Prior to Visit   Medication Sig Dispense Refill   • amLODIPine (NORVASC) 10 MG tablet Take 1 tablet by mouth Daily.     • aspirin 81 MG chewable tablet Chew 1 tablet Daily. 60 tablet 3   • Blood Glucose Monitoring Suppl (ONE TOUCH ULTRA 2) w/Device kit      • budesonide-formoterol (SYMBICORT) 80-4.5 MCG/ACT inhaler Inhale 2 puffs 2 (Two) Times a Day.     • ferrous sulfate 325 (65 FE) MG tablet Take 1 tablet by mouth Daily.     • glucose blood test strip Use to check blood sugar 7 times a day. Patient has a One Touch Ultra 2 glucometer. 200 each 10   • glucose blood test strip Use as instructed 200 each  12   • glucose monitor monitoring kit Use to check blood sugars 7 times daily, please dispense compatible lancets and test strips. 1 each 0   • Insulin Glargine (LANTUS SOLOSTAR) 100 UNIT/ML injection pen Please dispense insulin pen. Variable dosing up to 100 units a day. Starting dose: 35 units AM, 35 units PM. PLEASE PROCESS AS THERAPY CHANGE. 30 mL 6   • Insulin Pen Needle (Pen Needles) 32G X 4 MM misc Use 1 Needle 2 (Two) Times a Day. Use to inject insulin up to 5x daily. 200 each 3   • Lancets 30G misc To test glucose 7 times daily 200 each 6   • metFORMIN ER (GLUCOPHAGE-XR) 500 MG 24 hr tablet Take 2 tablets by mouth Daily With Breakfast & Dinner. 120 tablet 8   • OneTouch Delica Lancets 30G misc 1 Lancet by Other route Take As Directed. Use to check blood sugar 7 times a day. 200 each 10   • pantoprazole (PROTONIX) 20 MG EC tablet Take 1 tablet by mouth Daily.     • prenatal vitamins (PRENATAL 27-1) 27-1 MG tablet tablet Take  by mouth Daily.     • PROVENTIL  (90 BASE) MCG/ACT inhaler INHALE 1 PUFF EVERY 4 HOURS AS NEEDED. 6.7 inhaler 0   • acarbose (PRECOSE) 25 MG tablet Take 1 tablet by mouth Take As Directed. Take 25mg with lunch and dinner 60 tablet 6   • glyburide (DIAbeta) 2.5 MG tablet Take 1 tablet by mouth Take As Directed. Take 1.25mg with breakfast and 2.5mg at bedtime 60 tablet 8   • ketoconazole (NIZORAL) 2 % cream prn       No current facility-administered medications on file prior to visit.        Past obstetric, gynecological, medical, surgical, family and social history reviewed.  Relevant lab work and imaging reviewed.    Review of systems  Constitutional:  denies fever, chills, malaise.   ENT/Mouth:  denies sore throat, tinnitus  Eyes: denies vision changes/pain  CV:  denies chest pain  Respiratory:  denies cough/SOB  GI:  denies N/V, diarrhea, abdominal pain.    :   denies dysuria  Skin:  denies lesions or pruritus   Neuro:  denies weakness, focal neurologic symptoms    Vitals:     10/03/24 1501   BP: 124/68   BP Location: Left arm   Patient Position: Sitting   Pulse: 98   Temp: 97.8 °F (36.6 °C)   TempSrc: Temporal   Weight: (!) 143 kg (315 lb 3.2 oz)       PHYSICAL EXAM   GENERAL: Not in acute distress, AAOx3, pleasant  CARDIO: regular rate and rhythm  PULM: symmetric chest rise, speaking in complete sentences without difficulty  NEURO: awake, alert and oriented to person, place, and time  ABDOMINAL: No fundal tenderness, no rebound or guarding, gravid  EXTREMITIES: no bilateral lower extremity edema/tenderness  SKIN: Warm, well-perfused      ULTRASOUND   Please view full ultrasound note on Imaging tab in ViewPoint.  Cephalic presentation  Anterior placenta  ALBA 23 cm, which is normal  BPP 6/8 (-2 for breathing)  8/10 with cat 1 NST    NST  DATE: October 3, 2024  PROCEDURE: nonstress test  INTERPRETING PROVIDER: CELINE Lew  GESTATIONAL AGE: 32w0d gestation  DIAGNOSIS: AMA, CHTN, DM, Obesity  INDICATION: BPP 6/8 (-2 for breathing)  START TIME: 15:57  END TIME: 16:25  FINDINGS: NST: 130 bpm, reactive, moderate variability (amplitude 6-25 bpm), present > 32 weeks - 15x15 bpm,  no decelerations .   TOCOMETRY: quiet  *Patient was monitored for a minimum of 20 minutes.      ASSESSMENT/COUNSELIN y.o. G 2 P 0010 at 32 0/7 weeks gestation (Estimated Date of Delivery: 24).     -Pregnancy  [ X ] stable  [   ] improving [  ] worsening    Diagnoses and all orders for this visit:    1. Advanced maternal age, primigravida, antepartum (Primary)    2. Hypertension affecting pregnancy, antepartum    3. Maternal morbid obesity, antepartum    4. Diabetes mellitus complicating pregnancy, unspecified trimester    Other orders  -     ondansetron ODT (ZOFRAN-ODT) disintegrating tablet 4 mg        ADVANCED MATERNAL AGE  NIPT wnl.    Recommend fetal growth surveillance. Start  fetal surveillance with weekly BPP at 32-36 weeks.  Recommend baby ASA, which she is taking.      CHRONIC  HYPERTENSION  Amlodipine 10 mg   Previously counseled.   I recommended serial growth ultrasounds every 4 weeks  to ensure appropriate fetal growth. In addition,  fetal testing 1-2x weekly should be initiated around 32 weeks gestation.  I would recommend a 38 week delivery given CHTN that has previously required medication.  She may require closer to 37 depending on her control closer to delivery.        PREGESTATIONAL DM, TYPE 2  Previously counseled.  Last A1c 2024 6.5  EKG wnl May 2024  Reports normal optho appt in May 2024    Blood sugars reviewed. Much improved. Fasting and pre meals slightly elevated so will increase lantus.      CURRENT REGIMEN  Metformin 1000 mg PO BID with meals  Lantus 44/44    We discussed diet recommendations, and goal pre-prandial values below 90 and 1-hour post-prandial values below 120 to optimize her pregnancy outcome as many studies have demonstrated that this is the normal range for pregnant women without diabetes.  We discussed meal timing and eating every 2-3 hours with 30g carbs at breakfast, 45 for lunch and dinner, and 10-15g snacks in between and at bedtime.  We also discussed insulin teaching and hypoglycemia management and to call with BG <60 or >200.      We also discussed preeclampsia risk reduction with baby aspirin, which she is taking daily.  Fetal ECHO was normal.     Recommendations:  -ASA 81 mg--continue daily  -normal PIH labs, 24 hour urine  -Continue medications as directed  -Ultrasound for fetal growth every 4 weeks  - testing at 32 weeks gestation, twice weekly between M and primary OB  -Continue routine prenatal care with primary ob team   -At present goal for delivery is 38 weeks for CHTN and DM    Follow-up:  weekly    Thank you for the consult and opportunity to care for this patient.  Please feel free to reach out with any questions or concerns.      I spent 30 minutes caring for this patient on this date of service. This time includes  time spent by me in the following activities: preparing for the visit, reviewing tests, obtaining and/or reviewing a separately obtained history, performing a medically appropriate examination and/or evaluation, counseling and educating the patient/family/caregiver and independently interpreting results and communicating that information with the patient/family/caregiver with greater than 50% spent in counseling and coordination of care.       CELINE Lew  Maternal Fetal Medicine-[facility]  Office: [phone]  [email or web address]

## 2024-10-03 NOTE — NON STRESS TEST
Reason for test: Other (Comment) (BPP 6/8- breathing)  Date of Test: 10/3/2024  Time frame of test: 8661-3122  RN NST Interpretation: Reactive     Accels, no decels  Moderate variability    L tilt 1557

## 2024-10-03 NOTE — PROGRESS NOTES
Pt reports that she is doing well and denies vaginal bleeding, cramping, contractions or LOF at this time. Notes light cramping today, denies consistency. Reports active fetal movement. Reviewed when to call OB office or present to L&D for evaluation with symptoms such as decreased fetal movement, vaginal bleeding, LOF or ctxs. Pt verbalized understanding. Denies HA, visual changes or epigastric pain. Denies any additional complaints at time of appointment. Next OB appointment scheduled for 10/8.    Blood sugar logs on chart. Blood sugar taken at time of appointment with a value of 84 noted. Patient notes an increase in blood sugars throughout the day. Notes a possible connection to the gingerale she drinks to help with nausea. Orders placed for Zofran per [facility] at this time.     Vitals:    10/03/24 1501   BP: 124/68   Pulse: 98   Temp: 97.8 °F (36.6 °C)

## 2024-10-08 ENCOUNTER — ROUTINE PRENATAL (OUTPATIENT)
Dept: OBSTETRICS AND GYNECOLOGY | Facility: CLINIC | Age: 39
End: 2024-10-08
Payer: COMMERCIAL

## 2024-10-08 VITALS — BODY MASS INDEX: 48.66 KG/M2 | SYSTOLIC BLOOD PRESSURE: 140 MMHG | DIASTOLIC BLOOD PRESSURE: 81 MMHG | WEIGHT: 293 LBS

## 2024-10-08 DIAGNOSIS — E66.01 MATERNAL MORBID OBESITY, ANTEPARTUM: ICD-10-CM

## 2024-10-08 DIAGNOSIS — O24.919 DIABETES MELLITUS COMPLICATING PREGNANCY, UNSPECIFIED TRIMESTER: ICD-10-CM

## 2024-10-08 DIAGNOSIS — O99.210 MATERNAL MORBID OBESITY, ANTEPARTUM: ICD-10-CM

## 2024-10-08 DIAGNOSIS — Z3A.32 32 WEEKS GESTATION OF PREGNANCY: Primary | ICD-10-CM

## 2024-10-08 DIAGNOSIS — O09.519 ADVANCED MATERNAL AGE, PRIMIGRAVIDA, ANTEPARTUM: ICD-10-CM

## 2024-10-08 DIAGNOSIS — Z34.83 PRENATAL CARE, SUBSEQUENT PREGNANCY IN THIRD TRIMESTER: ICD-10-CM

## 2024-10-08 DIAGNOSIS — O16.9 HYPERTENSION AFFECTING PREGNANCY, ANTEPARTUM: ICD-10-CM

## 2024-10-08 LAB
GLUCOSE UR STRIP-MCNC: NEGATIVE MG/DL
PROT UR STRIP-MCNC: NEGATIVE MG/DL

## 2024-10-08 NOTE — PROGRESS NOTES
OB follow up     Lavern Rehman is a 39 y.o.  32w5d being seen today for her obstetrical visit.  Patient reports no complaints. Fetal movement: normal.  BPP today was 8/8.  She  has had very good glucose control and reports normal blood pressures at home and denies any headache or blurred vision.    Her prenatal care is complicated by (and status): Advanced maternal age, type 2 diabetes, chronic hypertension, obesity in pregnancy    Review of Systems  Cramping/contractions : None negative  Vaginal bleeding: Negative  Fetal movement normal    /81   Wt (!) 145 kg (320 lb)   LMP 2024 (Exact Date)   BMI 48.66 kg/m²     FHT: 153 BPM   Uterine Size: size equals dates       Assessment    1Diagnoses and all orders for this visit:    1. 32 weeks gestation of pregnancy (Primary)  -     POC Urinalysis Dipstick  -     ABRYSVO RSV Vaccine (pregnant women, 32-36 wks)  -     Fluzone >6mos  -     Tdap Vaccine Greater Than or Equal To 8yo IM    2. Prenatal care, subsequent pregnancy in third trimester    3. Diabetes mellitus complicating pregnancy, unspecified trimester    4. Hypertension affecting pregnancy, antepartum    5. Advanced maternal age, primigravida, antepartum    6. Maternal morbid obesity, antepartum        1) pregnancy at 32w5d         Plan    Reviewed this stage of pregnancy  Problem list updated   Follow up in 1 weeks.  She received her flu vaccine as well as Tdap and RSV vaccines today.  Will continue twice-weekly  surveillance between our office and maternal-fetal medicine.    Darius Uribe MD   10/8/2024  15:19 EDT

## 2024-10-10 ENCOUNTER — OFFICE VISIT (OUTPATIENT)
Dept: OBSTETRICS AND GYNECOLOGY | Facility: CLINIC | Age: 39
End: 2024-10-10
Payer: COMMERCIAL

## 2024-10-10 ENCOUNTER — HOSPITAL ENCOUNTER (OUTPATIENT)
Dept: ULTRASOUND IMAGING | Facility: HOSPITAL | Age: 39
Discharge: HOME | End: 2024-10-10
Admitting: OBSTETRICS & GYNECOLOGY
Payer: COMMERCIAL

## 2024-10-10 VITALS
OXYGEN SATURATION: 98 % | SYSTOLIC BLOOD PRESSURE: 134 MMHG | TEMPERATURE: 98.1 F | BODY MASS INDEX: 44.41 KG/M2 | WEIGHT: 293 LBS | DIASTOLIC BLOOD PRESSURE: 74 MMHG | HEART RATE: 103 BPM | HEIGHT: 68 IN

## 2024-10-10 DIAGNOSIS — O09.519 ADVANCED MATERNAL AGE, PRIMIGRAVIDA, ANTEPARTUM: ICD-10-CM

## 2024-10-10 DIAGNOSIS — O24.919 DIABETES MELLITUS COMPLICATING PREGNANCY, UNSPECIFIED TRIMESTER: ICD-10-CM

## 2024-10-10 DIAGNOSIS — O09.529 AMA (ADVANCED MATERNAL AGE) MULTIGRAVIDA 35+, UNSPECIFIED TRIMESTER: ICD-10-CM

## 2024-10-10 DIAGNOSIS — O16.9 HYPERTENSION AFFECTING PREGNANCY, ANTEPARTUM: Primary | ICD-10-CM

## 2024-10-10 DIAGNOSIS — O16.9 HYPERTENSION AFFECTING PREGNANCY, ANTEPARTUM: ICD-10-CM

## 2024-10-10 LAB — GLUCOSE BLDC GLUCOMTR-MCNC: 101 MG/DL (ref 70–130)

## 2024-10-10 PROCEDURE — 82948 REAGENT STRIP/BLOOD GLUCOSE: CPT

## 2024-10-10 PROCEDURE — 76816 OB US FOLLOW-UP PER FETUS: CPT

## 2024-10-10 PROCEDURE — 76819 FETAL BIOPHYS PROFIL W/O NST: CPT

## 2024-10-10 RX ORDER — NYSTATIN AND TRIAMCINOLONE ACETONIDE 100000; 1 [USP'U]/G; MG/G
1 OINTMENT TOPICAL 2 TIMES DAILY
Qty: 30 G | Refills: 3 | Status: SHIPPED | OUTPATIENT
Start: 2024-10-10

## 2024-10-10 NOTE — LETTER
October 10, 2024     Darius Uribe MD  [address]  [facility] [address]  [place] [id number]    Patient: Lavern Rehman   YOB: 1985   Date of Visit: 10/10/2024       Dear Darius Uribe MD    Lavern Rehman was in my office today. Below is a copy of my note.    If you have questions, please do not hesitate to call me. I look forward to following Lavern along with you.         Sincerely,        Tamiko Curry MD        MATERNAL FETAL MEDICINE Consult Note    Dear Dr Darius Uribe MD:    Thank you for your kind referral of Lavern Rehman.  As you know, she is a 39 y.o. G 2 P 0010 at 33 0/7 weeks gestation (Estimated Date of Delivery: 11/28/24). This is a consult.     Her antepartum course is complicated by:  Chronic Hypertension  Type 2 DM  AMA    Aneuploidy Screening: NIPT - Negative    HPI: Today, she denies headache, blurry vision, RUQ pain. No vaginal bleeding, no contractions.     Review of History:  Past Medical History:   Diagnosis Date   • Adhesive capsulitis of left shoulder 09/08/2022    Formatting of this note might be different from the original.   Added automatically from request for surgery [id number]     • Asthma 02/01/1991   • Congenital cystic kidney disease 05/20/2024   • Diabetes    • TERE (generalized anxiety disorder)    • GERD (gastroesophageal reflux disease)    • HTN (hypertension)    • Hypertension      Past Surgical History:   Procedure Laterality Date   • DILATATION AND CURETTAGE     • KNEE SURGERY     • LAPAROSCOPIC CHOLECYSTECTOMY     • SHOULDER SURGERY       Social History     Socioeconomic History   • Marital status:    Tobacco Use   • Smoking status: Never     Passive exposure: Never   • Smokeless tobacco: Never   Vaping Use   • Vaping status: Former   Substance and Sexual Activity   • Alcohol use: Not Currently   • Drug use: Not Currently   • Sexual activity: Yes     Partners: Male     Birth control/protection: Birth control pill     Family History   Problem  Relation Age of Onset   • Hypertension Father    • Breast cancer Maternal Grandmother    • Hypertension Maternal Grandfather    • Ovarian cancer Neg Hx    • Uterine cancer Neg Hx    • Colon cancer Neg Hx       Allergies   Allergen Reactions   • Shellfish-Derived Products Hives     N/v.   • Shellfish Allergy Hives   • Oxycodone-Acetaminophen Hives, Itching and Rash      Current Outpatient Medications on File Prior to Visit   Medication Sig Dispense Refill   • amLODIPine (NORVASC) 10 MG tablet Take 1 tablet by mouth Daily.     • aspirin 81 MG chewable tablet Chew 1 tablet Daily. 60 tablet 3   • Blood Glucose Monitoring Suppl (ONE TOUCH ULTRA 2) w/Device kit      • budesonide-formoterol (SYMBICORT) 80-4.5 MCG/ACT inhaler Inhale 2 puffs 2 (Two) Times a Day.     • ferrous sulfate 325 (65 FE) MG tablet Take 1 tablet by mouth Daily.     • glucose blood test strip Use to check blood sugar 7 times a day. Patient has a One Touch Ultra 2 glucometer. 200 each 10   • glucose blood test strip Use as instructed 200 each 12   • glucose monitor monitoring kit Use to check blood sugars 7 times daily, please dispense compatible lancets and test strips. 1 each 0   • Insulin Glargine (LANTUS SOLOSTAR) 100 UNIT/ML injection pen Please dispense insulin pen. Variable dosing up to 100 units a day. Starting dose: 35 units AM, 35 units PM. PLEASE PROCESS AS THERAPY CHANGE. (Patient taking differently: Please dispense insulin pen. Variable dosing up to 100 units a day. Current dose: 44 units AM, 44 units PM. PLEASE PROCESS AS THERAPY CHANGE.) 30 mL 6   • Insulin Pen Needle (Pen Needles) 32G X 4 MM misc Use 1 Needle 2 (Two) Times a Day. Use to inject insulin up to 5x daily. 200 each 3   • Lancets 30G misc To test glucose 7 times daily 200 each 6   • metFORMIN ER (GLUCOPHAGE-XR) 500 MG 24 hr tablet Take 2 tablets by mouth Daily With Breakfast & Dinner. 120 tablet 8   • OneTouch Delica Lancets 30G misc 1 Lancet by Other route Take As Directed.  "Use to check blood sugar 7 times a day. 200 each 10   • pantoprazole (PROTONIX) 20 MG EC tablet Take 1 tablet by mouth Daily.     • prenatal vitamins (PRENATAL 27-1) 27-1 MG tablet tablet Take  by mouth Daily.     • PROVENTIL  (90 BASE) MCG/ACT inhaler INHALE 1 PUFF EVERY 4 HOURS AS NEEDED. 6.7 inhaler 0   • ketoconazole (NIZORAL) 2 % cream prn       Current Facility-Administered Medications on File Prior to Visit   Medication Dose Route Frequency Provider Last Rate Last Admin   • ondansetron ODT (ZOFRAN-ODT) disintegrating tablet 4 mg  4 mg Translingual Q6H PRN Debra Lyon, CELINE            Past obstetric, gynecological, medical, surgical, family and social history reviewed.  Relevant lab work and imaging reviewed.    Review of systems  Constitutional:  denies fever, chills, malaise.   ENT/Mouth:  denies sore throat, tinnitus  Eyes: denies vision changes/pain  CV:  denies chest pain  Respiratory:  denies cough/SOB  GI:  denies N/V, diarrhea, abdominal pain.    :   denies dysuria  Skin:  denies lesions or pruritus   Neuro:  denies weakness, focal neurologic symptoms    Vitals:    10/10/24 1256   BP: 134/74   BP Location: Right arm   Patient Position: Sitting   Pulse: 103   Temp: 98.1 °F (36.7 °C)   TempSrc: Temporal   SpO2: 98%   Weight: (!) 146 kg (320 lb 12.8 oz)   Height: 172.7 cm (68\")           PHYSICAL EXAM   GENERAL: Not in acute distress, AAOx3, pleasant  CARDIO: regular rate and rhythm  PULM: symmetric chest rise, speaking in complete sentences without difficulty  NEURO: awake, alert and oriented to person, place, and time  ABDOMINAL: No fundal tenderness, no rebound or guarding, gravid  EXTREMITIES: no bilateral lower extremity edema/tenderness  SKIN: Warm, well-perfused      ULTRASOUND   Please view full ultrasound note on Imaging tab in ViewPoint.  Cephalic presentation.  Anterior placenta.  ALBA 23 cm, which is high normal.  EFW 1625 g (77%, AC 88%)  BPP 8/8  Follow up anatomy views appear " normal.      ASSESSMENT/COUNSELIN y.o. G 2 P 0010 at 33 0/7 weeks gestation (Estimated Date of Delivery: 24).     -Pregnancy  [ X ] stable  [   ] improving [  ] worsening    Diagnoses and all orders for this visit:    1. Hypertension affecting pregnancy, antepartum (Primary)    2. Advanced maternal age, primigravida, antepartum    3. Diabetes mellitus complicating pregnancy, unspecified trimester    Other orders  -     nystatin-triamcinolone (MYCOLOG) 858573-6.1 UNIT/GM-% ointment; Apply 1 Application topically to the appropriate area as directed 2 (Two) Times a Day.  Dispense: 30 g; Refill: 3        ADVANCED MATERNAL AGE  NIPT wnl.    Recommend fetal growth surveillance. Start  fetal surveillance with weekly BPP at 32-36 weeks.  Recommend baby ASA, which she is taking.      CHRONIC HYPERTENSION  Amlodipine 10 mg   Previously counseled.   Serial growths at MFM.  ANFS twice weekly between MFM and OB.  I would recommend a 38 week delivery given CHTN that has previously required medication.  She may require closer to 37 depending on her control closer to delivery.        PREGESTATIONAL DM, TYPE 2  Previously counseled.  Last A1c 2024 6.5  EKG wnl May 2024  Reports normal optho appt in May 2024    Congratulated--logs look very good today.  No lows. No changes made.      CURRENT REGIMEN  Metformin 1000 mg PO BID with meals  Lantus     We discussed diet recommendations, and goal pre-prandial values below 90 and 1-hour post-prandial values below 120 to optimize her pregnancy outcome as many studies have demonstrated that this is the normal range for pregnant women without diabetes.  We discussed meal timing and eating every 2-3 hours with 30g carbs at breakfast, 45 for lunch and dinner, and 10-15g snacks in between and at bedtime.  We also discussed insulin teaching and hypoglycemia management and to call with BG <60 or >200.      Fetal ECHO wnl.      Rash:  Sounds like yeast rash--she said  ketoconazole has helped before but has little data in pregnancy (discussed topical likely okay but does have some systemic absorption).  Will try nystatin due to better safety profile first.      Recommendations:  -ASA 81 mg--continue daily  -normal PIH labs, 24 hour urine  -Continue medications as directed  -Ultrasound for fetal growth every 4 weeks  - testing at 32 weeks gestation, twice weekly between [facility] and primary OB  -Sent nystatin cream.  -Continue routine prenatal care with primary ob team   -At present goal for delivery is 38 weeks for CHTN and DM    Follow-up:  weekly    Thank you for the consult and opportunity to care for this patient.  Please feel free to reach out with any questions or concerns.      I spent 25 minutes caring for this patient on this date of service. This time includes time spent by me in the following activities: preparing for the visit, reviewing tests, obtaining and/or reviewing a separately obtained history, performing a medically appropriate examination and/or evaluation, counseling and educating the patient/family/caregiver and independently interpreting results and communicating that information with the patient/family/caregiver with greater than 50% spent in counseling and coordination of care.     4 minutes reading US.    Tamiko Curry MD FACOG  Maternal Fetal Medicine-[facility]  Office: [phone]  [email or web address]

## 2024-10-10 NOTE — PROGRESS NOTES
Pt reports that she is doing well and denies vaginal bleeding, cramping, contractions or LOF at this time. Reports active fetal movement. Reviewed when to call OB office or present to L&D for evaluation with symptoms such as decreased fetal movement, vaginal bleeding, LOF or ctxs. Pt verbalized understanding. Denies HA, visual changes or epigastric pain. Currently has a rash. Denies any additional complaints at time of appointment. Next OB appointment scheduled for 10/15.    Blood sugar at time of appointment is 101. Logs on chart for MFM to review.     Vitals:    10/10/24 1256   BP: 134/74   Pulse: 103   Temp: 98.1 °F (36.7 °C)   SpO2: 98%

## 2024-10-10 NOTE — PROGRESS NOTES
MATERNAL FETAL MEDICINE Consult Note    Dear Dr Darius Uribe MD:    Thank you for your kind referral of Lavern Rehman.  As you know, she is a 39 y.o. G 2 P 0010 at 33 0/7 weeks gestation (Estimated Date of Delivery: 11/28/24). This is a consult.     Her antepartum course is complicated by:  Chronic Hypertension  Type 2 DM  AMA    Aneuploidy Screening: NIPT - Negative    HPI: Today, she denies headache, blurry vision, RUQ pain. No vaginal bleeding, no contractions.     Review of History:  Past Medical History:   Diagnosis Date    Adhesive capsulitis of left shoulder 09/08/2022    Formatting of this note might be different from the original.   Added automatically from request for surgery [id number]      Asthma 02/01/1991    Congenital cystic kidney disease 05/20/2024    Diabetes     TERE (generalized anxiety disorder)     GERD (gastroesophageal reflux disease)     HTN (hypertension)     Hypertension      Past Surgical History:   Procedure Laterality Date    DILATATION AND CURETTAGE      KNEE SURGERY      LAPAROSCOPIC CHOLECYSTECTOMY      SHOULDER SURGERY       Social History     Socioeconomic History    Marital status:    Tobacco Use    Smoking status: Never     Passive exposure: Never    Smokeless tobacco: Never   Vaping Use    Vaping status: Former   Substance and Sexual Activity    Alcohol use: Not Currently    Drug use: Not Currently    Sexual activity: Yes     Partners: Male     Birth control/protection: Birth control pill     Family History   Problem Relation Age of Onset    Hypertension Father     Breast cancer Maternal Grandmother     Hypertension Maternal Grandfather     Ovarian cancer Neg Hx     Uterine cancer Neg Hx     Colon cancer Neg Hx       Allergies   Allergen Reactions    Shellfish-Derived Products Hives     N/v.    Shellfish Allergy Hives    Oxycodone-Acetaminophen Hives, Itching and Rash      Current Outpatient Medications on File Prior to Visit   Medication Sig Dispense Refill     amLODIPine (NORVASC) 10 MG tablet Take 1 tablet by mouth Daily.      aspirin 81 MG chewable tablet Chew 1 tablet Daily. 60 tablet 3    Blood Glucose Monitoring Suppl (ONE TOUCH ULTRA 2) w/Device kit       budesonide-formoterol (SYMBICORT) 80-4.5 MCG/ACT inhaler Inhale 2 puffs 2 (Two) Times a Day.      ferrous sulfate 325 (65 FE) MG tablet Take 1 tablet by mouth Daily.      glucose blood test strip Use to check blood sugar 7 times a day. Patient has a One Touch Ultra 2 glucometer. 200 each 10    glucose blood test strip Use as instructed 200 each 12    glucose monitor monitoring kit Use to check blood sugars 7 times daily, please dispense compatible lancets and test strips. 1 each 0    Insulin Glargine (LANTUS SOLOSTAR) 100 UNIT/ML injection pen Please dispense insulin pen. Variable dosing up to 100 units a day. Starting dose: 35 units AM, 35 units PM. PLEASE PROCESS AS THERAPY CHANGE. (Patient taking differently: Please dispense insulin pen. Variable dosing up to 100 units a day. Current dose: 44 units AM, 44 units PM. PLEASE PROCESS AS THERAPY CHANGE.) 30 mL 6    Insulin Pen Needle (Pen Needles) 32G X 4 MM misc Use 1 Needle 2 (Two) Times a Day. Use to inject insulin up to 5x daily. 200 each 3    Lancets 30G misc To test glucose 7 times daily 200 each 6    metFORMIN ER (GLUCOPHAGE-XR) 500 MG 24 hr tablet Take 2 tablets by mouth Daily With Breakfast & Dinner. 120 tablet 8    OneTouch Delica Lancets 30G misc 1 Lancet by Other route Take As Directed. Use to check blood sugar 7 times a day. 200 each 10    pantoprazole (PROTONIX) 20 MG EC tablet Take 1 tablet by mouth Daily.      prenatal vitamins (PRENATAL 27-1) 27-1 MG tablet tablet Take  by mouth Daily.      PROVENTIL  (90 BASE) MCG/ACT inhaler INHALE 1 PUFF EVERY 4 HOURS AS NEEDED. 6.7 inhaler 0    ketoconazole (NIZORAL) 2 % cream prn       Current Facility-Administered Medications on File Prior to Visit   Medication Dose Route Frequency Provider Last Rate  "Last Admin    ondansetron ODT (ZOFRAN-ODT) disintegrating tablet 4 mg  4 mg Translingual Q6H PRN Debra Lyon, CELINE            Past obstetric, gynecological, medical, surgical, family and social history reviewed.  Relevant lab work and imaging reviewed.    Review of systems  Constitutional:  denies fever, chills, malaise.   ENT/Mouth:  denies sore throat, tinnitus  Eyes: denies vision changes/pain  CV:  denies chest pain  Respiratory:  denies cough/SOB  GI:  denies N/V, diarrhea, abdominal pain.    :   denies dysuria  Skin:  denies lesions or pruritus   Neuro:  denies weakness, focal neurologic symptoms    Vitals:    10/10/24 1256   BP: 134/74   BP Location: Right arm   Patient Position: Sitting   Pulse: 103   Temp: 98.1 °F (36.7 °C)   TempSrc: Temporal   SpO2: 98%   Weight: (!) 146 kg (320 lb 12.8 oz)   Height: 172.7 cm (68\")           PHYSICAL EXAM   GENERAL: Not in acute distress, AAOx3, pleasant  CARDIO: regular rate and rhythm  PULM: symmetric chest rise, speaking in complete sentences without difficulty  NEURO: awake, alert and oriented to person, place, and time  ABDOMINAL: No fundal tenderness, no rebound or guarding, gravid  EXTREMITIES: no bilateral lower extremity edema/tenderness  SKIN: Warm, well-perfused      ULTRASOUND   Please view full ultrasound note on Imaging tab in ViewPoint.  Cephalic presentation.  Anterior placenta.  ALBA 23 cm, which is high normal.  EFW 1625 g (77%, AC 88%)  BPP 8/8  Follow up anatomy views appear normal.      ASSESSMENT/COUNSELIN y.o. G 2 P 0010 at 33 0/7 weeks gestation (Estimated Date of Delivery: 24).     -Pregnancy  [ X ] stable  [   ] improving [  ] worsening    Diagnoses and all orders for this visit:    1. Hypertension affecting pregnancy, antepartum (Primary)    2. Advanced maternal age, primigravida, antepartum    3. Diabetes mellitus complicating pregnancy, unspecified trimester    Other orders  -     nystatin-triamcinolone (MYCOLOG) 277135-0.1 " UNIT/GM-% ointment; Apply 1 Application topically to the appropriate area as directed 2 (Two) Times a Day.  Dispense: 30 g; Refill: 3        ADVANCED MATERNAL AGE  NIPT wnl.    Recommend fetal growth surveillance. Start  fetal surveillance with weekly BPP at 32-36 weeks.  Recommend baby ASA, which she is taking.      CHRONIC HYPERTENSION  Amlodipine 10 mg   Previously counseled.   Serial growths at M.  ANFS twice weekly between M and OB.  I would recommend a 38 week delivery given CHTN that has previously required medication.  She may require closer to 37 depending on her control closer to delivery.        PREGESTATIONAL DM, TYPE 2  Previously counseled.  Last A1c 2024 6.5  EKG wnl May 2024  Reports normal optho appt in May 2024    Congratulated--logs look very good today.  No lows. No changes made.      CURRENT REGIMEN  Metformin 1000 mg PO BID with meals  Lantus 44/44    We discussed diet recommendations, and goal pre-prandial values below 90 and 1-hour post-prandial values below 120 to optimize her pregnancy outcome as many studies have demonstrated that this is the normal range for pregnant women without diabetes.  We discussed meal timing and eating every 2-3 hours with 30g carbs at breakfast, 45 for lunch and dinner, and 10-15g snacks in between and at bedtime.  We also discussed insulin teaching and hypoglycemia management and to call with BG <60 or >200.      Fetal ECHO wnl.      Rash:  Sounds like yeast rash--she said ketoconazole has helped before but has little data in pregnancy (discussed topical likely okay but does have some systemic absorption).  Will try nystatin due to better safety profile first.      Recommendations:  -ASA 81 mg--continue daily  -normal PIH labs, 24 hour urine  -Continue medications as directed  -Ultrasound for fetal growth every 4 weeks  - testing at 32 weeks gestation, twice weekly between M and primary OB  -Sent nystatin cream.  -Continue routine  prenatal care with primary ob team   -At present goal for delivery is 38 weeks for CHTN and DM    Follow-up:  weekly    Thank you for the consult and opportunity to care for this patient.  Please feel free to reach out with any questions or concerns.      I spent 25 minutes caring for this patient on this date of service. This time includes time spent by me in the following activities: preparing for the visit, reviewing tests, obtaining and/or reviewing a separately obtained history, performing a medically appropriate examination and/or evaluation, counseling and educating the patient/family/caregiver and independently interpreting results and communicating that information with the patient/family/caregiver with greater than 50% spent in counseling and coordination of care.     4 minutes reading US.    Tamiko Curry MD FACOG  Maternal Fetal Medicine-[facility]  Office: [phone]  [email or web address]

## 2024-10-15 RX ORDER — LORATADINE 10 MG
81 TABLET ORAL DAILY
Qty: 90 TABLET | Refills: 2 | OUTPATIENT
Start: 2024-10-15

## 2024-10-15 NOTE — TELEPHONE ENCOUNTER
You should have 1 more refill of your Aspirin 81 mg at [facility]. Please contact [facility] for your refill. If there is a problem, please let us know. My Chart message sent.

## 2024-10-17 ENCOUNTER — HOSPITAL ENCOUNTER (OUTPATIENT)
Dept: ULTRASOUND IMAGING | Facility: HOSPITAL | Age: 39
Discharge: HOME | End: 2024-10-17
Admitting: INTERNAL MEDICINE
Payer: COMMERCIAL

## 2024-10-17 ENCOUNTER — OFFICE VISIT (OUTPATIENT)
Dept: OBSTETRICS AND GYNECOLOGY | Facility: CLINIC | Age: 39
End: 2024-10-17
Payer: COMMERCIAL

## 2024-10-17 VITALS
DIASTOLIC BLOOD PRESSURE: 72 MMHG | WEIGHT: 293 LBS | HEART RATE: 93 BPM | BODY MASS INDEX: 48.6 KG/M2 | TEMPERATURE: 98.2 F | SYSTOLIC BLOOD PRESSURE: 136 MMHG

## 2024-10-17 DIAGNOSIS — O24.919 DIABETES MELLITUS COMPLICATING PREGNANCY, UNSPECIFIED TRIMESTER: Primary | ICD-10-CM

## 2024-10-17 DIAGNOSIS — O16.9 HYPERTENSION AFFECTING PREGNANCY, ANTEPARTUM: ICD-10-CM

## 2024-10-17 DIAGNOSIS — O40.3XX0 POLYHYDRAMNIOS IN THIRD TRIMESTER COMPLICATION, SINGLE OR UNSPECIFIED FETUS: ICD-10-CM

## 2024-10-17 DIAGNOSIS — O09.519 ADVANCED MATERNAL AGE, PRIMIGRAVIDA, ANTEPARTUM: ICD-10-CM

## 2024-10-17 DIAGNOSIS — Z3A.34 34 WEEKS GESTATION OF PREGNANCY: ICD-10-CM

## 2024-10-17 LAB — GLUCOSE BLDC GLUCOMTR-MCNC: 71 MG/DL (ref 70–130)

## 2024-10-17 PROCEDURE — 82948 REAGENT STRIP/BLOOD GLUCOSE: CPT

## 2024-10-17 PROCEDURE — 76819 FETAL BIOPHYS PROFIL W/O NST: CPT

## 2024-10-17 NOTE — LETTER
October 18, 2024     Darius Uribe MD  [address]  [facility] [address]  [place] [id number]    Patient: Lavern Rehman   YOB: 1985   Date of Visit: 10/17/2024       Dear Darius Uribe MD    Lavern Rehman was in my office today. Below is a copy of my note.    If you have questions, please do not hesitate to call me. I look forward to following Lavern along with you.         Sincerely,        Leah Martel MD        CC: No Recipients    Pt reports that she is doing well and denies vaginal bleeding, cramping, contractions or LOF at this time. Reports active fetal movement. Reviewed when to call OB office or present to L&D for evaluation with symptoms such as decreased fetal movement, vaginal bleeding, LOF or ctxs. Pt verbalized understanding. Denies HA, visual changes or epigastric pain. Denies any additional complaints at time of appointment. Next OB appointment scheduled for 10/22.    No logs brought to appointment, will send logs in Friday morning. Blood sugar taken at time of appointment with a value of 71 noted.     Vitals:    10/17/24 1500   BP: 136/72   Pulse: 93   Temp: 98.2 °F (36.8 °C)          MATERNAL FETAL MEDICINE Consult Note    Dear Dr Darius Uribe MD:    Thank you for your kind referral of Lavern Rehman.  As you know, she is a 39 y.o. G 2 P 0010 at 34 0/7 weeks gestation (Estimated Date of Delivery: 11/28/24). This is a consult.     Her antepartum course is complicated by:  Chronic Hypertension  Type 2 DM  AMA    Aneuploidy Screening: NIPT - Negative    HPI: No contractions, LOF, vaginal bleeding. Normal fetal movement.   She denies headache, vision changes, shortness of breath, acute changes in edema, and RUQ pain.       Review of History:  Past Medical History:   Diagnosis Date   • Adhesive capsulitis of left shoulder 09/08/2022    Formatting of this note might be different from the original.   Added automatically from request for surgery [id number]     • Asthma  02/01/1991   • Congenital cystic kidney disease 05/20/2024   • Diabetes    • TERE (generalized anxiety disorder)    • GERD (gastroesophageal reflux disease)    • HTN (hypertension)    • Hypertension      Past Surgical History:   Procedure Laterality Date   • DILATATION AND CURETTAGE     • KNEE SURGERY     • LAPAROSCOPIC CHOLECYSTECTOMY     • SHOULDER SURGERY       Social History     Socioeconomic History   • Marital status:    Tobacco Use   • Smoking status: Never     Passive exposure: Never   • Smokeless tobacco: Never   Vaping Use   • Vaping status: Former   Substance and Sexual Activity   • Alcohol use: Not Currently   • Drug use: Not Currently   • Sexual activity: Yes     Partners: Male     Birth control/protection: Birth control pill     Family History   Problem Relation Age of Onset   • Hypertension Father    • Breast cancer Maternal Grandmother    • Hypertension Maternal Grandfather    • Ovarian cancer Neg Hx    • Uterine cancer Neg Hx    • Colon cancer Neg Hx       Allergies   Allergen Reactions   • Shellfish-Derived Products Hives     N/v.   • Shellfish Allergy Hives   • Oxycodone-Acetaminophen Hives, Itching and Rash      Current Outpatient Medications on File Prior to Visit   Medication Sig Dispense Refill   • amLODIPine (NORVASC) 10 MG tablet Take 1 tablet by mouth Daily.     • Blood Glucose Monitoring Suppl (ONE TOUCH ULTRA 2) w/Device kit      • budesonide-formoterol (SYMBICORT) 80-4.5 MCG/ACT inhaler Inhale 2 puffs 2 (Two) Times a Day.     • ferrous sulfate 325 (65 FE) MG tablet Take 1 tablet by mouth Daily.     • glucose blood test strip Use to check blood sugar 7 times a day. Patient has a One Touch Ultra 2 glucometer. 200 each 10   • glucose blood test strip Use as instructed 200 each 12   • glucose monitor monitoring kit Use to check blood sugars 7 times daily, please dispense compatible lancets and test strips. 1 each 0   • Insulin Glargine (LANTUS SOLOSTAR) 100 UNIT/ML injection pen Please  dispense insulin pen. Variable dosing up to 100 units a day. Starting dose: 35 units AM, 35 units PM. PLEASE PROCESS AS THERAPY CHANGE. (Patient taking differently: Please dispense insulin pen. Variable dosing up to 100 units a day. Current dose: 44 units AM, 44 units PM. PLEASE PROCESS AS THERAPY CHANGE.) 30 mL 6   • Insulin Pen Needle (Pen Needles) 32G X 4 MM misc Use 1 Needle 2 (Two) Times a Day. Use to inject insulin up to 5x daily. 200 each 3   • Lancets 30G misc To test glucose 7 times daily 200 each 6   • metFORMIN ER (GLUCOPHAGE-XR) 500 MG 24 hr tablet Take 2 tablets by mouth Daily With Breakfast & Dinner. 120 tablet 8   • nystatin-triamcinolone (MYCOLOG) 750441-0.1 UNIT/GM-% ointment Apply 1 Application topically to the appropriate area as directed 2 (Two) Times a Day. 30 g 3   • OneTouch Delica Lancets 30G misc 1 Lancet by Other route Take As Directed. Use to check blood sugar 7 times a day. 200 each 10   • pantoprazole (PROTONIX) 20 MG EC tablet Take 1 tablet by mouth Daily.     • prenatal vitamins (PRENATAL 27-1) 27-1 MG tablet tablet Take  by mouth Daily.     • PROVENTIL  (90 BASE) MCG/ACT inhaler INHALE 1 PUFF EVERY 4 HOURS AS NEEDED. 6.7 inhaler 0   • [DISCONTINUED] aspirin 81 MG chewable tablet Chew 1 tablet Daily. 60 tablet 3   • ketoconazole (NIZORAL) 2 % cream prn (Patient not taking: Reported on 10/17/2024)       Current Facility-Administered Medications on File Prior to Visit   Medication Dose Route Frequency Provider Last Rate Last Admin   • ondansetron ODT (ZOFRAN-ODT) disintegrating tablet 4 mg  4 mg Translingual Q6H PRN Debra Lyon APRN            Past obstetric, gynecological, medical, surgical, family and social history reviewed.  Relevant lab work and imaging reviewed.    Review of systems  As above in HPI     Vitals:    10/17/24 1500   BP: 136/72   BP Location: Right arm   Patient Position: Sitting   Pulse: 93   Temp: 98.2 °F (36.8 °C)   TempSrc: Temporal   Weight: (!) 145  kg (319 lb 9.6 oz)           PHYSICAL EXAM   General: No acute distress  Respiratory: No increased work of breathing  Cardiac: regular rate  Abdominal: Gravid, nontender  Extremities: No significant edema  Neuro/psych: Alert and oriented, appropriate mood        ULTRASOUND   Please view full ultrasound note on Imaging tab in ViewPoint.  Breech presentation  Anterior placenta  ALBA 19 cm which is normal  Fetal biometry is consistent with dates EFW 37 percentile, AC 23rd percentile  The fetal anatomic survey is now complete and appears normal    She was counseled regarding the limitations of ultrasound.       ASSESSMENT/COUNSELIN y.o. G 2 P 0010 at 34 0/7 weeks gestation (Estimated Date of Delivery: 24).     -Pregnancy  [ X ] stable  [   ] improving [  ] worsening    Diagnoses and all orders for this visit:    1. Diabetes mellitus complicating pregnancy, unspecified trimester (Primary)    2. 34 weeks gestation of pregnancy    3. Hypertension affecting pregnancy, antepartum    4. Advanced maternal age, primigravida, antepartum    5. Polyhydramnios in third trimester complication, single or unspecified fetus          ADVANCED MATERNAL AGE  NIPT wnl.    Recommend fetal growth surveillance. Start  fetal surveillance with weekly BPP at 32-36 weeks.  Recommend baby ASA, which she is taking.      POLYHYDRAMNIOS, MILD  Patient was noted to have mild polyhydramnios of ALBA 25 and MVP 8.7cm. This is considered mildly elevated.    We reviewed the various etiologies of polyhydramnios including diabetes, idiopathic, aneuploidy, fetal infection, neuromuscular disorders impacting swallowing.  We reviewed that given her polyhydramnios is mild the most common etiology is idiopathic or diabetes related. She has proven excellent control of her diabetes so this is not suspected.  Discussed that there are patients who have delayed development of gestational diabetes.  The risk of  diagnosis of an anomaly in the  setting of mild polyhydramnios is<5%.    CHRONIC HYPERTENSION  Amlodipine 10 mg   Previously counseled.   Serial growths at [facility].  ANFS twice weekly between [facility] and OB.  I would recommend a 38 week delivery given CHTN that has previously required medication.  She may require closer to 37 depending on her control closer to delivery.        PREGESTATIONAL DM, TYPE 2  Previously counseled.  Last A1c 2024 6.5  EKG wnl May 2024  Reports normal optho appt in May 2024    I have lox today.  They were reviewed on 10/18/2024 the following day.  These were all noted to be within normal limits and there were no changes made to her insulin regimen.    CURRENT REGIMEN  Metformin 1000 mg PO BID with meals  Lantus       Fetal ECHO wnl.      Rash:  Sounds like yeast rash--she said ketoconazole has helped before but has little data in pregnancy (discussed topical likely okay but does have some systemic absorption).  Will try nystatin due to better safety profile first.      Recommendations:  -ASA 81 mg--continue daily  -normal PIH labs, 24 hour urine  -Continue medications as directed  -Ultrasound for fetal growth every 4 weeks  - testing at 32 weeks gestation, twice weekly between [facility] and primary OB  -Sent nystatin cream.  -Continue routine prenatal care with primary ob team   -At present goal for delivery is 38 weeks for CHTN and DM    Follow-up:  weekly    Thank you for the consult and opportunity to care for this patient.  Please feel free to reach out with any questions or concerns.      I spent 20 minutes caring for this patient on this date of service. This time includes time spent by me in the following activities: preparing for the visit, reviewing tests, obtaining and/or reviewing a separately obtained history, performing a medically appropriate examination and/or evaluation, counseling and educating the patient/family/caregiver and independently interpreting results and communicating that information with  the patient/family/caregiver with greater than 50% spent in counseling and coordination of care.     4 minutes reading US.  Leah Martel MD   Maternal Fetal Medicine-[facility]  Office: [phone]

## 2024-10-17 NOTE — PROGRESS NOTES
Pt reports that she is doing well and denies vaginal bleeding, cramping, contractions or LOF at this time. Reports active fetal movement. Reviewed when to call OB office or present to L&D for evaluation with symptoms such as decreased fetal movement, vaginal bleeding, LOF or ctxs. Pt verbalized understanding. Denies HA, visual changes or epigastric pain. Denies any additional complaints at time of appointment. Next OB appointment scheduled for 10/22.    No logs brought to appointment, will send logs in Friday morning. Blood sugar taken at time of appointment with a value of 71 noted.     Vitals:    10/17/24 1500   BP: 136/72   Pulse: 93   Temp: 98.2 °F (36.8 °C)

## 2024-10-18 ENCOUNTER — DOCUMENTATION (OUTPATIENT)
Dept: OBSTETRICS AND GYNECOLOGY | Facility: CLINIC | Age: 39
End: 2024-10-18
Payer: COMMERCIAL

## 2024-10-18 RX ORDER — ASPIRIN 81 MG/1
81 TABLET, CHEWABLE ORAL DAILY
Qty: 60 TABLET | Refills: 3 | Status: SHIPPED | OUTPATIENT
Start: 2024-10-18

## 2024-10-18 NOTE — PROGRESS NOTES
MATERNAL FETAL MEDICINE Consult Note    Dear Dr Darius Uribe MD:    Thank you for your kind referral of Lavern Rehman.  As you know, she is a 39 y.o. G 2 P 0010 at 34 0/7 weeks gestation (Estimated Date of Delivery: 11/28/24). This is a consult.     Her antepartum course is complicated by:  Chronic Hypertension  Type 2 DM  AMA    Aneuploidy Screening: NIPT - Negative    HPI: No contractions, LOF, vaginal bleeding. Normal fetal movement.   She denies headache, vision changes, shortness of breath, acute changes in edema, and RUQ pain.       Review of History:  Past Medical History:   Diagnosis Date    Adhesive capsulitis of left shoulder 09/08/2022    Formatting of this note might be different from the original.   Added automatically from request for surgery [id number]      Asthma 02/01/1991    Congenital cystic kidney disease 05/20/2024    Diabetes     TERE (generalized anxiety disorder)     GERD (gastroesophageal reflux disease)     HTN (hypertension)     Hypertension      Past Surgical History:   Procedure Laterality Date    DILATATION AND CURETTAGE      KNEE SURGERY      LAPAROSCOPIC CHOLECYSTECTOMY      SHOULDER SURGERY       Social History     Socioeconomic History    Marital status:    Tobacco Use    Smoking status: Never     Passive exposure: Never    Smokeless tobacco: Never   Vaping Use    Vaping status: Former   Substance and Sexual Activity    Alcohol use: Not Currently    Drug use: Not Currently    Sexual activity: Yes     Partners: Male     Birth control/protection: Birth control pill     Family History   Problem Relation Age of Onset    Hypertension Father     Breast cancer Maternal Grandmother     Hypertension Maternal Grandfather     Ovarian cancer Neg Hx     Uterine cancer Neg Hx     Colon cancer Neg Hx       Allergies   Allergen Reactions    Shellfish-Derived Products Hives     N/v.    Shellfish Allergy Hives    Oxycodone-Acetaminophen Hives, Itching and Rash      Current Outpatient  Medications on File Prior to Visit   Medication Sig Dispense Refill    amLODIPine (NORVASC) 10 MG tablet Take 1 tablet by mouth Daily.      Blood Glucose Monitoring Suppl (ONE TOUCH ULTRA 2) w/Device kit       budesonide-formoterol (SYMBICORT) 80-4.5 MCG/ACT inhaler Inhale 2 puffs 2 (Two) Times a Day.      ferrous sulfate 325 (65 FE) MG tablet Take 1 tablet by mouth Daily.      glucose blood test strip Use to check blood sugar 7 times a day. Patient has a One Touch Ultra 2 glucometer. 200 each 10    glucose blood test strip Use as instructed 200 each 12    glucose monitor monitoring kit Use to check blood sugars 7 times daily, please dispense compatible lancets and test strips. 1 each 0    Insulin Glargine (LANTUS SOLOSTAR) 100 UNIT/ML injection pen Please dispense insulin pen. Variable dosing up to 100 units a day. Starting dose: 35 units AM, 35 units PM. PLEASE PROCESS AS THERAPY CHANGE. (Patient taking differently: Please dispense insulin pen. Variable dosing up to 100 units a day. Current dose: 44 units AM, 44 units PM. PLEASE PROCESS AS THERAPY CHANGE.) 30 mL 6    Insulin Pen Needle (Pen Needles) 32G X 4 MM misc Use 1 Needle 2 (Two) Times a Day. Use to inject insulin up to 5x daily. 200 each 3    Lancets 30G misc To test glucose 7 times daily 200 each 6    metFORMIN ER (GLUCOPHAGE-XR) 500 MG 24 hr tablet Take 2 tablets by mouth Daily With Breakfast & Dinner. 120 tablet 8    nystatin-triamcinolone (MYCOLOG) 954564-6.1 UNIT/GM-% ointment Apply 1 Application topically to the appropriate area as directed 2 (Two) Times a Day. 30 g 3    OneTouch Delica Lancets 30G misc 1 Lancet by Other route Take As Directed. Use to check blood sugar 7 times a day. 200 each 10    pantoprazole (PROTONIX) 20 MG EC tablet Take 1 tablet by mouth Daily.      prenatal vitamins (PRENATAL 27-1) 27-1 MG tablet tablet Take  by mouth Daily.      PROVENTIL  (90 BASE) MCG/ACT inhaler INHALE 1 PUFF EVERY 4 HOURS AS NEEDED. 6.7 inhaler 0     [DISCONTINUED] aspirin 81 MG chewable tablet Chew 1 tablet Daily. 60 tablet 3    ketoconazole (NIZORAL) 2 % cream prn (Patient not taking: Reported on 10/17/2024)       Current Facility-Administered Medications on File Prior to Visit   Medication Dose Route Frequency Provider Last Rate Last Admin    ondansetron ODT (ZOFRAN-ODT) disintegrating tablet 4 mg  4 mg Translingual Q6H PRN Debra Lyon APRN            Past obstetric, gynecological, medical, surgical, family and social history reviewed.  Relevant lab work and imaging reviewed.    Review of systems  As above in HPI     Vitals:    10/17/24 1500   BP: 136/72   BP Location: Right arm   Patient Position: Sitting   Pulse: 93   Temp: 98.2 °F (36.8 °C)   TempSrc: Temporal   Weight: (!) 145 kg (319 lb 9.6 oz)           PHYSICAL EXAM   General: No acute distress  Respiratory: No increased work of breathing  Cardiac: regular rate  Abdominal: Gravid, nontender  Extremities: No significant edema  Neuro/psych: Alert and oriented, appropriate mood        ULTRASOUND   Please view full ultrasound note on Imaging tab in ViewPoint.  Breech presentation  Anterior placenta  ALBA 19 cm which is normal  Fetal biometry is consistent with dates EFW 37 percentile, AC 23rd percentile  The fetal anatomic survey is now complete and appears normal    She was counseled regarding the limitations of ultrasound.       ASSESSMENT/COUNSELIN y.o. G 2 P 0010 at 34 0/7 weeks gestation (Estimated Date of Delivery: 24).     -Pregnancy  [ X ] stable  [   ] improving [  ] worsening    Diagnoses and all orders for this visit:    1. Diabetes mellitus complicating pregnancy, unspecified trimester (Primary)    2. 34 weeks gestation of pregnancy    3. Hypertension affecting pregnancy, antepartum    4. Advanced maternal age, primigravida, antepartum    5. Polyhydramnios in third trimester complication, single or unspecified fetus          ADVANCED MATERNAL AGE  NIPT wnl.    Recommend fetal  growth surveillance. Start  fetal surveillance with weekly BPP at 32-36 weeks.  Recommend baby ASA, which she is taking.      POLYHYDRAMNIOS, MILD  Patient was noted to have mild polyhydramnios of ALBA 25 and MVP 8.7cm. This is considered mildly elevated.    We reviewed the various etiologies of polyhydramnios including diabetes, idiopathic, aneuploidy, fetal infection, neuromuscular disorders impacting swallowing.  We reviewed that given her polyhydramnios is mild the most common etiology is idiopathic or diabetes related. She has proven excellent control of her diabetes so this is not suspected.  Discussed that there are patients who have delayed development of gestational diabetes.  The risk of  diagnosis of an anomaly in the setting of mild polyhydramnios is<5%.    CHRONIC HYPERTENSION  Amlodipine 10 mg   Previously counseled.   Serial growths at MFM.  ANFS twice weekly between MFM and OB.  I would recommend a 38 week delivery given CHTN that has previously required medication.  She may require closer to 37 depending on her control closer to delivery.        PREGESTATIONAL DM, TYPE 2  Previously counseled.  Last A1c 2024 6.5  EKG wnl May 2024  Reports normal optho appt in May 2024    I have lox today.  They were reviewed on 10/18/2024 the following day.  These were all noted to be within normal limits and there were no changes made to her insulin regimen.    CURRENT REGIMEN  Metformin 1000 mg PO BID with meals  Lantus       Fetal ECHO wnl.      Rash:  Sounds like yeast rash--she said ketoconazole has helped before but has little data in pregnancy (discussed topical likely okay but does have some systemic absorption).  Will try nystatin due to better safety profile first.      Recommendations:  -ASA 81 mg--continue daily  -normal PIH labs, 24 hour urine  -Continue medications as directed  -Ultrasound for fetal growth every 4 weeks  - testing at 32 weeks gestation, twice weekly  between M and primary OB  -Sent nystatin cream.  -Continue routine prenatal care with primary ob team   -At present goal for delivery is 38 weeks for CHTN and DM    Follow-up:  weekly    Thank you for the consult and opportunity to care for this patient.  Please feel free to reach out with any questions or concerns.      I spent 20 minutes caring for this patient on this date of service. This time includes time spent by me in the following activities: preparing for the visit, reviewing tests, obtaining and/or reviewing a separately obtained history, performing a medically appropriate examination and/or evaluation, counseling and educating the patient/family/caregiver and independently interpreting results and communicating that information with the patient/family/caregiver with greater than 50% spent in counseling and coordination of care.     4 minutes reading US.  Leah Martel MD   Maternal Fetal Medicine-[facility]  Office: [phone]

## 2024-10-18 NOTE — PROGRESS NOTES
Weekly MFM glucose log evaluation. No adjustments needed today. Pt encouraged to continue the following medication regimen:    Lantus: 44/44  Metformin: 1000mg BID    MFM will review glucose logs again on 10/24.

## 2024-10-22 ENCOUNTER — ROUTINE PRENATAL (OUTPATIENT)
Dept: OBSTETRICS AND GYNECOLOGY | Facility: CLINIC | Age: 39
End: 2024-10-22
Payer: COMMERCIAL

## 2024-10-22 VITALS — SYSTOLIC BLOOD PRESSURE: 150 MMHG | DIASTOLIC BLOOD PRESSURE: 89 MMHG | BODY MASS INDEX: 48.29 KG/M2 | WEIGHT: 293 LBS

## 2024-10-22 DIAGNOSIS — Z3A.34 34 WEEKS GESTATION OF PREGNANCY: Primary | ICD-10-CM

## 2024-10-22 DIAGNOSIS — O99.210 MATERNAL MORBID OBESITY, ANTEPARTUM: ICD-10-CM

## 2024-10-22 DIAGNOSIS — Z34.83 PRENATAL CARE, SUBSEQUENT PREGNANCY IN THIRD TRIMESTER: ICD-10-CM

## 2024-10-22 DIAGNOSIS — O16.9 HYPERTENSION AFFECTING PREGNANCY, ANTEPARTUM: ICD-10-CM

## 2024-10-22 DIAGNOSIS — E66.01 MATERNAL MORBID OBESITY, ANTEPARTUM: ICD-10-CM

## 2024-10-22 DIAGNOSIS — O09.519 ADVANCED MATERNAL AGE, PRIMIGRAVIDA, ANTEPARTUM: ICD-10-CM

## 2024-10-22 DIAGNOSIS — O24.919 DIABETES MELLITUS COMPLICATING PREGNANCY, UNSPECIFIED TRIMESTER: ICD-10-CM

## 2024-10-22 DIAGNOSIS — O40.3XX0 POLYHYDRAMNIOS IN THIRD TRIMESTER COMPLICATION, SINGLE OR UNSPECIFIED FETUS: ICD-10-CM

## 2024-10-22 LAB
GLUCOSE UR STRIP-MCNC: ABNORMAL MG/DL
PROT UR STRIP-MCNC: ABNORMAL MG/DL

## 2024-10-22 NOTE — PROGRESS NOTES
OB follow up     Lavern Rehman is a 39 y.o.  34w5d being seen today for her obstetrical visit.  Patient reports no complaints. Fetal movement: normal.  Biophysical profile today is 8/8 with mild polyhydramnios with an amniotic fluid index of 26 cm  She denies headache or blurred vision or any problems with her blood pressure at home and was rushing through traffic to get to her appointment today.  Her prenatal care is complicated by (and status): AMA, chronic hypertension, diabetes, obesity    Review of Systems  Cramping/contractions : None  Vaginal bleeding: Negative  Fetal movement is normal    /89   Wt (!) 144 kg (317 lb 9.6 oz)   LMP 2024 (Exact Date)   BMI 48.29 kg/m²     FHT: 171 BPM   Uterine Size: size equals dates       Assessment    Diagnoses and all orders for this visit:    1. 34 weeks gestation of pregnancy (Primary)  -     POC Urinalysis Dipstick    2. Prenatal care, subsequent pregnancy in third trimester    3. Diabetes mellitus complicating pregnancy, unspecified trimester    4. Hypertension affecting pregnancy, antepartum    5. Advanced maternal age, primigravida, antepartum    6. Maternal morbid obesity, antepartum    7. Polyhydramnios in third trimester complication, single or unspecified fetus        1) pregnancy at 34w5d         Plan    Reviewed this stage of pregnancy  Problem list updated   Follow up in 1 week.  She sees M on 1024 and will see us again next week.  We will do exam with group B strep on that visit.  We will then start to have discussion about induction date and method.    Darius Uribe MD   10/22/2024  15:05 EDT

## 2024-10-24 ENCOUNTER — HOSPITAL ENCOUNTER (OUTPATIENT)
Dept: ULTRASOUND IMAGING | Facility: HOSPITAL | Age: 39
Discharge: HOME | End: 2024-10-24
Admitting: INTERNAL MEDICINE
Payer: COMMERCIAL

## 2024-10-24 ENCOUNTER — OFFICE VISIT (OUTPATIENT)
Dept: OBSTETRICS AND GYNECOLOGY | Facility: CLINIC | Age: 39
End: 2024-10-24
Payer: COMMERCIAL

## 2024-10-24 VITALS
TEMPERATURE: 98.4 F | WEIGHT: 293 LBS | HEART RATE: 104 BPM | DIASTOLIC BLOOD PRESSURE: 78 MMHG | BODY MASS INDEX: 49.02 KG/M2 | SYSTOLIC BLOOD PRESSURE: 132 MMHG

## 2024-10-24 DIAGNOSIS — O16.9 HYPERTENSION AFFECTING PREGNANCY, ANTEPARTUM: Primary | ICD-10-CM

## 2024-10-24 DIAGNOSIS — O09.519 ADVANCED MATERNAL AGE, PRIMIGRAVIDA, ANTEPARTUM: ICD-10-CM

## 2024-10-24 DIAGNOSIS — O24.919 DIABETES MELLITUS COMPLICATING PREGNANCY, UNSPECIFIED TRIMESTER: ICD-10-CM

## 2024-10-24 DIAGNOSIS — O99.210 MATERNAL MORBID OBESITY, ANTEPARTUM: ICD-10-CM

## 2024-10-24 DIAGNOSIS — E66.01 MATERNAL MORBID OBESITY, ANTEPARTUM: ICD-10-CM

## 2024-10-24 LAB — GLUCOSE BLDC GLUCOMTR-MCNC: 94 MG/DL (ref 70–130)

## 2024-10-24 PROCEDURE — 82948 REAGENT STRIP/BLOOD GLUCOSE: CPT

## 2024-10-24 PROCEDURE — 76819 FETAL BIOPHYS PROFIL W/O NST: CPT

## 2024-10-24 NOTE — LETTER
October 24, 2024     Darius Uribe MD  [address]  [facility] [address]  [place] [id number]    Patient: Lavern Rehman   YOB: 1985   Date of Visit: 10/24/2024       Dear Darius Uribe MD    Lavern Rehman was in my office today. Below is a copy of my note.    If you have questions, please do not hesitate to call me. I look forward to following Lavern along with you.         Sincerely,        CELINE Guzmán        CC: No Recipients                            Pt reports that she is doing well and denies vaginal bleeding, cramping, contractions or LOF at this time. Reports active fetal movement. Reviewed when to call OB office or present to L&D for evaluation with symptoms such as decreased fetal movement, vaginal bleeding, LOF or ctxs. Pt verbalized understanding. Denies HA, visual changes or epigastric pain. Denies any additional complaints at time of appointment. Next OB appointment scheduled for 10/29.    Blood sugar logs on chart for MFM to review. Blood sugar taken at time of appointment with a value of 94 noted.     Vitals:    10/24/24 0923   BP: 132/78   Pulse: 104   Temp: 98.4 °F (36.9 °C)          MATERNAL FETAL MEDICINE Consult Note    Dear Dr Darius Uribe MD:    Thank you for your kind referral of Lavern Rehman.  As you know, she is a 39 y.o. G 2 P 0010 at 35 0/7 weeks gestation (Estimated Date of Delivery: 11/28/24). This is a consult.     Her antepartum course is complicated by:  Chronic Hypertension  Type 2 DM  AMA    Aneuploidy Screening: NIPT - Negative    HPI: No contractions, LOF, vaginal bleeding. Normal fetal movement.   She denies headache, vision changes, shortness of breath, acute changes in edema, and RUQ pain.       Review of History:  Past Medical History:   Diagnosis Date   • Adhesive capsulitis of left shoulder 09/08/2022    Formatting of this note might be different from the original.   Added automatically from request for surgery [id number]     • Asthma  02/01/1991   • Congenital cystic kidney disease 05/20/2024   • Diabetes    • TERE (generalized anxiety disorder)    • GERD (gastroesophageal reflux disease)    • HTN (hypertension)    • Hypertension      Past Surgical History:   Procedure Laterality Date   • DILATATION AND CURETTAGE     • KNEE SURGERY     • LAPAROSCOPIC CHOLECYSTECTOMY     • SHOULDER SURGERY       Social History     Socioeconomic History   • Marital status:    Tobacco Use   • Smoking status: Never     Passive exposure: Never   • Smokeless tobacco: Never   Vaping Use   • Vaping status: Former   Substance and Sexual Activity   • Alcohol use: Not Currently   • Drug use: Not Currently   • Sexual activity: Yes     Partners: Male     Birth control/protection: Birth control pill     Family History   Problem Relation Age of Onset   • Hypertension Father    • Breast cancer Maternal Grandmother    • Hypertension Maternal Grandfather    • Ovarian cancer Neg Hx    • Uterine cancer Neg Hx    • Colon cancer Neg Hx       Allergies   Allergen Reactions   • Shellfish-Derived Products Hives     N/v.   • Shellfish Allergy Hives   • Oxycodone-Acetaminophen Hives, Itching and Rash      Current Outpatient Medications on File Prior to Visit   Medication Sig Dispense Refill   • amLODIPine (NORVASC) 10 MG tablet Take 1 tablet by mouth Daily.     • aspirin 81 MG chewable tablet Chew 1 tablet Daily. 60 tablet 3   • Blood Glucose Monitoring Suppl (ONE TOUCH ULTRA 2) w/Device kit      • budesonide-formoterol (SYMBICORT) 80-4.5 MCG/ACT inhaler Inhale 2 puffs 2 (Two) Times a Day.     • ferrous sulfate 325 (65 FE) MG tablet Take 1 tablet by mouth Daily.     • glucose blood test strip Use to check blood sugar 7 times a day. Patient has a One Touch Ultra 2 glucometer. 200 each 10   • glucose blood test strip Use as instructed 200 each 12   • glucose monitor monitoring kit Use to check blood sugars 7 times daily, please dispense compatible lancets and test strips. 1 each 0   •  Insulin Glargine (LANTUS SOLOSTAR) 100 UNIT/ML injection pen Please dispense insulin pen. Variable dosing up to 100 units a day. Starting dose: 35 units AM, 35 units PM. PLEASE PROCESS AS THERAPY CHANGE. (Patient taking differently: Please dispense insulin pen. Variable dosing up to 100 units a day. Current dose: 44 units AM, 44 units PM. PLEASE PROCESS AS THERAPY CHANGE.) 30 mL 6   • Insulin Pen Needle (Pen Needles) 32G X 4 MM misc Use 1 Needle 2 (Two) Times a Day. Use to inject insulin up to 5x daily. 200 each 3   • ketoconazole (NIZORAL) 2 % cream prn (Patient not taking: Reported on 10/22/2024)     • Lancets 30G misc To test glucose 7 times daily 200 each 6   • metFORMIN ER (GLUCOPHAGE-XR) 500 MG 24 hr tablet Take 2 tablets by mouth Daily With Breakfast & Dinner. 120 tablet 8   • nystatin-triamcinolone (MYCOLOG) 607911-8.1 UNIT/GM-% ointment Apply 1 Application topically to the appropriate area as directed 2 (Two) Times a Day. 30 g 3   • OneTouch Delica Lancets 30G misc 1 Lancet by Other route Take As Directed. Use to check blood sugar 7 times a day. 200 each 10   • pantoprazole (PROTONIX) 20 MG EC tablet Take 1 tablet by mouth Daily.     • prenatal vitamins (PRENATAL 27-1) 27-1 MG tablet tablet Take  by mouth Daily.     • PROVENTIL  (90 BASE) MCG/ACT inhaler INHALE 1 PUFF EVERY 4 HOURS AS NEEDED. 6.7 inhaler 0     Current Facility-Administered Medications on File Prior to Visit   Medication Dose Route Frequency Provider Last Rate Last Admin   • ondansetron ODT (ZOFRAN-ODT) disintegrating tablet 4 mg  4 mg Translingual Q6H PRN Debra Lyon APRN            Past obstetric, gynecological, medical, surgical, family and social history reviewed.  Relevant lab work and imaging reviewed.    Review of systems  As above in HPI     Vitals:    10/24/24 0923   BP: 132/78   BP Location: Right arm   Patient Position: Sitting   Pulse: 104   Temp: 98.4 °F (36.9 °C)   TempSrc: Temporal   Weight: (!) 146 kg (322 lb  6.4 oz)     PHYSICAL EXAM   General: No acute distress  Respiratory: No increased work of breathing  Cardiac: regular rate  Abdominal: Gravid, nontender  Extremities: No significant edema  Neuro/psych: Alert and oriented, appropriate mood      ULTRASOUND   Please view full ultrasound note on Imaging tab in ViewPoint.  She was counseled regarding the limitations of ultrasound.     Cephalic Presentation  Anterior placenta  ALBA 19 cm, which is normal  BPP       ASSESSMENT/COUNSELIN y.o. G 2 P 0010 at 35 0/7 weeks gestation (Estimated Date of Delivery: 24).     -Pregnancy  [ X ] stable  [   ] improving [  ] worsening    Diagnoses and all orders for this visit:    1. Hypertension affecting pregnancy, antepartum (Primary)    2. Diabetes mellitus complicating pregnancy, unspecified trimester    3. Advanced maternal age, primigravida, antepartum    4. Maternal morbid obesity, antepartum        ADVANCED MATERNAL AGE  Previously counseled.  NIPT wnl.    Recommend fetal growth surveillance. Start  fetal surveillance with weekly BPP at 32-36 weeks.  Recommend baby ASA, which she is taking.      POLYHYDRAMNIOS, MILD -- resolved  Previously Counseled  ALBA today is 19    CHRONIC HYPERTENSION  Amlodipine 10 mg   Previously counseled.   Serial growths at MFM.  ANFS twice weekly between MFM and OB.  I would recommend a 38 week delivery given CHTN that has previously required medication.  She may require closer to 37 depending on her control closer to delivery.        PREGESTATIONAL DM, TYPE 2  Previously counseled.  Last A1c 2024 6.5  EKG wnl May 2024  Reports normal optho appt in May 2024    Blood sugar logs reviewed and blood sugars are all WNL. No insulin regimen changes made today.     PREPREGNANCY REGIMEN  Metformin 850 mg x 2 with dinner  Actos 45 mg with dinner    CURRENT REGIMEN  Metformin 1000 mg PO BID with meals  Lantus     Fetal ECHO wnl.  Progress Notes by Stuart Galan MD (2024  15:00)         Recommendations:  -ASA 81 mg--continue daily  -normal PIH labs, & 24 hour urine  - Recommend emailing/fax/submitting her log weekly for review; to bring log to all M office visits.  -Continue ADA diet with carbohydrate counting.  -Continue glucose monitoring 6-7 times daily as instructed.  -Continue medications as above  -Ultrasound for fetal growth every 4 weeks  - testing at 32 weeks gestation, twice weekly between MFM and primary OB  -Continue routine prenatal care with primary ob team   -At present goal for delivery is 38 weeks for CHTN and DM    Postpartum  [ ] Ensure transition of care to endocrinology or primary care provider.      Follow-up:  weekly    Thank you for the consult and opportunity to care for this patient.  Please feel free to reach out with any questions or concerns.      I spent 20 minutes caring for this patient on this date of service. This time includes time spent by me in the following activities: preparing for the visit, reviewing tests, obtaining and/or reviewing a separately obtained history, performing a medically appropriate examination and/or evaluation, counseling and educating the patient/family/caregiver and independently interpreting results and communicating that information with the patient/family/caregiver with greater than 50% spent in counseling and coordination of care.     CELINE Lew  Maternal Fetal Medicine-[facility]  Office: [phone]  [email or web address]

## 2024-10-24 NOTE — PROGRESS NOTES
Pt reports that she is doing well and denies vaginal bleeding, cramping, contractions or LOF at this time. Reports active fetal movement. Reviewed when to call OB office or present to L&D for evaluation with symptoms such as decreased fetal movement, vaginal bleeding, LOF or ctxs. Pt verbalized understanding. Denies HA, visual changes or epigastric pain. Denies any additional complaints at time of appointment. Next OB appointment scheduled for 10/29.    Blood sugar logs on chart for MFM to review. Blood sugar taken at time of appointment with a value of 94 noted.     Vitals:    10/24/24 0923   BP: 132/78   Pulse: 104   Temp: 98.4 °F (36.9 °C)

## 2024-10-29 ENCOUNTER — ROUTINE PRENATAL (OUTPATIENT)
Dept: OBSTETRICS AND GYNECOLOGY | Facility: CLINIC | Age: 39
End: 2024-10-29
Payer: COMMERCIAL

## 2024-10-29 VITALS — WEIGHT: 293 LBS | SYSTOLIC BLOOD PRESSURE: 184 MMHG | BODY MASS INDEX: 49.39 KG/M2 | DIASTOLIC BLOOD PRESSURE: 83 MMHG

## 2024-10-29 DIAGNOSIS — O99.210 MATERNAL MORBID OBESITY, ANTEPARTUM: ICD-10-CM

## 2024-10-29 DIAGNOSIS — E66.01 MATERNAL MORBID OBESITY, ANTEPARTUM: ICD-10-CM

## 2024-10-29 DIAGNOSIS — Z3A.35 35 WEEKS GESTATION OF PREGNANCY: Primary | ICD-10-CM

## 2024-10-29 DIAGNOSIS — Z34.83 PRENATAL CARE, SUBSEQUENT PREGNANCY IN THIRD TRIMESTER: ICD-10-CM

## 2024-10-29 DIAGNOSIS — O24.919 DIABETES MELLITUS COMPLICATING PREGNANCY, UNSPECIFIED TRIMESTER: ICD-10-CM

## 2024-10-29 DIAGNOSIS — O09.519 ADVANCED MATERNAL AGE, PRIMIGRAVIDA, ANTEPARTUM: ICD-10-CM

## 2024-10-29 DIAGNOSIS — O16.9 HYPERTENSION AFFECTING PREGNANCY, ANTEPARTUM: ICD-10-CM

## 2024-10-29 LAB
GLUCOSE UR STRIP-MCNC: NEGATIVE MG/DL
PROT UR STRIP-MCNC: ABNORMAL MG/DL

## 2024-10-29 NOTE — PROGRESS NOTES
OB follow up     Lavern Rehman is a 39 y.o.  35w5d being seen today for her obstetrical visit.  Patient reports no complaints. Fetal movement: normal.  Biophysical profile was  today  She states that all of her blood pressures that she takes at home have been normal.    Her prenatal care is complicated by (and status): Obesity, chronic hypertension, type 2 diabetes, advanced maternal age,    Review of Systems  Cramping/contractions : Negative  Vaginal bleeding: Negative  Fetal movement is normal    BP (!) 184/83   Wt (!) 147 kg (324 lb 12.8 oz)   LMP 2024 (Exact Date)   BMI 49.39 kg/m²     FHT: 154 BPM   Uterine Size: size equals dates   Repeat blood pressure after the visit [id number]    Assessment    Diagnoses and all orders for this visit:    1. 35 weeks gestation of pregnancy (Primary)  -     POC Urinalysis Dipstick    2. Prenatal care, subsequent pregnancy in third trimester    3. Diabetes mellitus complicating pregnancy, unspecified trimester    4. Hypertension affecting pregnancy, antepartum    5. Advanced maternal age, primigravida, antepartum    6. Maternal morbid obesity, antepartum        1) pregnancy at 35w5d         Plan    Reviewed this stage of pregnancy  Problem list updated   Follow up in 1 week.  I discussed with her the possible change in blood pressure and that we will keep a close eye on that.  She is seeing maternal-fetal medicine in 48 hours.  We will begin having discussions about induction timing at the time of our next visit.  Currently, the recommendation is 38 weeks but possibly moving closer to 37 weeks depending on her glucose and hypertension control.    Darius Uribe MD   10/29/2024  15:01 EDT

## 2024-10-31 ENCOUNTER — HOSPITAL ENCOUNTER (OUTPATIENT)
Dept: ULTRASOUND IMAGING | Facility: HOSPITAL | Age: 39
Discharge: HOME | End: 2024-10-31
Admitting: INTERNAL MEDICINE
Payer: COMMERCIAL

## 2024-10-31 ENCOUNTER — OFFICE VISIT (OUTPATIENT)
Dept: OBSTETRICS AND GYNECOLOGY | Facility: CLINIC | Age: 39
End: 2024-10-31
Payer: COMMERCIAL

## 2024-10-31 ENCOUNTER — HOSPITAL ENCOUNTER (INPATIENT)
Facility: HOSPITAL | Age: 39
LOS: 6 days | Discharge: HOME | End: 2024-11-06
Attending: OBSTETRICS & GYNECOLOGY | Admitting: OBSTETRICS & GYNECOLOGY
Payer: COMMERCIAL

## 2024-10-31 VITALS
DIASTOLIC BLOOD PRESSURE: 80 MMHG | WEIGHT: 293 LBS | SYSTOLIC BLOOD PRESSURE: 166 MMHG | TEMPERATURE: 99.1 F | BODY MASS INDEX: 44.41 KG/M2 | OXYGEN SATURATION: 99 % | HEART RATE: 97 BPM | HEIGHT: 68 IN

## 2024-10-31 DIAGNOSIS — O11.9 CHRONIC HYPERTENSION WITH SUPERIMPOSED PREECLAMPSIA: Primary | ICD-10-CM

## 2024-10-31 DIAGNOSIS — O24.919 DIABETES MELLITUS COMPLICATING PREGNANCY, UNSPECIFIED TRIMESTER: ICD-10-CM

## 2024-10-31 DIAGNOSIS — Z3A.36 36 WEEKS GESTATION OF PREGNANCY: ICD-10-CM

## 2024-10-31 DIAGNOSIS — O09.519 ADVANCED MATERNAL AGE, PRIMIGRAVIDA, ANTEPARTUM: ICD-10-CM

## 2024-10-31 PROBLEM — O24.113 TYPE 2 DIABETES MELLITUS IN PREGNANCY, THIRD TRIMESTER: Status: ACTIVE | Noted: 2024-10-31

## 2024-10-31 PROBLEM — Z34.90 PREGNANCY: Status: ACTIVE | Noted: 2024-10-31

## 2024-10-31 PROBLEM — O09.513 PRIMIGRAVIDA OF ADVANCED MATERNAL AGE IN THIRD TRIMESTER: Status: ACTIVE | Noted: 2024-10-31

## 2024-10-31 LAB
ABO GROUP BLD: NORMAL
ALBUMIN SERPL-MCNC: 3 G/DL (ref 3.5–5.2)
ALBUMIN/GLOB SERPL: 0.9 G/DL
ALP SERPL-CCNC: 116 U/L (ref 39–117)
ALT SERPL W P-5'-P-CCNC: 9 U/L (ref 1–33)
AMPHET+METHAMPHET UR QL: NEGATIVE
ANION GAP SERPL CALCULATED.3IONS-SCNC: 11.4 MMOL/L (ref 5–15)
AST SERPL-CCNC: 10 U/L (ref 1–32)
BARBITURATES UR QL SCN: NEGATIVE
BASOPHILS # BLD AUTO: 0.04 10*3/MM3 (ref 0–0.2)
BASOPHILS NFR BLD AUTO: 0.3 % (ref 0–1.5)
BENZODIAZ UR QL SCN: NEGATIVE
BILIRUB SERPL-MCNC: <0.2 MG/DL (ref 0–1.2)
BLD GP AB SCN SERPL QL: NEGATIVE
BUN SERPL-MCNC: 9 MG/DL (ref 6–20)
BUN/CREAT SERPL: 11.4 (ref 7–25)
CALCIUM SPEC-SCNC: 8.6 MG/DL (ref 8.6–10.5)
CANNABINOIDS SERPL QL: NEGATIVE
CHLORIDE SERPL-SCNC: 105 MMOL/L (ref 98–107)
CO2 SERPL-SCNC: 20.6 MMOL/L (ref 22–29)
COCAINE UR QL: NEGATIVE
CREAT SERPL-MCNC: 0.79 MG/DL (ref 0.57–1)
CREAT UR-MCNC: 41.1 MG/DL
DEPRECATED RDW RBC AUTO: 39.5 FL (ref 37–54)
EGFRCR SERPLBLD CKD-EPI 2021: 98.3 ML/MIN/1.73
EOSINOPHIL # BLD AUTO: 0.23 10*3/MM3 (ref 0–0.4)
EOSINOPHIL NFR BLD AUTO: 1.8 % (ref 0.3–6.2)
ERYTHROCYTE [DISTWIDTH] IN BLOOD BY AUTOMATED COUNT: 12.9 % (ref 12.3–15.4)
EXPIRATION DATE: NORMAL
FENTANYL UR-MCNC: NEGATIVE NG/ML
GLOBULIN UR ELPH-MCNC: 3.5 GM/DL
GLUCOSE BLDC GLUCOMTR-MCNC: 149 MG/DL (ref 70–130)
GLUCOSE BLDC GLUCOMTR-MCNC: 65 MG/DL (ref 70–130)
GLUCOSE BLDC GLUCOMTR-MCNC: 67 MG/DL (ref 70–130)
GLUCOSE BLDC GLUCOMTR-MCNC: 71 MG/DL (ref 70–130)
GLUCOSE BLDC GLUCOMTR-MCNC: 93 MG/DL (ref 70–130)
GLUCOSE SERPL-MCNC: 60 MG/DL (ref 65–99)
GP B STREP DNA SPEC QL NAA+PROBE: NEGATIVE
HCT VFR BLD AUTO: 37.4 % (ref 34–46.6)
HGB BLD-MCNC: 12.4 G/DL (ref 12–15.9)
IMM GRANULOCYTES # BLD AUTO: 0.2 10*3/MM3 (ref 0–0.05)
IMM GRANULOCYTES NFR BLD AUTO: 1.6 % (ref 0–0.5)
LYMPHOCYTES # BLD AUTO: 2.35 10*3/MM3 (ref 0.7–3.1)
LYMPHOCYTES NFR BLD AUTO: 18.5 % (ref 19.6–45.3)
Lab: NORMAL
MCH RBC QN AUTO: 28.2 PG (ref 26.6–33)
MCHC RBC AUTO-ENTMCNC: 33.2 G/DL (ref 31.5–35.7)
MCV RBC AUTO: 85 FL (ref 79–97)
METHADONE UR QL SCN: NEGATIVE
MONOCYTES # BLD AUTO: 0.7 10*3/MM3 (ref 0.1–0.9)
MONOCYTES NFR BLD AUTO: 5.5 % (ref 5–12)
NEUTROPHILS NFR BLD AUTO: 72.3 % (ref 42.7–76)
NEUTROPHILS NFR BLD AUTO: 9.15 10*3/MM3 (ref 1.7–7)
NRBC BLD AUTO-RTO: 0 /100 WBC (ref 0–0.2)
OPIATES UR QL: NEGATIVE
OXYCODONE UR QL SCN: NEGATIVE
PLATELET # BLD AUTO: 339 10*3/MM3 (ref 140–450)
PMV BLD AUTO: 9.6 FL (ref 6–12)
POTASSIUM SERPL-SCNC: 3.6 MMOL/L (ref 3.5–5.2)
PROT ?TM UR-MCNC: 39.5 MG/DL
PROT SERPL-MCNC: 6.5 G/DL (ref 6–8.5)
PROT UR STRIP-MCNC: NEGATIVE MG/DL
PROT/CREAT UR: 961.1 MG/G CREA (ref 0–200)
RBC # BLD AUTO: 4.4 10*6/MM3 (ref 3.77–5.28)
RH BLD: POSITIVE
SODIUM SERPL-SCNC: 137 MMOL/L (ref 136–145)
T&S EXPIRATION DATE: NORMAL
TREPONEMA PALLIDUM IGG+IGM AB [PRESENCE] IN SERUM OR PLASMA BY IMMUNOASSAY: NORMAL
WBC NRBC COR # BLD AUTO: 12.67 10*3/MM3 (ref 3.4–10.8)

## 2024-10-31 PROCEDURE — 80307 DRUG TEST PRSMV CHEM ANLYZR: CPT | Performed by: OBSTETRICS & GYNECOLOGY

## 2024-10-31 PROCEDURE — 86900 BLOOD TYPING SEROLOGIC ABO: CPT | Performed by: OBSTETRICS & GYNECOLOGY

## 2024-10-31 PROCEDURE — 80053 COMPREHEN METABOLIC PANEL: CPT | Performed by: OBSTETRICS & GYNECOLOGY

## 2024-10-31 PROCEDURE — 76819 FETAL BIOPHYS PROFIL W/O NST: CPT

## 2024-10-31 PROCEDURE — 82948 REAGENT STRIP/BLOOD GLUCOSE: CPT

## 2024-10-31 PROCEDURE — 3E0P7VZ INTRODUCTION OF HORMONE INTO FEMALE REPRODUCTIVE, VIA NATURAL OR ARTIFICIAL OPENING: ICD-10-PCS | Performed by: OBSTETRICS & GYNECOLOGY

## 2024-10-31 PROCEDURE — 25810000003 LACTATED RINGERS PER 1000 ML: Performed by: OBSTETRICS & GYNECOLOGY

## 2024-10-31 PROCEDURE — 86850 RBC ANTIBODY SCREEN: CPT | Performed by: OBSTETRICS & GYNECOLOGY

## 2024-10-31 PROCEDURE — 85025 COMPLETE CBC W/AUTO DIFF WBC: CPT | Performed by: OBSTETRICS & GYNECOLOGY

## 2024-10-31 PROCEDURE — 86780 TREPONEMA PALLIDUM: CPT | Performed by: OBSTETRICS & GYNECOLOGY

## 2024-10-31 PROCEDURE — 86901 BLOOD TYPING SEROLOGIC RH(D): CPT | Performed by: OBSTETRICS & GYNECOLOGY

## 2024-10-31 PROCEDURE — 84156 ASSAY OF PROTEIN URINE: CPT | Performed by: OBSTETRICS & GYNECOLOGY

## 2024-10-31 PROCEDURE — 25010000002 LABETALOL 5 MG/ML SOLUTION: Performed by: STUDENT IN AN ORGANIZED HEALTH CARE EDUCATION/TRAINING PROGRAM

## 2024-10-31 PROCEDURE — 3E0DXGC INTRODUCTION OF OTHER THERAPEUTIC SUBSTANCE INTO MOUTH AND PHARYNX, EXTERNAL APPROACH: ICD-10-PCS | Performed by: OBSTETRICS & GYNECOLOGY

## 2024-10-31 PROCEDURE — 25010000002 MAGNESIUM SULFATE 20 GM/500ML SOLUTION: Performed by: OBSTETRICS & GYNECOLOGY

## 2024-10-31 PROCEDURE — 82570 ASSAY OF URINE CREATININE: CPT | Performed by: OBSTETRICS & GYNECOLOGY

## 2024-10-31 PROCEDURE — 81002 URINALYSIS NONAUTO W/O SCOPE: CPT | Performed by: OBSTETRICS & GYNECOLOGY

## 2024-10-31 RX ORDER — MORPHINE SULFATE 2 MG/ML
2 INJECTION, SOLUTION INTRAMUSCULAR; INTRAVENOUS EVERY 4 HOURS PRN
Status: DISCONTINUED | OUTPATIENT
Start: 2024-10-31 | End: 2024-11-02

## 2024-10-31 RX ORDER — HYDRALAZINE HYDROCHLORIDE 20 MG/ML
5-10 INJECTION INTRAMUSCULAR; INTRAVENOUS
Status: DISCONTINUED | OUTPATIENT
Start: 2024-10-31 | End: 2024-11-02

## 2024-10-31 RX ORDER — TERBUTALINE SULFATE 1 MG/ML
0.25 INJECTION, SOLUTION SUBCUTANEOUS AS NEEDED
Status: DISCONTINUED | OUTPATIENT
Start: 2024-10-31 | End: 2024-11-02

## 2024-10-31 RX ORDER — MAGNESIUM SULFATE HEPTAHYDRATE 40 MG/ML
2 INJECTION, SOLUTION INTRAVENOUS CONTINUOUS
Status: DISCONTINUED | OUTPATIENT
Start: 2024-10-31 | End: 2024-11-02

## 2024-10-31 RX ORDER — FAMOTIDINE 10 MG/ML
20 INJECTION, SOLUTION INTRAVENOUS 2 TIMES DAILY PRN
Status: DISCONTINUED | OUTPATIENT
Start: 2024-10-31 | End: 2024-11-02

## 2024-10-31 RX ORDER — MAGNESIUM CARB/ALUMINUM HYDROX 105-160MG
30 TABLET,CHEWABLE ORAL ONCE AS NEEDED
Status: DISCONTINUED | OUTPATIENT
Start: 2024-10-31 | End: 2024-11-02

## 2024-10-31 RX ORDER — ACETAMINOPHEN 500 MG
1000 TABLET ORAL ONCE
Status: COMPLETED | OUTPATIENT
Start: 2024-10-31 | End: 2024-10-31

## 2024-10-31 RX ORDER — FAMOTIDINE 20 MG/1
20 TABLET, FILM COATED ORAL 2 TIMES DAILY PRN
Status: DISCONTINUED | OUTPATIENT
Start: 2024-10-31 | End: 2024-11-02

## 2024-10-31 RX ORDER — SODIUM CHLORIDE 9 MG/ML
40 INJECTION, SOLUTION INTRAVENOUS AS NEEDED
Status: DISCONTINUED | OUTPATIENT
Start: 2024-10-31 | End: 2024-11-02

## 2024-10-31 RX ORDER — ONDANSETRON 2 MG/ML
4 INJECTION INTRAMUSCULAR; INTRAVENOUS EVERY 6 HOURS PRN
Status: DISCONTINUED | OUTPATIENT
Start: 2024-10-31 | End: 2024-11-02

## 2024-10-31 RX ORDER — MISOPROSTOL 100 MCG
25 TABLET ORAL EVERY 4 HOURS
Status: COMPLETED | OUTPATIENT
Start: 2024-10-31 | End: 2024-11-01

## 2024-10-31 RX ORDER — SODIUM CHLORIDE 0.9 % (FLUSH) 0.9 %
10 SYRINGE (ML) INJECTION AS NEEDED
Status: DISCONTINUED | OUTPATIENT
Start: 2024-10-31 | End: 2024-11-02

## 2024-10-31 RX ORDER — NIFEDIPINE 10 MG/1
10-20 CAPSULE ORAL
Status: DISCONTINUED | OUTPATIENT
Start: 2024-10-31 | End: 2024-11-02

## 2024-10-31 RX ORDER — ACETAMINOPHEN 325 MG/1
650 TABLET ORAL EVERY 4 HOURS PRN
Status: DISCONTINUED | OUTPATIENT
Start: 2024-10-31 | End: 2024-11-02

## 2024-10-31 RX ORDER — LIDOCAINE HYDROCHLORIDE 10 MG/ML
0.5 INJECTION, SOLUTION INFILTRATION; PERINEURAL ONCE AS NEEDED
Status: DISCONTINUED | OUTPATIENT
Start: 2024-10-31 | End: 2024-11-02

## 2024-10-31 RX ORDER — SODIUM CHLORIDE 0.9 % (FLUSH) 0.9 %
10 SYRINGE (ML) INJECTION EVERY 12 HOURS SCHEDULED
Status: DISCONTINUED | OUTPATIENT
Start: 2024-10-31 | End: 2024-11-02

## 2024-10-31 RX ORDER — NALOXONE HCL 0.4 MG/ML
0.4 VIAL (ML) INJECTION
Status: DISCONTINUED | OUTPATIENT
Start: 2024-10-31 | End: 2024-11-02

## 2024-10-31 RX ORDER — CALCIUM GLUCONATE 94 MG/ML
1 INJECTION, SOLUTION INTRAVENOUS ONCE AS NEEDED
Status: DISCONTINUED | OUTPATIENT
Start: 2024-10-31 | End: 2024-11-02

## 2024-10-31 RX ORDER — ONDANSETRON 4 MG/1
4 TABLET, ORALLY DISINTEGRATING ORAL EVERY 6 HOURS PRN
Status: DISCONTINUED | OUTPATIENT
Start: 2024-10-31 | End: 2024-11-02

## 2024-10-31 RX ORDER — AMLODIPINE BESYLATE 10 MG/1
10 TABLET ORAL NIGHTLY
Status: DISCONTINUED | OUTPATIENT
Start: 2024-10-31 | End: 2024-11-02

## 2024-10-31 RX ORDER — SODIUM CHLORIDE, SODIUM LACTATE, POTASSIUM CHLORIDE, CALCIUM CHLORIDE 600; 310; 30; 20 MG/100ML; MG/100ML; MG/100ML; MG/100ML
75 INJECTION, SOLUTION INTRAVENOUS CONTINUOUS
Status: DISCONTINUED | OUTPATIENT
Start: 2024-10-31 | End: 2024-11-02

## 2024-10-31 RX ORDER — LABETALOL HYDROCHLORIDE 5 MG/ML
20-80 INJECTION, SOLUTION INTRAVENOUS
Status: DISCONTINUED | OUTPATIENT
Start: 2024-10-31 | End: 2024-11-02

## 2024-10-31 RX ADMIN — LABETALOL HYDROCHLORIDE 20 MG: 5 INJECTION, SOLUTION INTRAVENOUS at 15:49

## 2024-10-31 RX ADMIN — Medication 25 MCG: at 20:44

## 2024-10-31 RX ADMIN — ACETAMINOPHEN 1000 MG: 500 TABLET, FILM COATED ORAL at 15:58

## 2024-10-31 RX ADMIN — AMLODIPINE BESYLATE 10 MG: 10 TABLET ORAL at 21:01

## 2024-10-31 RX ADMIN — Medication 25 MCG: at 16:49

## 2024-10-31 RX ADMIN — MAGNESIUM SULFATE IN WATER 2 G/HR: 20 INJECTION, SOLUTION INTRAVENOUS at 23:28

## 2024-10-31 RX ADMIN — SODIUM CHLORIDE, POTASSIUM CHLORIDE, SODIUM LACTATE AND CALCIUM CHLORIDE 75 ML/HR: 600; 310; 30; 20 INJECTION, SOLUTION INTRAVENOUS at 15:48

## 2024-10-31 NOTE — PROGRESS NOTES
MATERNAL FETAL MEDICINE Consult Note    Dear Dr Darius Uribe MD:    Thank you for your kind referral of Lavern Rehman.  As you know, she is a 39 y.o. G 2 P 0010 at 36 0/7 weeks gestation (Estimated Date of Delivery: 11/28/24). This is a consult.     Her antepartum course is complicated by:  Chronic Hypertension  Type 2 DM  AMA    Aneuploidy Screening: NIPT - Negative    HPI: No contractions, LOF, vaginal bleeding. Normal fetal movement.   She denies headache, vision changes, shortness of breath, acute changes in edema, and RUQ pain.       Review of History:  Past Medical History:   Diagnosis Date    Adhesive capsulitis of left shoulder 09/08/2022    Formatting of this note might be different from the original.   Added automatically from request for surgery [id number]      Asthma 02/01/1991    Congenital cystic kidney disease 05/20/2024    Diabetes     TERE (generalized anxiety disorder)     GERD (gastroesophageal reflux disease)     HTN (hypertension)     Hypertension      Past Surgical History:   Procedure Laterality Date    DILATATION AND CURETTAGE      KNEE SURGERY      LAPAROSCOPIC CHOLECYSTECTOMY      SHOULDER SURGERY       Social History     Socioeconomic History    Marital status:    Tobacco Use    Smoking status: Never     Passive exposure: Never    Smokeless tobacco: Never   Vaping Use    Vaping status: Former   Substance and Sexual Activity    Alcohol use: Not Currently    Drug use: Not Currently    Sexual activity: Yes     Partners: Male     Birth control/protection: Birth control pill     Family History   Problem Relation Age of Onset    Hypertension Father     Breast cancer Maternal Grandmother     Hypertension Maternal Grandfather     Ovarian cancer Neg Hx     Uterine cancer Neg Hx     Colon cancer Neg Hx       Allergies   Allergen Reactions    Shellfish-Derived Products Hives     N/v.    Shellfish Allergy Hives    Oxycodone-Acetaminophen Hives, Itching and Rash      Current Outpatient  Medications on File Prior to Visit   Medication Sig Dispense Refill    amLODIPine (NORVASC) 10 MG tablet Take 1 tablet by mouth Daily.      aspirin 81 MG chewable tablet Chew 1 tablet Daily. 60 tablet 3    Blood Glucose Monitoring Suppl (ONE TOUCH ULTRA 2) w/Device kit       budesonide-formoterol (SYMBICORT) 80-4.5 MCG/ACT inhaler Inhale 2 puffs 2 (Two) Times a Day.      ferrous sulfate 325 (65 FE) MG tablet Take 1 tablet by mouth Daily.      glucose blood test strip Use to check blood sugar 7 times a day. Patient has a One Touch Ultra 2 glucometer. 200 each 10    glucose monitor monitoring kit Use to check blood sugars 7 times daily, please dispense compatible lancets and test strips. 1 each 0    Insulin Glargine (LANTUS SOLOSTAR) 100 UNIT/ML injection pen Please dispense insulin pen. Variable dosing up to 100 units a day. Starting dose: 35 units AM, 35 units PM. PLEASE PROCESS AS THERAPY CHANGE. (Patient taking differently: Please dispense insulin pen. Variable dosing up to 100 units a day. Current dose: 44 units AM, 44 units PM. PLEASE PROCESS AS THERAPY CHANGE.) 30 mL 6    Insulin Pen Needle (Pen Needles) 32G X 4 MM misc Use 1 Needle 2 (Two) Times a Day. Use to inject insulin up to 5x daily. 200 each 3    Lancets 30G misc To test glucose 7 times daily 200 each 6    metFORMIN ER (GLUCOPHAGE-XR) 500 MG 24 hr tablet Take 2 tablets by mouth Daily With Breakfast & Dinner. 120 tablet 8    nystatin-triamcinolone (MYCOLOG) 483332-9.1 UNIT/GM-% ointment Apply 1 Application topically to the appropriate area as directed 2 (Two) Times a Day. 30 g 3    OneTouch Delica Lancets 30G misc 1 Lancet by Other route Take As Directed. Use to check blood sugar 7 times a day. 200 each 10    pantoprazole (PROTONIX) 20 MG EC tablet Take 1 tablet by mouth Daily.      prenatal vitamins (PRENATAL 27-1) 27-1 MG tablet tablet Take  by mouth Daily.      PROVENTIL  (90 BASE) MCG/ACT inhaler INHALE 1 PUFF EVERY 4 HOURS AS NEEDED. 6.7  "inhaler 0     Current Facility-Administered Medications on File Prior to Visit   Medication Dose Route Frequency Provider Last Rate Last Admin    ondansetron ODT (ZOFRAN-ODT) disintegrating tablet 4 mg  4 mg Translingual Q6H PRN Debra Lyon APRN            Past obstetric, gynecological, medical, surgical, family and social history reviewed.  Relevant lab work and imaging reviewed.    Review of systems  As above in HPI     Vitals:    10/31/24 1349 10/31/24 1355 10/31/24 1437 10/31/24 1439   BP: 162/80 164/76 168/78 166/80   BP Location: Right arm Left arm Right arm Left arm   Patient Position: Sitting Sitting Sitting Sitting   Pulse: 97      Temp: 99.1 °F (37.3 °C)      TempSrc: Temporal      SpO2: 99%      Weight: (!) 148 kg (326 lb)      Height: 172.7 cm (68\")        PHYSICAL EXAM   General: No acute distress  Respiratory: No increased work of breathing  Cardiac: regular rate  Abdominal: Gravid, nontender  Extremities: No significant edema  Neuro/psych: Alert and oriented, appropriate mood      ULTRASOUND   Please view full ultrasound note on Imaging tab in ViewPoint.  She was counseled regarding the limitations of ultrasound.   Cephalic presentation  Anterior placenta  ALBA 18.3 cm which is normal  BPP       ASSESSMENT/COUNSELIN y.o. G 2 P 0010 at 36 0/7 weeks gestation (Estimated Date of Delivery: 24).     -Pregnancy  [ X ] stable  [   ] improving [  ] worsening    Diagnoses and all orders for this visit:    1. Chronic hypertension with superimposed preeclampsia (Primary)    2. Advanced maternal age, primigravida, antepartum    3. Diabetes mellitus complicating pregnancy, unspecified trimester    4. 36 weeks gestation of pregnancy          ADVANCED MATERNAL AGE  Previously counseled.  NIPT wnl.    Recommend fetal growth surveillance. Start  fetal surveillance with weekly BPP at 32-36 weeks.  Recommend baby ASA, which she is taking.      POLYHYDRAMNIOS, MILD -- resolved  Previously " Counseled  ALBA today is 19    CHRONIC HYPERTENSION  Amlodipine 10 mg   Previously counseled.   Serial growths at [facility].  ANFS twice weekly between M and OB.    Severe range blood pressures are noted today in office.  Previously had a severe range on 10/29.  Given multiple severe ranges beyond 34 weeks with previous appropriate control, suspect preeclampsia with severe features.    Recommend admission to labor and delivery for delivery.  We reviewed the need for magnesium for seizure prophylaxis.  Additionally, we discussed about the potential need of IV antihypertensives to maintain blood pressure in the normal range.  she is currently on the maximum dose of amlodipine.  Do not add Procardia if additional medications are required.  If additional medication is needed for long-acting coverage, consider labetalol.  Given pregestational diabetes and late  period, would not proceed with BTMS.      PREGESTATIONAL DM, TYPE 2  Previously counseled.  Last A1c 2024 6.5  EKG wnl May 2024  Reports normal optho appt in May 2024    Blood sugar logs reviewed and blood sugars are all WNL. No insulin regimen changes made today.     PREPREGNANCY REGIMEN  Metformin 850 mg x 2 with dinner  Actos 45 mg with dinner    CURRENT REGIMEN  Metformin 1000 mg PO BID with meals  Lantus 44/44    Fetal ECHO wnl.  Progress Notes by Stuart Galan MD (2024 15:00)     Postpartum recommendations: Half Lantus to .  If there are lows or she does not tolerate can resume prepregnancy regimen.     Recommendations:  -ASA 81 mg--continue daily  -normal PIH labs, & 24 hour urine  - Recommend emailing/fax/submitting her log weekly for review; to bring log to all [facility] office visits.  -Continue ADA diet with carbohydrate counting.  -Continue glucose monitoring 6-7 times daily as instructed.  -Continue medications as above  -Ultrasound for fetal growth every 4 weeks  - testing at 32 weeks gestation, twice weekly between [facility] and primary  OB  -Continue routine prenatal care with primary ob team   -At present goal for delivery is 38 weeks for CHTN and DM    Postpartum  [ ] Ensure transition of care to endocrinology or primary care provider.      Follow-up:  admit for delivery. Dr. Mallory and labor and delivery notified of admission.     Thank you for the consult and opportunity to care for this patient.  Please feel free to reach out with any questions or concerns.    I spent 30 minutes caring for this patient on this date of service. This time includes time spent by me in the following activities: preparing for the visit, reviewing tests, obtaining and/or reviewing a separately obtained history, performing a medically appropriate examination and/or evaluation, counseling and educating the patient/family/caregiver and independently interpreting results and communicating that information with the patient/family/caregiver with greater than 50% spent in counseling and coordination of care.         I spent 5 minutes on the separately reported service of US imaging not included in the time used to support the E/M service also reported today.      Leah Martel MD   Maternal Fetal Medicine-[facility]  Office: [phone]

## 2024-10-31 NOTE — LETTER
October 31, 2024     Darius Uribe MD  [address]  [facility] [address]  [place] [id number]    Patient: Lavern Rehman   YOB: 1985   Date of Visit: 10/31/2024       Dear Darius Uribe MD    Lavern Rehman was in my office today. Below is a copy of my note.    If you have questions, please do not hesitate to call me. I look forward to following Lavern along with you.         Sincerely,        Leah Martel MD        CC: No Recipients    Pt reports that she is doing well and denies vaginal bleeding, cramping, contractions or LOF at this time. Reports active fetal movement. Reviewed when to call OB office or present to L&D for evaluation with symptoms such as decreased fetal movement, vaginal bleeding, LOF or ctxs. Pt verbalized understanding. Denies visual changes or epigastric pain. Notes irregular HA and nausea. Denies any additional complaints at time of appointment. Next OB appointment scheduled for 11/5.    Blood sugar at time of appointment is 93. Logs on chart for MFM to review.     Vitals:    10/31/24 1355   BP: 164/76   Pulse:    Temp:    SpO2:          MATERNAL FETAL MEDICINE Consult Note    Dear Dr Darius Uribe MD:    Thank you for your kind referral of Lavern Rehman.  As you know, she is a 39 y.o. G 2 P 0010 at 36 0/7 weeks gestation (Estimated Date of Delivery: 11/28/24). This is a consult.     Her antepartum course is complicated by:  Chronic Hypertension  Type 2 DM  AMA    Aneuploidy Screening: NIPT - Negative    HPI: No contractions, LOF, vaginal bleeding. Normal fetal movement.   She denies headache, vision changes, shortness of breath, acute changes in edema, and RUQ pain.       Review of History:  Past Medical History:   Diagnosis Date   • Adhesive capsulitis of left shoulder 09/08/2022    Formatting of this note might be different from the original.   Added automatically from request for surgery [id number]     • Asthma 02/01/1991   • Congenital cystic kidney disease  05/20/2024   • Diabetes    • TERE (generalized anxiety disorder)    • GERD (gastroesophageal reflux disease)    • HTN (hypertension)    • Hypertension      Past Surgical History:   Procedure Laterality Date   • DILATATION AND CURETTAGE     • KNEE SURGERY     • LAPAROSCOPIC CHOLECYSTECTOMY     • SHOULDER SURGERY       Social History     Socioeconomic History   • Marital status:    Tobacco Use   • Smoking status: Never     Passive exposure: Never   • Smokeless tobacco: Never   Vaping Use   • Vaping status: Former   Substance and Sexual Activity   • Alcohol use: Not Currently   • Drug use: Not Currently   • Sexual activity: Yes     Partners: Male     Birth control/protection: Birth control pill     Family History   Problem Relation Age of Onset   • Hypertension Father    • Breast cancer Maternal Grandmother    • Hypertension Maternal Grandfather    • Ovarian cancer Neg Hx    • Uterine cancer Neg Hx    • Colon cancer Neg Hx       Allergies   Allergen Reactions   • Shellfish-Derived Products Hives     N/v.   • Shellfish Allergy Hives   • Oxycodone-Acetaminophen Hives, Itching and Rash      Current Outpatient Medications on File Prior to Visit   Medication Sig Dispense Refill   • amLODIPine (NORVASC) 10 MG tablet Take 1 tablet by mouth Daily.     • aspirin 81 MG chewable tablet Chew 1 tablet Daily. 60 tablet 3   • Blood Glucose Monitoring Suppl (ONE TOUCH ULTRA 2) w/Device kit      • budesonide-formoterol (SYMBICORT) 80-4.5 MCG/ACT inhaler Inhale 2 puffs 2 (Two) Times a Day.     • ferrous sulfate 325 (65 FE) MG tablet Take 1 tablet by mouth Daily.     • glucose blood test strip Use to check blood sugar 7 times a day. Patient has a One Touch Ultra 2 glucometer. 200 each 10   • glucose monitor monitoring kit Use to check blood sugars 7 times daily, please dispense compatible lancets and test strips. 1 each 0   • Insulin Glargine (LANTUS SOLOSTAR) 100 UNIT/ML injection pen Please dispense insulin pen. Variable dosing  "up to 100 units a day. Starting dose: 35 units AM, 35 units PM. PLEASE PROCESS AS THERAPY CHANGE. (Patient taking differently: Please dispense insulin pen. Variable dosing up to 100 units a day. Current dose: 44 units AM, 44 units PM. PLEASE PROCESS AS THERAPY CHANGE.) 30 mL 6   • Insulin Pen Needle (Pen Needles) 32G X 4 MM misc Use 1 Needle 2 (Two) Times a Day. Use to inject insulin up to 5x daily. 200 each 3   • Lancets 30G misc To test glucose 7 times daily 200 each 6   • metFORMIN ER (GLUCOPHAGE-XR) 500 MG 24 hr tablet Take 2 tablets by mouth Daily With Breakfast & Dinner. 120 tablet 8   • nystatin-triamcinolone (MYCOLOG) 728220-7.1 UNIT/GM-% ointment Apply 1 Application topically to the appropriate area as directed 2 (Two) Times a Day. 30 g 3   • OneTouch Delica Lancets 30G misc 1 Lancet by Other route Take As Directed. Use to check blood sugar 7 times a day. 200 each 10   • pantoprazole (PROTONIX) 20 MG EC tablet Take 1 tablet by mouth Daily.     • prenatal vitamins (PRENATAL 27-1) 27-1 MG tablet tablet Take  by mouth Daily.     • PROVENTIL  (90 BASE) MCG/ACT inhaler INHALE 1 PUFF EVERY 4 HOURS AS NEEDED. 6.7 inhaler 0     Current Facility-Administered Medications on File Prior to Visit   Medication Dose Route Frequency Provider Last Rate Last Admin   • ondansetron ODT (ZOFRAN-ODT) disintegrating tablet 4 mg  4 mg Translingual Q6H PRN Debra Lyon, CELINE            Past obstetric, gynecological, medical, surgical, family and social history reviewed.  Relevant lab work and imaging reviewed.    Review of systems  As above in HPI     Vitals:    10/31/24 1349 10/31/24 1355 10/31/24 1437 10/31/24 1439   BP: 162/80 164/76 168/78 166/80   BP Location: Right arm Left arm Right arm Left arm   Patient Position: Sitting Sitting Sitting Sitting   Pulse: 97      Temp: 99.1 °F (37.3 °C)      TempSrc: Temporal      SpO2: 99%      Weight: (!) 148 kg (326 lb)      Height: 172.7 cm (68\")        PHYSICAL EXAM "   General: No acute distress  Respiratory: No increased work of breathing  Cardiac: regular rate  Abdominal: Gravid, nontender  Extremities: No significant edema  Neuro/psych: Alert and oriented, appropriate mood      ULTRASOUND   Please view full ultrasound note on Imaging tab in ViewPoint.  She was counseled regarding the limitations of ultrasound.   Cephalic presentation  Anterior placenta  ALBA 18.3 cm which is normal  BPP       ASSESSMENT/COUNSELIN y.o. G 2 P 0010 at 36 0/7 weeks gestation (Estimated Date of Delivery: 24).     -Pregnancy  [ X ] stable  [   ] improving [  ] worsening    Diagnoses and all orders for this visit:    1. Chronic hypertension with superimposed preeclampsia (Primary)    2. Advanced maternal age, primigravida, antepartum    3. Diabetes mellitus complicating pregnancy, unspecified trimester    4. 36 weeks gestation of pregnancy          ADVANCED MATERNAL AGE  Previously counseled.  NIPT wnl.    Recommend fetal growth surveillance. Start  fetal surveillance with weekly BPP at 32-36 weeks.  Recommend baby ASA, which she is taking.      POLYHYDRAMNIOS, MILD -- resolved  Previously Counseled  ALBA today is 19    CHRONIC HYPERTENSION  Amlodipine 10 mg   Previously counseled.   Serial growths at MFM.  ANFS twice weekly between MFM and OB.    Severe range blood pressures are noted today in office.  Previously had a severe range on 10/29.  Given multiple severe ranges beyond 34 weeks with previous appropriate control, suspect preeclampsia with severe features.    Recommend admission to labor and delivery for delivery.  We reviewed the need for magnesium for seizure prophylaxis.  Additionally, we discussed about the potential need of IV antihypertensives to maintain blood pressure in the normal range.  she is currently on the maximum dose of amlodipine.  Do not add Procardia if additional medications are required.  If additional medication is needed for long-acting coverage,  consider labetalol.  Given pregestational diabetes and late  period, would not proceed with BTMS.      PREGESTATIONAL DM, TYPE 2  Previously counseled.  Last A1c 2024 6.5  EKG wnl May 2024  Reports normal optho appt in May 2024    Blood sugar logs reviewed and blood sugars are all WNL. No insulin regimen changes made today.     PREPREGNANCY REGIMEN  Metformin 850 mg x 2 with dinner  Actos 45 mg with dinner    CURRENT REGIMEN  Metformin 1000 mg PO BID with meals  Lantus 44/44    Fetal ECHO wnl.  Progress Notes by Stuart Galan MD (2024 15:00)     Postpartum recommendations: Half Lantus to .  If there are lows or she does not tolerate can resume prepregnancy regimen.     Recommendations:  -ASA 81 mg--continue daily  -normal PIH labs, & 24 hour urine  - Recommend emailing/fax/submitting her log weekly for review; to bring log to all MFM office visits.  -Continue ADA diet with carbohydrate counting.  -Continue glucose monitoring 6-7 times daily as instructed.  -Continue medications as above  -Ultrasound for fetal growth every 4 weeks  - testing at 32 weeks gestation, twice weekly between M and primary OB  -Continue routine prenatal care with primary ob team   -At present goal for delivery is 38 weeks for CHTN and DM    Postpartum  [ ] Ensure transition of care to endocrinology or primary care provider.      Follow-up:  admit for delivery. Dr. Mallory and labor and delivery notified of admission.     Thank you for the consult and opportunity to care for this patient.  Please feel free to reach out with any questions or concerns.    I spent 30 minutes caring for this patient on this date of service. This time includes time spent by me in the following activities: preparing for the visit, reviewing tests, obtaining and/or reviewing a separately obtained history, performing a medically appropriate examination and/or evaluation, counseling and educating the patient/family/caregiver and  independently interpreting results and communicating that information with the patient/family/caregiver with greater than 50% spent in counseling and coordination of care.         I spent 5 minutes on the separately reported service of US imaging not included in the time used to support the E/M service also reported today.      Leah Martel MD   Maternal Fetal Medicine-[facility]  Office: [phone]

## 2024-10-31 NOTE — PROGRESS NOTES
Pt reports that she is doing well and denies vaginal bleeding, cramping, contractions or LOF at this time. Reports active fetal movement. Reviewed when to call OB office or present to L&D for evaluation with symptoms such as decreased fetal movement, vaginal bleeding, LOF or ctxs. Pt verbalized understanding. Denies visual changes or epigastric pain. Notes irregular HA and nausea. Denies any additional complaints at time of appointment. Next OB appointment scheduled for 11/5.    Blood sugar at time of appointment is 93. Logs on chart for MFM to review.     Vitals:    10/31/24 1355   BP: 164/76   Pulse:    Temp:    SpO2:

## 2024-10-31 NOTE — PLAN OF CARE
Goal Outcome Evaluation:  Plan of Care Reviewed With: patient           Outcome Evaluation: Patient admitted for IOL for HTN. Plan of care, including magnesium initiation, blood pressure management and induction reviewed with patient. Patient started on magnesium drip without issues. Cytotec placed per order without issue

## 2024-10-31 NOTE — H&P
[facility]  Obstetric History and Physical    Chief Complaint   Patient presents with    Hypertension     Sent from [facility] for IOL due to severe range blood pressures.        Subjective     Patient is a 39 y.o. female  currently at 36w0d, who presents with superimposed preeclampsia with severe features.  Denies HA/vision changes/RUQ pain.  Reports she has had lower extremity edema    This pregnancy has been complicated by CHTN (on amlodipine), T2DM (controlled on metformin and insulin), Asthma (hospitalization in childhood), Obesity.        Prenatal Information:  Prenatal Results       Initial Prenatal Labs       Test Value Reference Range Date Time    Hemoglobin  12.9 g/dL 12.0 - 15.9 24 1113       12.6 g/dL 11.1 - 15.9 04/15/24     Hematocrit  38.0 % 34.0 - 46.6 24 1113       38.3 % 34.0 - 46.6 04/15/24     Platelets  370 10*3/mm3 140 - 450 24 1113       385 x10E3/uL 150 - 450 04/15/24     Rubella IgG  <0.90 index Immune >0.99 04/15/24     Hepatitis B SAg  Negative  Negative 04/15/24     Hepatitis C Ab  Non Reactive  Non Reactive 04/15/24     RPR  Non Reactive  Non Reactive 04/15/24     T. Pallidum Ab   Non-Reactive  Non-Reactive 10/31/24 1553       Non Reactive  Non Reactive 24 1355    ABO  A   04/15/24     Rh  Positive   04/15/24     Antibody Screen  Negative  Negative 04/15/24     HIV  Non Reactive  Non Reactive 04/15/24     Urine Culture  Final report   04/15/24 1155    Gonorrhea  Negative  Negative 04/15/24 1153    Chlamydia  Negative  Negative 04/15/24 1153    TSH        HgB A1c   6.5 % 4.8 - 5.6 04/15/24     Varicella IgG        Hemoglobinopathy Fractionation        Hemoglobinopathy (genetic testing)        Cystic fibrosis                   Fetal testing        Test Value Reference Range Date Time    NIPT        MSAFP        AFP-4                  2nd and 3rd Trimester       Test Value Reference Range Date Time    Hemoglobin (repeated)  12.4 g/dL 12.0 - 15.9 10/31/24 1553        12.7 g/dL 11.1 - 15.9 09/20/24 1355    Hematocrit (repeated)  37.4 % 34.0 - 46.6 10/31/24 1553       39.1 % 34.0 - 46.6 09/20/24 1355    Platelets   339 10*3/mm3 140 - 450 10/31/24 1553       370 10*3/mm3 140 - 450 07/05/24 1113       385 x10E3/uL 150 - 450 04/15/24     1 hour GTT         Antibody Screen (repeated)        3rd TM syphilis scrn (repeated)  RPR         3rd TM syphilis scrn (repeated) TP-Ab  Non-Reactive  Non-Reactive 10/31/24 1553       Non Reactive  Non Reactive 09/20/24 1355    3rd TM syphilis screen TB-Ab (FTA)  Non-Reactive  Non-Reactive 10/31/24 1553       Non Reactive  Non Reactive 09/20/24 1355    Syphilis cascade test TP-Ab (EIA)        Syphilis cascade TPPA        GTT Fasting        GTT 1 Hr        GTT 2 Hr        GTT 3 Hr        Group B Strep  Negative  Negative 10/29/24 1515              Other testing        Test Value Reference Range Date Time    Parvo IgG         CMV IgG                   Drug Screening       Test Value Reference Range Date Time    Amphetamine Screen  Negative ng/mL Uzbzpz=8629 04/15/24 1155    Barbiturate Screen  Negative ng/mL Lzkfdd=697 04/15/24 1155    Benzodiazepine Screen  Negative ng/mL Gkongn=503 04/15/24 1155    Methadone Screen  Negative ng/mL Weubrf=727 04/15/24 1155    Phencyclidine Screen  Negative ng/mL Cutoff=25 04/15/24 1155    Opiates Screen  Negative ng/mL Ewdlfa=526 04/15/24 1155    THC Screen  Negative ng/mL Cutoff=50 04/15/24 1155    Cocaine Screen  Negative ng/mL Bcztzy=801 04/15/24 1155    Propoxyphene Screen  Negative ng/mL Nybnat=840 04/15/24 1155    Buprenorphine Screen        Methamphetamine Screen        Oxycodone Screen        Tricyclic Antidepressants Screen                  Legend    ^: Historical                          External Prenatal Results       Pregnancy Outside Results - Transcribed From Office Records - See Scanned Records For Details       Test Value Date Time    ABO  A  04/15/24     Rh  Positive  04/15/24     Antibody Screen   Negative  04/15/24     Varicella IgG       Rubella  <0.90 index 04/15/24     Hgb  12.4 g/dL 10/31/24 1553       12.7 g/dL 24 1355       12.9 g/dL 24 1113       12.6 g/dL 04/15/24     Hct  37.4 % 10/31/24 1553       39.1 % 24 1355       38.0 % 24 1113       38.3 % 04/15/24     HgB A1c   6.5 % 04/15/24     1h GTT       3h GTT Fasting       3h GTT 1 hour       3h GTT 2 hour       3h GTT 3 hour        Gonorrhea (discrete)  Negative  04/15/24 1153    Chlamydia (discrete)  Negative  04/15/24 1153    RPR  Non Reactive  04/15/24     Syphils cascade: TP-Ab (FTA)  Non-Reactive  10/31/24 1553       Non Reactive  24 1355    TP-Ab  Non-Reactive  10/31/24 1553       Non Reactive  24 1355    TP-Ab (EIA)       TPPA       HBsAg  Negative  04/15/24     Herpes Simplex Virus PCR       Herpes Simplex VIrus Culture       HIV  Non Reactive  04/15/24     Hep C RNA Quant PCR       Hep C Antibody  Non Reactive  04/15/24     AFP       NIPT       Cystic Fibroisis        Group B Strep  Negative  10/29/24 1515    GBS Susceptibility to Clindamycin       GBS Susceptibility to Erythromycin       Fetal Fibronectin       Genetic Testing, Maternal Blood                 Drug Screening       Test Value Date Time    Urine Drug Screen       Amphetamine Screen  Negative ng/mL 04/15/24 1155    Barbiturate Screen  Negative ng/mL 04/15/24 1155    Benzodiazepine Screen  Negative ng/mL 04/15/24 1155    Methadone Screen  Negative ng/mL 04/15/24 1155    Phencyclidine Screen  Negative ng/mL 04/15/24 1155    Opiates Screen       THC Screen       Cocaine Screen       Propoxyphene Screen  Negative ng/mL 04/15/24 1155    Buprenorphine Screen       Methamphetamine Screen       Oxycodone Screen       Tricyclic Antidepressants Screen                 Legend    ^: Historical                             Past OB History:     OB History    Para Term  AB Living   2 0 0 0 1 0   SAB IAB Ectopic Molar Multiple Live Births   1 0 0  0 0 0      # Outcome Date GA Lbr Tramaine/2nd Weight Sex Type Anes PTL Lv   2 Current            1 SAB                Past Medical History: Past Medical History:   Diagnosis Date    Adhesive capsulitis of left shoulder 09/08/2022    Formatting of this note might be different from the original.   Added automatically from request for surgery [id number]      Asthma 02/01/1991    Chronic hypertension     Congenital cystic kidney disease 05/20/2024    Diabetes     Diabetes mellitus, type 2     TERE (generalized anxiety disorder)     GERD (gastroesophageal reflux disease)     HTN (hypertension)     Hypertension       Past Surgical History Past Surgical History:   Procedure Laterality Date    DILATATION AND CURETTAGE      KNEE ARTHROSCOPY W/ MENISCAL REPAIR      KNEE SURGERY      LAPAROSCOPIC CHOLECYSTECTOMY      ROTATOR CUFF REPAIR Left     x2    SHOULDER ARTHROSCOPY W/ LABRAL REPAIR Right     SHOULDER SURGERY        Family History: Family History   Problem Relation Age of Onset    Hypertension Father     Breast cancer Maternal Grandmother     Hypertension Maternal Grandfather     Ovarian cancer Neg Hx     Uterine cancer Neg Hx     Colon cancer Neg Hx       Social History:  reports that she has never smoked. She has never been exposed to tobacco smoke. She has never used smokeless tobacco.   reports that she does not currently use alcohol.   reports that she does not currently use drugs.         Review of Systems - Negative except per HPI for 10 point review of systems including General, Psychological, Ophthalmic, ENT, Endocrine, Respiratory, Cardiovascular, Gastrointestinal, Genito-Urinary, Musculoskeletal, Neurological, Dermatological      Objective       Vital Signs Range for the last 24 hours  Temperature: Temp:  [99.1 °F (37.3 °C)-100.5 °F (38.1 °C)] 100.5 °F (38.1 °C)   Temp Source: Temp src: Oral   BP: BP: (160-168)/(71-84) 165/71   Pulse: Heart Rate:  [94-98] 94   Respirations: Resp:  [18] 18   SPO2: SpO2:  [99 %] 99 %    O2 Amount (l/min):     O2 Devices Device (Oxygen Therapy): room air   Weight: Weight:  [147 kg (324 lb)-148 kg (326 lb)] 147 kg (324 lb)     Physical Examination: General appearance - alert, well appearing, and in no distress  Mental status - alert, oriented to person, place, and time  Eyes - sclera anicteric, left eye normal, right eye normal  Chest - no tachypnea, retractions or cyanosis  Heart - normal rate and regular rhythm  Abdomen - soft, nontender, gravid  Pelvic - see exam  Back exam - full range of motion, no tenderness, palpable spasm or pain on motion   Neurological - alert, oriented, normal speech, no focal findings or movement disorder noted   Musculoskeletal - no joint tenderness, deformity or swelling  Extremities - pedal edema trace  Skin - normal coloration and turgor, no rashes, no suspicious skin lesions noted    Presentation: Vertex   Cervix: Exam by: Method: sterile vaginal exam performed (ROXY Garcia)   Dilation: Cervical Dilation (cm): 0   Effacement: Cervical Effacement: 30   Station:         Fetal Heart Rate Assessment   Method:     Beats/min:     Baseline:     Varibility:     Accels:     Decels:     Tracing Category:       Uterine Assessment   Method:     Frequency (min):     Ctx Count in 10 min:     Duration:     Intensity:     Intensity by IUPC:     Resting Tone:     Resting Tone by IUPC:     [place] Units:       Lab Results   Component Value Date    WBC 12.67 (H) 10/31/2024    HGB 12.4 10/31/2024    HCT 37.4 10/31/2024    MCV 85.0 10/31/2024     10/31/2024      US: 10/10- [id number]%, AC 95%    Assessment & Plan   Assessment:  1.  Intrauterine pregnancy at 36w0d weeks gestation with reactive, reassuring fetal status.    2.  Superimposed preeclampsia with severe features  3.  GBS status: Unknown  4. T2DM  5. Asthma    Plan:  1. Pt was counseled, plan for magnesium, IV antihypertensives PRN. Continue home amlodipine  2. IOL: plan for misoprostol for cervical ripening, epidural  when desired. Counseled on labor course  3. T2DM: blood sugar 60 with CMP, treat PRN. Postpartum, continue metformin. Hold insulin as was on all oral agents prior to pregnancy  4. Asthma: would like to bring inhaler from home  5. Plan for penicillin for GBS unknown    Plan of care has been reviewed with patient.  Risks, benefits of treatment plan have been discussed.  All questions have been answered.      Stephanie Mallory MD  10/31/2024  16:56 EDT

## 2024-11-01 ENCOUNTER — ANESTHESIA EVENT (OUTPATIENT)
Dept: LABOR AND DELIVERY | Facility: HOSPITAL | Age: 39
End: 2024-11-01
Payer: COMMERCIAL

## 2024-11-01 ENCOUNTER — ANESTHESIA (OUTPATIENT)
Dept: LABOR AND DELIVERY | Facility: HOSPITAL | Age: 39
End: 2024-11-01
Payer: COMMERCIAL

## 2024-11-01 LAB
BASOPHILS # BLD AUTO: 0.04 10*3/MM3 (ref 0–0.2)
BASOPHILS NFR BLD AUTO: 0.3 % (ref 0–1.5)
BUPRENORPHINE UR QL: NEGATIVE NG/ML
DEPRECATED RDW RBC AUTO: 37.7 FL (ref 37–54)
EOSINOPHIL # BLD AUTO: 0.16 10*3/MM3 (ref 0–0.4)
EOSINOPHIL NFR BLD AUTO: 1.3 % (ref 0.3–6.2)
ERYTHROCYTE [DISTWIDTH] IN BLOOD BY AUTOMATED COUNT: 12.7 % (ref 12.3–15.4)
GLUCOSE BLDC GLUCOMTR-MCNC: 55 MG/DL (ref 70–130)
GLUCOSE BLDC GLUCOMTR-MCNC: 66 MG/DL (ref 70–130)
GLUCOSE BLDC GLUCOMTR-MCNC: 67 MG/DL (ref 70–130)
GLUCOSE BLDC GLUCOMTR-MCNC: 71 MG/DL (ref 70–130)
GLUCOSE BLDC GLUCOMTR-MCNC: 73 MG/DL (ref 70–130)
GLUCOSE BLDC GLUCOMTR-MCNC: 74 MG/DL (ref 70–130)
GLUCOSE BLDC GLUCOMTR-MCNC: 76 MG/DL (ref 70–130)
GLUCOSE BLDC GLUCOMTR-MCNC: 81 MG/DL (ref 70–130)
GLUCOSE BLDC GLUCOMTR-MCNC: 81 MG/DL (ref 70–130)
HCT VFR BLD AUTO: 38.6 % (ref 34–46.6)
HGB BLD-MCNC: 13.1 G/DL (ref 12–15.9)
IMM GRANULOCYTES # BLD AUTO: 0.12 10*3/MM3 (ref 0–0.05)
IMM GRANULOCYTES NFR BLD AUTO: 0.9 % (ref 0–0.5)
LYMPHOCYTES # BLD AUTO: 1.84 10*3/MM3 (ref 0.7–3.1)
LYMPHOCYTES NFR BLD AUTO: 14.6 % (ref 19.6–45.3)
MCH RBC QN AUTO: 28.5 PG (ref 26.6–33)
MCHC RBC AUTO-ENTMCNC: 33.9 G/DL (ref 31.5–35.7)
MCV RBC AUTO: 83.9 FL (ref 79–97)
MONOCYTES # BLD AUTO: 0.64 10*3/MM3 (ref 0.1–0.9)
MONOCYTES NFR BLD AUTO: 5.1 % (ref 5–12)
NEUTROPHILS NFR BLD AUTO: 77.8 % (ref 42.7–76)
NEUTROPHILS NFR BLD AUTO: 9.84 10*3/MM3 (ref 1.7–7)
NRBC BLD AUTO-RTO: 0 /100 WBC (ref 0–0.2)
PLATELET # BLD AUTO: 352 10*3/MM3 (ref 140–450)
PMV BLD AUTO: 9.4 FL (ref 6–12)
RBC # BLD AUTO: 4.6 10*6/MM3 (ref 3.77–5.28)
WBC NRBC COR # BLD AUTO: 12.64 10*3/MM3 (ref 3.4–10.8)

## 2024-11-01 PROCEDURE — 25810000003 LACTATED RINGERS PER 1000 ML: Performed by: OBSTETRICS & GYNECOLOGY

## 2024-11-01 PROCEDURE — 85025 COMPLETE CBC W/AUTO DIFF WBC: CPT | Performed by: STUDENT IN AN ORGANIZED HEALTH CARE EDUCATION/TRAINING PROGRAM

## 2024-11-01 PROCEDURE — 25010000002 MAGNESIUM SULFATE 20 GM/500ML SOLUTION: Performed by: OBSTETRICS & GYNECOLOGY

## 2024-11-01 PROCEDURE — 3E033VJ INTRODUCTION OF OTHER HORMONE INTO PERIPHERAL VEIN, PERCUTANEOUS APPROACH: ICD-10-PCS | Performed by: OBSTETRICS & GYNECOLOGY

## 2024-11-01 PROCEDURE — 82948 REAGENT STRIP/BLOOD GLUCOSE: CPT

## 2024-11-01 PROCEDURE — 25010000002 ONDANSETRON PER 1 MG: Performed by: OBSTETRICS & GYNECOLOGY

## 2024-11-01 RX ORDER — MISOPROSTOL 100 MCG
25 TABLET ORAL ONCE
Status: COMPLETED | OUTPATIENT
Start: 2024-11-01 | End: 2024-11-01

## 2024-11-01 RX ORDER — FENTANYL/ROPIVACAINE/NS/PF 2MCG/ML-.2
10 PLASTIC BAG, INJECTION (ML) INJECTION CONTINUOUS
Status: DISCONTINUED | OUTPATIENT
Start: 2024-11-01 | End: 2024-11-02

## 2024-11-01 RX ORDER — OXYTOCIN/0.9 % SODIUM CHLORIDE 30/500 ML
2-20 PLASTIC BAG, INJECTION (ML) INTRAVENOUS
Status: DISCONTINUED | OUTPATIENT
Start: 2024-11-01 | End: 2024-11-02

## 2024-11-01 RX ORDER — EPHEDRINE SULFATE 50 MG/ML
5 INJECTION, SOLUTION INTRAVENOUS
Status: DISCONTINUED | OUTPATIENT
Start: 2024-11-01 | End: 2024-11-02

## 2024-11-01 RX ORDER — PHYTONADIONE 1 MG/.5ML
INJECTION, EMULSION INTRAMUSCULAR; INTRAVENOUS; SUBCUTANEOUS
Status: ACTIVE
Start: 2024-11-01 | End: 2024-11-02

## 2024-11-01 RX ORDER — DIPHENHYDRAMINE HYDROCHLORIDE 50 MG/ML
12.5 INJECTION INTRAMUSCULAR; INTRAVENOUS EVERY 8 HOURS PRN
Status: DISCONTINUED | OUTPATIENT
Start: 2024-11-01 | End: 2024-11-02

## 2024-11-01 RX ORDER — ERYTHROMYCIN 5 MG/G
OINTMENT OPHTHALMIC
Status: ACTIVE
Start: 2024-11-01 | End: 2024-11-01

## 2024-11-01 RX ORDER — FAMOTIDINE 10 MG/ML
20 INJECTION, SOLUTION INTRAVENOUS ONCE AS NEEDED
Status: DISCONTINUED | OUTPATIENT
Start: 2024-11-01 | End: 2024-11-02

## 2024-11-01 RX ORDER — ONDANSETRON 2 MG/ML
4 INJECTION INTRAMUSCULAR; INTRAVENOUS ONCE AS NEEDED
Status: COMPLETED | OUTPATIENT
Start: 2024-11-01 | End: 2024-11-02

## 2024-11-01 RX ORDER — ERYTHROMYCIN 5 MG/G
OINTMENT OPHTHALMIC
Status: ACTIVE
Start: 2024-11-01 | End: 2024-11-02

## 2024-11-01 RX ORDER — PHYTONADIONE 1 MG/.5ML
INJECTION, EMULSION INTRAMUSCULAR; INTRAVENOUS; SUBCUTANEOUS
Status: ACTIVE
Start: 2024-11-01 | End: 2024-11-01

## 2024-11-01 RX ADMIN — SODIUM CHLORIDE, POTASSIUM CHLORIDE, SODIUM LACTATE AND CALCIUM CHLORIDE 75 ML/HR: 600; 310; 30; 20 INJECTION, SOLUTION INTRAVENOUS at 18:45

## 2024-11-01 RX ADMIN — SODIUM CHLORIDE, POTASSIUM CHLORIDE, SODIUM LACTATE AND CALCIUM CHLORIDE 75 ML/HR: 600; 310; 30; 20 INJECTION, SOLUTION INTRAVENOUS at 03:52

## 2024-11-01 RX ADMIN — DINOPROSTONE 10 MG: 10 INSERT VAGINAL at 10:00

## 2024-11-01 RX ADMIN — Medication 25 MCG: at 05:28

## 2024-11-01 RX ADMIN — Medication 2 MILLI-UNITS/MIN: at 22:57

## 2024-11-01 RX ADMIN — ACETAMINOPHEN 325MG 650 MG: 325 TABLET ORAL at 19:29

## 2024-11-01 RX ADMIN — ACETAMINOPHEN 325MG 650 MG: 325 TABLET ORAL at 08:12

## 2024-11-01 RX ADMIN — Medication 25 MCG: at 01:06

## 2024-11-01 RX ADMIN — AMLODIPINE BESYLATE 10 MG: 10 TABLET ORAL at 21:10

## 2024-11-01 RX ADMIN — ONDANSETRON 4 MG: 2 INJECTION, SOLUTION INTRAMUSCULAR; INTRAVENOUS at 09:35

## 2024-11-01 RX ADMIN — MAGNESIUM SULFATE IN WATER 2 G/HR: 20 INJECTION, SOLUTION INTRAVENOUS at 09:35

## 2024-11-01 RX ADMIN — ONDANSETRON 4 MG: 2 INJECTION, SOLUTION INTRAMUSCULAR; INTRAVENOUS at 00:51

## 2024-11-01 RX ADMIN — MAGNESIUM SULFATE IN WATER 2 G/HR: 20 INJECTION, SOLUTION INTRAVENOUS at 19:40

## 2024-11-01 NOTE — PLAN OF CARE
Problem: Adult Inpatient Plan of Care  Goal: Plan of Care Review  Outcome: Progressing  Flowsheets (Taken 11/1/2024 1800)  Progress: no change  Outcome Evaluation: Pt IOL continued. Pt cervidil placed and will be removed this evening. Pt reports occasional ctx. Pt ambulating well. Pt on mag. Blood pressures remain mild range today. Pt had a headache this AM and was treated with Tylenol. Pt family at bedside. Will continue POC.  Goal: Patient-Specific Goal (Individualized)  Outcome: Progressing  Goal: Absence of Hospital-Acquired Illness or Injury  Outcome: Progressing  Intervention: Identify and Manage Fall Risk  Recent Flowsheet Documentation  Taken 11/1/2024 1600 by Darren Orantes RN  Safety Promotion/Fall Prevention: safety round/check completed  Taken 11/1/2024 1200 by Darren Orantes RN  Safety Promotion/Fall Prevention: safety round/check completed  Taken 11/1/2024 0800 by Darren Orantes RN  Safety Promotion/Fall Prevention: safety round/check completed  Intervention: Prevent and Manage VTE (Venous Thromboembolism) Risk  Recent Flowsheet Documentation  Taken 11/1/2024 0800 by Darren Orantes RN  VTE Prevention/Management:   bilateral   SCDs (sequential compression devices) on  Goal: Optimal Comfort and Wellbeing  Outcome: Progressing  Intervention: Provide Person-Centered Care  Recent Flowsheet Documentation  Taken 11/1/2024 0800 by Darren Orantes RN  Trust Relationship/Rapport:   care explained   choices provided   emotional support provided   empathic listening provided   questions answered   questions encouraged   reassurance provided   thoughts/feelings acknowledged  Goal: Readiness for Transition of Care  Outcome: Progressing   Goal Outcome Evaluation:           Progress: no change  Outcome Evaluation: Pt IOL continued. Pt cervidil placed and will be removed this evening. Pt reports occasional ctx. Pt ambulating well. Pt on mag. Blood pressures remain mild range today. Pt had a headache this AM and was treated  with Tylenol. Pt family at bedside. Will continue POC.

## 2024-11-01 NOTE — PROGRESS NOTES
Patient reports she is tolerating magnesium well.  She does have an occasional wave of nausea.  Blood pressures are not in the severe range.  She has received 4 doses of Cytotec and her nurse recently checked her and states that she is not quite half a centimeter.  He felt that a a Cook catheter could not be placed as she is not dilated enough.  I discussed with her next steps moving forward and discussed cervical ripening with Cervidil and she would like to do Cervidil.  I discussed with her that as long as she is tolerating the magnesium well, fetal status is reassuring, and blood pressures are not in the severe range, we can continue trial of labor.

## 2024-11-01 NOTE — PLAN OF CARE
Problem: Adult Inpatient Plan of Care  Goal: Plan of Care Review  Outcome: Progressing  Flowsheets (Taken 11/1/2024 0626)  Outcome Evaluation: Pt IOL for HTN. Magnesium infusing at 2g/hr. Fourth dose of cytoter placed at 0528. SVE ws 0-1/50/-3. Pt feels cramping occasionally. OK to continue plan of care.  Goal: Patient-Specific Goal (Individualized)  Outcome: Progressing  Goal: Absence of Hospital-Acquired Illness or Injury  Outcome: Progressing  Intervention: Identify and Manage Fall Risk  Recent Flowsheet Documentation  Taken 10/31/2024 1900 by Claudia Gill RN  Safety Promotion/Fall Prevention: safety round/check completed  Intervention: Prevent and Manage VTE (Venous Thromboembolism) Risk  Recent Flowsheet Documentation  Taken 10/31/2024 1900 by Claudia Gill RN  VTE Prevention/Management:   bilateral   SCDs (sequential compression devices) on  Goal: Optimal Comfort and Wellbeing  Outcome: Progressing  Intervention: Provide Person-Centered Care  Recent Flowsheet Documentation  Taken 10/31/2024 1900 by Claudia Gill RN  Trust Relationship/Rapport:   care explained   choices provided   emotional support provided   empathic listening provided   questions answered   questions encouraged   reassurance provided   thoughts/feelings acknowledged  Goal: Readiness for Transition of Care  Outcome: Progressing     Problem: Labor  Goal: Hemostasis  Outcome: Progressing  Goal: Stable Fetal Wellbeing  Outcome: Progressing  Goal: Effective Progression to Delivery  Outcome: Progressing  Goal: Absence of Infection Signs and Symptoms  Outcome: Progressing  Goal: Acceptable Pain Control  Outcome: Progressing  Goal: Normal Uterine Contraction Pattern  Outcome: Progressing     Problem: Hypertensive Disorders in Pregnancy  Goal: Patient-Fetal Stabilization  Outcome: Progressing     Problem: Anesthesia/Analgesia, Neuraxial  Goal: Safe, Effective Infusion Delivery  Outcome: Progressing  Goal: Stable Patient-Fetal  Status  Outcome: Progressing  Goal: Absence of Infection Signs and Symptoms  Outcome: Progressing  Goal: Nausea and Vomiting Relief  Outcome: Progressing  Goal: Optimal Pain Control and Function  Outcome: Progressing  Goal: Effective Oxygenation and Ventilation  Outcome: Progressing  Goal: Baseline Motor Function Return  Outcome: Progressing  Intervention: Optimize Sensorimotor Function and Safety  Recent Flowsheet Documentation  Taken 10/31/2024 1900 by Claudia Gill RN  Safety Promotion/Fall Prevention: safety round/check completed  Goal: Effective Urinary Elimination  Outcome: Progressing   Goal Outcome Evaluation:              Outcome Evaluation: Pt IOL for HTN. Magnesium infusing at 2g/hr. Fourth dose of cytoter placed at 0528. SVE ws 0-1/50/-3. Pt feels cramping occasionally. OK to continue plan of care.

## 2024-11-02 LAB
EXPIRATION DATE: NORMAL
GLUCOSE BLDC GLUCOMTR-MCNC: 217 MG/DL (ref 70–130)
GLUCOSE BLDC GLUCOMTR-MCNC: 72 MG/DL (ref 70–130)
GLUCOSE BLDC GLUCOMTR-MCNC: 78 MG/DL (ref 70–130)
GLUCOSE BLDC GLUCOMTR-MCNC: 81 MG/DL (ref 70–130)
Lab: NORMAL
PROT UR STRIP-MCNC: NEGATIVE MG/DL

## 2024-11-02 PROCEDURE — 25810000003 LACTATED RINGERS PER 1000 ML: Performed by: OBSTETRICS & GYNECOLOGY

## 2024-11-02 PROCEDURE — 25010000002 MAGNESIUM SULFATE 20 GM/500ML SOLUTION: Performed by: OBSTETRICS & GYNECOLOGY

## 2024-11-02 PROCEDURE — 25010000002 ONDANSETRON PER 1 MG: Performed by: ANESTHESIOLOGY

## 2024-11-02 PROCEDURE — 82948 REAGENT STRIP/BLOOD GLUCOSE: CPT

## 2024-11-02 PROCEDURE — 63710000001 DIPHENHYDRAMINE PER 50 MG: Performed by: ANESTHESIOLOGY

## 2024-11-02 PROCEDURE — 88307 TISSUE EXAM BY PATHOLOGIST: CPT

## 2024-11-02 PROCEDURE — 81002 URINALYSIS NONAUTO W/O SCOPE: CPT | Performed by: OBSTETRICS & GYNECOLOGY

## 2024-11-02 PROCEDURE — 94799 UNLISTED PULMONARY SVC/PX: CPT

## 2024-11-02 PROCEDURE — 25010000002 MORPHINE PER 10 MG: Performed by: ANESTHESIOLOGY

## 2024-11-02 PROCEDURE — 94761 N-INVAS EAR/PLS OXIMETRY MLT: CPT

## 2024-11-02 PROCEDURE — 25010000002 BUPIVACAINE PF 0.75 % SOLUTION: Performed by: ANESTHESIOLOGY

## 2024-11-02 PROCEDURE — 25010000002 KETOROLAC TROMETHAMINE PER 15 MG: Performed by: OBSTETRICS & GYNECOLOGY

## 2024-11-02 PROCEDURE — 94640 AIRWAY INHALATION TREATMENT: CPT

## 2024-11-02 PROCEDURE — 25010000002 CEFAZOLIN 3 G RECONSTITUTED SOLUTION 1 EACH VIAL: Performed by: OBSTETRICS & GYNECOLOGY

## 2024-11-02 PROCEDURE — 59510 CESAREAN DELIVERY: CPT | Performed by: OBSTETRICS & GYNECOLOGY

## 2024-11-02 RX ORDER — TRANEXAMIC ACID 10 MG/ML
1000 INJECTION, SOLUTION INTRAVENOUS ONCE AS NEEDED
Status: DISCONTINUED | OUTPATIENT
Start: 2024-11-02 | End: 2024-11-03

## 2024-11-02 RX ORDER — HYDROCODONE BITARTRATE AND ACETAMINOPHEN 5; 325 MG/1; MG/1
1 TABLET ORAL EVERY 6 HOURS PRN
Status: DISCONTINUED | OUTPATIENT
Start: 2024-11-02 | End: 2024-11-06 | Stop reason: HOSPADM

## 2024-11-02 RX ORDER — DIPHENHYDRAMINE HYDROCHLORIDE 50 MG/ML
25 INJECTION INTRAMUSCULAR; INTRAVENOUS EVERY 4 HOURS PRN
Status: DISCONTINUED | OUTPATIENT
Start: 2024-11-02 | End: 2024-11-06 | Stop reason: HOSPADM

## 2024-11-02 RX ORDER — CARBOPROST TROMETHAMINE 250 UG/ML
250 INJECTION, SOLUTION INTRAMUSCULAR
Status: DISCONTINUED | OUTPATIENT
Start: 2024-11-02 | End: 2024-11-03 | Stop reason: HOSPADM

## 2024-11-02 RX ORDER — IBUPROFEN 600 MG/1
600 TABLET, FILM COATED ORAL EVERY 6 HOURS
Status: DISCONTINUED | OUTPATIENT
Start: 2024-11-03 | End: 2024-11-06 | Stop reason: HOSPADM

## 2024-11-02 RX ORDER — OXYTOCIN/0.9 % SODIUM CHLORIDE 30/500 ML
250 PLASTIC BAG, INJECTION (ML) INTRAVENOUS CONTINUOUS
Status: ACTIVE | OUTPATIENT
Start: 2024-11-02 | End: 2024-11-02

## 2024-11-02 RX ORDER — KETOROLAC TROMETHAMINE 15 MG/ML
15 INJECTION, SOLUTION INTRAMUSCULAR; INTRAVENOUS EVERY 6 HOURS
Status: COMPLETED | OUTPATIENT
Start: 2024-11-02 | End: 2024-11-03

## 2024-11-02 RX ORDER — FAMOTIDINE 10 MG/ML
20 INJECTION, SOLUTION INTRAVENOUS ONCE AS NEEDED
Status: COMPLETED | OUTPATIENT
Start: 2024-11-02 | End: 2024-11-02

## 2024-11-02 RX ORDER — TRANEXAMIC ACID 10 MG/ML
1000 INJECTION, SOLUTION INTRAVENOUS ONCE AS NEEDED
Status: DISCONTINUED | OUTPATIENT
Start: 2024-11-02 | End: 2024-11-02

## 2024-11-02 RX ORDER — ONDANSETRON 2 MG/ML
4 INJECTION INTRAMUSCULAR; INTRAVENOUS ONCE AS NEEDED
Status: DISCONTINUED | OUTPATIENT
Start: 2024-11-02 | End: 2024-11-02

## 2024-11-02 RX ORDER — ACETAMINOPHEN 500 MG
1000 TABLET ORAL ONCE
Status: COMPLETED | OUTPATIENT
Start: 2024-11-02 | End: 2024-11-02

## 2024-11-02 RX ORDER — DROPERIDOL 2.5 MG/ML
0.62 INJECTION, SOLUTION INTRAMUSCULAR; INTRAVENOUS
Status: DISCONTINUED | OUTPATIENT
Start: 2024-11-02 | End: 2024-11-06 | Stop reason: HOSPADM

## 2024-11-02 RX ORDER — ERYTHROMYCIN 5 MG/G
OINTMENT OPHTHALMIC
Status: ACTIVE
Start: 2024-11-02 | End: 2024-11-02

## 2024-11-02 RX ORDER — MORPHINE SULFATE 2 MG/ML
2 INJECTION, SOLUTION INTRAMUSCULAR; INTRAVENOUS
Status: ACTIVE | OUTPATIENT
Start: 2024-11-02 | End: 2024-11-02

## 2024-11-02 RX ORDER — OXYTOCIN/0.9 % SODIUM CHLORIDE 30/500 ML
125 PLASTIC BAG, INJECTION (ML) INTRAVENOUS ONCE AS NEEDED
Status: COMPLETED | OUTPATIENT
Start: 2024-11-02 | End: 2024-11-02

## 2024-11-02 RX ORDER — HYDROXYZINE HYDROCHLORIDE 50 MG/1
50 TABLET, FILM COATED ORAL EVERY 6 HOURS PRN
Status: DISCONTINUED | OUTPATIENT
Start: 2024-11-02 | End: 2024-11-06 | Stop reason: HOSPADM

## 2024-11-02 RX ORDER — MORPHINE SULFATE 1 MG/ML
INJECTION, SOLUTION EPIDURAL; INTRATHECAL; INTRAVENOUS AS NEEDED
Status: DISCONTINUED | OUTPATIENT
Start: 2024-11-02 | End: 2024-11-02 | Stop reason: SURG

## 2024-11-02 RX ORDER — MAGNESIUM SULFATE HEPTAHYDRATE 40 MG/ML
2 INJECTION, SOLUTION INTRAVENOUS CONTINUOUS
Status: DISPENSED | OUTPATIENT
Start: 2024-11-02 | End: 2024-11-03

## 2024-11-02 RX ORDER — MISOPROSTOL 200 UG/1
800 TABLET ORAL ONCE AS NEEDED
Status: DISCONTINUED | OUTPATIENT
Start: 2024-11-02 | End: 2024-11-03 | Stop reason: HOSPADM

## 2024-11-02 RX ORDER — AMLODIPINE BESYLATE 5 MG/1
5 TABLET ORAL NIGHTLY
Status: DISCONTINUED | OUTPATIENT
Start: 2024-11-02 | End: 2024-11-02

## 2024-11-02 RX ORDER — HYDROMORPHONE HYDROCHLORIDE 1 MG/ML
0.5 INJECTION, SOLUTION INTRAMUSCULAR; INTRAVENOUS; SUBCUTANEOUS
Status: DISCONTINUED | OUTPATIENT
Start: 2024-11-02 | End: 2024-11-06 | Stop reason: HOSPADM

## 2024-11-02 RX ORDER — BUPIVACAINE HYDROCHLORIDE 7.5 MG/ML
INJECTION, SOLUTION EPIDURAL; RETROBULBAR AS NEEDED
Status: DISCONTINUED | OUTPATIENT
Start: 2024-11-02 | End: 2024-11-02 | Stop reason: SURG

## 2024-11-02 RX ORDER — AMLODIPINE BESYLATE 10 MG/1
10 TABLET ORAL NIGHTLY
Status: DISCONTINUED | OUTPATIENT
Start: 2024-11-02 | End: 2024-11-06 | Stop reason: HOSPADM

## 2024-11-02 RX ORDER — METFORMIN HYDROCHLORIDE 500 MG/1
1000 TABLET, EXTENDED RELEASE ORAL 2 TIMES DAILY WITH MEALS
Status: DISCONTINUED | OUTPATIENT
Start: 2024-11-02 | End: 2024-11-06 | Stop reason: HOSPADM

## 2024-11-02 RX ORDER — OXYTOCIN/0.9 % SODIUM CHLORIDE 30/500 ML
999 PLASTIC BAG, INJECTION (ML) INTRAVENOUS ONCE
Status: DISCONTINUED | OUTPATIENT
Start: 2024-11-02 | End: 2024-11-03 | Stop reason: HOSPADM

## 2024-11-02 RX ORDER — DIPHENHYDRAMINE HCL 25 MG
25 CAPSULE ORAL EVERY 4 HOURS PRN
Status: DISCONTINUED | OUTPATIENT
Start: 2024-11-02 | End: 2024-11-06 | Stop reason: HOSPADM

## 2024-11-02 RX ORDER — DOCUSATE SODIUM 100 MG/1
100 CAPSULE, LIQUID FILLED ORAL 2 TIMES DAILY
Status: DISCONTINUED | OUTPATIENT
Start: 2024-11-02 | End: 2024-11-06 | Stop reason: HOSPADM

## 2024-11-02 RX ORDER — METHYLERGONOVINE MALEATE 0.2 MG/ML
200 INJECTION INTRAVENOUS ONCE AS NEEDED
Status: DISCONTINUED | OUTPATIENT
Start: 2024-11-02 | End: 2024-11-03 | Stop reason: HOSPADM

## 2024-11-02 RX ORDER — METHYLERGONOVINE MALEATE 0.2 MG/ML
200 INJECTION INTRAVENOUS ONCE AS NEEDED
Status: DISCONTINUED | OUTPATIENT
Start: 2024-11-02 | End: 2024-11-02

## 2024-11-02 RX ORDER — PHYTONADIONE 1 MG/.5ML
INJECTION, EMULSION INTRAMUSCULAR; INTRAVENOUS; SUBCUTANEOUS
Status: ACTIVE
Start: 2024-11-02 | End: 2024-11-02

## 2024-11-02 RX ORDER — ONDANSETRON 2 MG/ML
4 INJECTION INTRAMUSCULAR; INTRAVENOUS EVERY 6 HOURS PRN
Status: DISCONTINUED | OUTPATIENT
Start: 2024-11-02 | End: 2024-11-03

## 2024-11-02 RX ORDER — ONDANSETRON 2 MG/ML
4 INJECTION INTRAMUSCULAR; INTRAVENOUS ONCE AS NEEDED
Status: COMPLETED | OUTPATIENT
Start: 2024-11-02 | End: 2024-11-05

## 2024-11-02 RX ORDER — OXYTOCIN/0.9 % SODIUM CHLORIDE 30/500 ML
125 PLASTIC BAG, INJECTION (ML) INTRAVENOUS ONCE AS NEEDED
Status: DISCONTINUED | OUTPATIENT
Start: 2024-11-02 | End: 2024-11-02

## 2024-11-02 RX ORDER — LIDOCAINE HYDROCHLORIDE 10 MG/ML
0.5 INJECTION, SOLUTION INFILTRATION; PERINEURAL ONCE AS NEEDED
Status: DISCONTINUED | OUTPATIENT
Start: 2024-11-02 | End: 2024-11-02

## 2024-11-02 RX ORDER — CARBOPROST TROMETHAMINE 250 UG/ML
250 INJECTION, SOLUTION INTRAMUSCULAR
Status: DISCONTINUED | OUTPATIENT
Start: 2024-11-02 | End: 2024-11-02

## 2024-11-02 RX ORDER — NALOXONE HCL 0.4 MG/ML
0.2 VIAL (ML) INJECTION
Status: DISCONTINUED | OUTPATIENT
Start: 2024-11-02 | End: 2024-11-06 | Stop reason: HOSPADM

## 2024-11-02 RX ORDER — MISOPROSTOL 200 UG/1
800 TABLET ORAL ONCE AS NEEDED
Status: DISCONTINUED | OUTPATIENT
Start: 2024-11-02 | End: 2024-11-02

## 2024-11-02 RX ORDER — ENOXAPARIN SODIUM 100 MG/ML
40 INJECTION SUBCUTANEOUS EVERY 12 HOURS
Status: DISCONTINUED | OUTPATIENT
Start: 2024-11-03 | End: 2024-11-06 | Stop reason: HOSPADM

## 2024-11-02 RX ORDER — PHENYLEPHRINE HCL IN 0.9% NACL 1 MG/10 ML
SYRINGE (ML) INTRAVENOUS AS NEEDED
Status: DISCONTINUED | OUTPATIENT
Start: 2024-11-02 | End: 2024-11-02 | Stop reason: SURG

## 2024-11-02 RX ORDER — BUDESONIDE AND FORMOTEROL FUMARATE DIHYDRATE 160; 4.5 UG/1; UG/1
2 AEROSOL RESPIRATORY (INHALATION)
Status: DISCONTINUED | OUTPATIENT
Start: 2024-11-02 | End: 2024-11-06 | Stop reason: HOSPADM

## 2024-11-02 RX ORDER — HYDROMORPHONE HYDROCHLORIDE 1 MG/ML
0.5 INJECTION, SOLUTION INTRAMUSCULAR; INTRAVENOUS; SUBCUTANEOUS
Status: ACTIVE | OUTPATIENT
Start: 2024-11-02 | End: 2024-11-02

## 2024-11-02 RX ORDER — TRANEXAMIC ACID 10 MG/ML
1000 INJECTION, SOLUTION INTRAVENOUS ONCE AS NEEDED
Status: DISCONTINUED | OUTPATIENT
Start: 2024-11-02 | End: 2024-11-06 | Stop reason: HOSPADM

## 2024-11-02 RX ORDER — KETOROLAC TROMETHAMINE 30 MG/ML
30 INJECTION, SOLUTION INTRAMUSCULAR; INTRAVENOUS ONCE
Status: COMPLETED | OUTPATIENT
Start: 2024-11-02 | End: 2024-11-02

## 2024-11-02 RX ORDER — ACETAMINOPHEN 500 MG
1000 TABLET ORAL EVERY 6 HOURS
Status: DISPENSED | OUTPATIENT
Start: 2024-11-02 | End: 2024-11-03

## 2024-11-02 RX ORDER — ACETAMINOPHEN 325 MG/1
650 TABLET ORAL EVERY 6 HOURS
Status: DISCONTINUED | OUTPATIENT
Start: 2024-11-03 | End: 2024-11-06 | Stop reason: HOSPADM

## 2024-11-02 RX ORDER — SODIUM CHLORIDE, SODIUM LACTATE, POTASSIUM CHLORIDE, CALCIUM CHLORIDE 600; 310; 30; 20 MG/100ML; MG/100ML; MG/100ML; MG/100ML
75 INJECTION, SOLUTION INTRAVENOUS CONTINUOUS
Status: ACTIVE | OUTPATIENT
Start: 2024-11-02 | End: 2024-11-03

## 2024-11-02 RX ORDER — NALOXONE HCL 0.4 MG/ML
0.1 VIAL (ML) INJECTION
Status: DISCONTINUED | OUTPATIENT
Start: 2024-11-02 | End: 2024-11-06 | Stop reason: HOSPADM

## 2024-11-02 RX ORDER — SODIUM CHLORIDE, SODIUM LACTATE, POTASSIUM CHLORIDE, CALCIUM CHLORIDE 600; 310; 30; 20 MG/100ML; MG/100ML; MG/100ML; MG/100ML
125 INJECTION, SOLUTION INTRAVENOUS CONTINUOUS
Status: DISCONTINUED | OUTPATIENT
Start: 2024-11-02 | End: 2024-11-02

## 2024-11-02 RX ORDER — BUDESONIDE AND FORMOTEROL FUMARATE DIHYDRATE 80; 4.5 UG/1; UG/1
2 AEROSOL RESPIRATORY (INHALATION)
Status: DISCONTINUED | OUTPATIENT
Start: 2024-11-02 | End: 2024-11-02 | Stop reason: CLARIF

## 2024-11-02 RX ADMIN — DOCUSATE SODIUM 100 MG: 100 CAPSULE, LIQUID FILLED ORAL at 20:41

## 2024-11-02 RX ADMIN — Medication 100 MCG: at 10:49

## 2024-11-02 RX ADMIN — Medication 200 MCG: at 10:52

## 2024-11-02 RX ADMIN — Medication 100 MCG: at 10:37

## 2024-11-02 RX ADMIN — AMLODIPINE BESYLATE 10 MG: 10 TABLET ORAL at 21:59

## 2024-11-02 RX ADMIN — FAMOTIDINE 20 MG: 10 INJECTION INTRAVENOUS at 10:15

## 2024-11-02 RX ADMIN — Medication 100 MCG: at 10:47

## 2024-11-02 RX ADMIN — ACETAMINOPHEN 325MG 650 MG: 325 TABLET ORAL at 05:20

## 2024-11-02 RX ADMIN — Medication 200 MCG: at 11:15

## 2024-11-02 RX ADMIN — BUPIVACAINE HYDROCHLORIDE 1.5 ML: 7.5 INJECTION, SOLUTION EPIDURAL; RETROBULBAR at 10:32

## 2024-11-02 RX ADMIN — Medication 100 MCG: at 11:13

## 2024-11-02 RX ADMIN — ACETAMINOPHEN 1000 MG: 500 TABLET, FILM COATED ORAL at 22:39

## 2024-11-02 RX ADMIN — BUDESONIDE AND FORMOTEROL FUMARATE DIHYDRATE 2 PUFF: 160; 4.5 AEROSOL RESPIRATORY (INHALATION) at 20:04

## 2024-11-02 RX ADMIN — Medication 100 MCG: at 10:56

## 2024-11-02 RX ADMIN — HYDROCODONE BITARTRATE AND ACETAMINOPHEN 1 TABLET: 5; 325 TABLET ORAL at 14:59

## 2024-11-02 RX ADMIN — Medication 200 MCG: at 11:10

## 2024-11-02 RX ADMIN — MAGNESIUM SULFATE IN WATER 2 G/HR: 20 INJECTION, SOLUTION INTRAVENOUS at 06:14

## 2024-11-02 RX ADMIN — Medication 100 MCG: at 11:01

## 2024-11-02 RX ADMIN — SODIUM CHLORIDE 3 G: 900 INJECTION INTRAVENOUS at 10:14

## 2024-11-02 RX ADMIN — Medication 100 MCG: at 10:58

## 2024-11-02 RX ADMIN — DIPHENHYDRAMINE HYDROCHLORIDE 25 MG: 25 CAPSULE ORAL at 15:42

## 2024-11-02 RX ADMIN — Medication 125 ML/HR: at 12:45

## 2024-11-02 RX ADMIN — Medication 100 MCG: at 11:06

## 2024-11-02 RX ADMIN — SODIUM CHLORIDE, POTASSIUM CHLORIDE, SODIUM LACTATE AND CALCIUM CHLORIDE 75 ML/HR: 600; 310; 30; 20 INJECTION, SOLUTION INTRAVENOUS at 07:12

## 2024-11-02 RX ADMIN — MORPHINE SULFATE 0.1 MG: 1 INJECTION, SOLUTION EPIDURAL; INTRATHECAL; INTRAVENOUS at 10:32

## 2024-11-02 RX ADMIN — ACETAMINOPHEN 500 MG: 500 TABLET, FILM COATED ORAL at 15:43

## 2024-11-02 RX ADMIN — ACETAMINOPHEN 1000 MG: 500 TABLET, FILM COATED ORAL at 10:15

## 2024-11-02 RX ADMIN — Medication 100 MCG: at 10:40

## 2024-11-02 RX ADMIN — Medication 200 MCG: at 10:34

## 2024-11-02 RX ADMIN — ONDANSETRON 4 MG: 2 INJECTION, SOLUTION INTRAMUSCULAR; INTRAVENOUS at 10:16

## 2024-11-02 RX ADMIN — METFORMIN HYDROCHLORIDE 1000 MG: 500 TABLET, EXTENDED RELEASE ORAL at 17:24

## 2024-11-02 RX ADMIN — Medication 100 MCG: at 10:55

## 2024-11-02 RX ADMIN — MAGNESIUM SULFATE HEPTAHYDRATE 2 G/HR: 40 INJECTION, SOLUTION INTRAVENOUS at 17:40

## 2024-11-02 RX ADMIN — Medication 100 MCG: at 11:03

## 2024-11-02 RX ADMIN — KETOROLAC TROMETHAMINE 30 MG: 30 INJECTION, SOLUTION INTRAMUSCULAR at 12:02

## 2024-11-02 RX ADMIN — SODIUM CHLORIDE, POTASSIUM CHLORIDE, SODIUM LACTATE AND CALCIUM CHLORIDE 75 ML/HR: 600; 310; 30; 20 INJECTION, SOLUTION INTRAVENOUS at 17:41

## 2024-11-02 RX ADMIN — HYDROCODONE BITARTRATE AND ACETAMINOPHEN 1 TABLET: 5; 325 TABLET ORAL at 20:40

## 2024-11-02 RX ADMIN — Medication 100 MCG: at 10:39

## 2024-11-02 RX ADMIN — Medication 100 MCG: at 10:44

## 2024-11-02 RX ADMIN — KETOROLAC TROMETHAMINE 15 MG: 15 INJECTION, SOLUTION INTRAMUSCULAR; INTRAVENOUS at 19:40

## 2024-11-02 RX ADMIN — Medication 100 MCG: at 11:00

## 2024-11-02 NOTE — PLAN OF CARE
Problem: Labor  Goal: Hemostasis  Outcome: Met  Goal: Stable Fetal Wellbeing  Outcome: Met  Goal: Effective Progression to Delivery  Outcome: Met  Goal: Absence of Infection Signs and Symptoms  Outcome: Met  Goal: Acceptable Pain Control  Outcome: Met  Goal: Normal Uterine Contraction Pattern  Outcome: Met     Problem: Adult Inpatient Plan of Care  Goal: Plan of Care Review  Flowsheets (Taken 2024 1236)  Progress: improving  Outcome Evaluation:   Pt admitted for IOL d/t pre-eclampsia   she received cytotec x4, cervidil, and was started on Pitocin - reaching 16 curtis-units/min. No cervical change was achieved and the patient chose  delivery. The procedure was completed without complication and patient is in recovery with Magnesium Sulfate restarted. Fundal exams are firm, midline, at the umbilicus, with scant to light bleeding. VSS   denies pain at this time.  Plan of Care Reviewed With:   patient   spouse  Goal: Absence of Hospital-Acquired Illness or Injury  Intervention: Identify and Manage Fall Risk  Recent Flowsheet Documentation  Taken 2024 1200 by Kaylyn Hansen RN  Safety Promotion/Fall Prevention: safety round/check completed  Intervention: Prevent and Manage VTE (Venous Thromboembolism) Risk  Recent Flowsheet Documentation  Taken 2024 1215 by Kaylyn Hansen RN  VTE Prevention/Management:   bilateral   SCDs (sequential compression devices) on  Taken 2024 1200 by Kaylyn Hansen RN  VTE Prevention/Management:   bilateral   SCDs (sequential compression devices) on  Goal: Optimal Comfort and Wellbeing  Intervention: Provide Person-Centered Care  Recent Flowsheet Documentation  Taken 2024 1200 by Kaylyn Hansen RN  Trust Relationship/Rapport:   care explained   choices provided   emotional support provided   questions answered   empathic listening provided   questions encouraged   reassurance provided   thoughts/feelings acknowledged     Problem: Anesthesia/Analgesia,  Neuraxial  Goal: Baseline Motor Function Return  Intervention: Optimize Sensorimotor Function and Safety  Recent Flowsheet Documentation  Taken 2024 1200 by Kaylyn Hansen RN  Safety Promotion/Fall Prevention: safety round/check completed     Problem: Skin Injury Risk Increased  Goal: Skin Health and Integrity  Intervention: Optimize Skin Protection  Recent Flowsheet Documentation  Taken 2024 1200 by Kaylyn Hansen RN  Activity Management: bedrest     Problem: Postpartum ( Delivery)  Goal: Anesthesia/Sedation Recovery  Intervention: Optimize Anesthesia Recovery  Recent Flowsheet Documentation  Taken 2024 1200 by Kaylyn Hansen RN  Safety Promotion/Fall Prevention: safety round/check completed   Goal Outcome Evaluation:

## 2024-11-02 NOTE — ANESTHESIA PROCEDURE NOTES
Spinal Block      Patient reassessed immediately prior to procedure    Patient location during procedure: OR  Performed By  Anesthesiologist: Jorje Aldana MD  Preanesthetic Checklist  Completed: patient identified and risks and benefits discussed  Spinal Block Prep:  Patient Position:sitting  Sterile Tech:cap, gloves, mask and sterile barriers  Prep:Chloraprep  Patient Monitoring:blood pressure monitoring, continuous pulse oximetry and EKG    Spinal Block Procedure  Approach:midline  Location:L3-L4  Needle Type:Pencan  Needle Gauge:24 G  Placement of Spinal needle event:cerebrospinal fluid aspirated  Paresthesia: no  Fluid Appearance:clear     Post Assessment  Patient Tolerance:patient tolerated the procedure well with no apparent complications  Complications no

## 2024-11-02 NOTE — PLAN OF CARE
Problem: Adult Inpatient Plan of Care  Goal: Plan of Care Review  Outcome: Progressing  Flowsheets (Taken 2024 0652)  Progress: improving  Outcome Evaluation: VSS. Cervidil removed at . SVE unchanged from cervidil placement at 0-1/50/-2. Patient started on pitocin per MD order. Pitocin increased 2 curtis-units/min every 1 hour as tolerated. Blood pressures remain in range with amlodipine given. Continues on magnesium gtt. pitocin increased to 14. Tylenol given x2 for HA. Spoke with patient about thte potential for a . Patient stated she is open to it if her labor does not progress.  Plan of Care Reviewed With: patient  Goal: Patient-Specific Goal (Individualized)  Outcome: Progressing  Goal: Absence of Hospital-Acquired Illness or Injury  Outcome: Progressing  Intervention: Identify and Manage Fall Risk  Recent Flowsheet Documentation  Taken 2024 0400 by Marita Reid RN  Safety Promotion/Fall Prevention: safety round/check completed  Taken 2024 0303 by Marita Reid RN  Safety Promotion/Fall Prevention: safety round/check completed  Taken 2024 0200 by Marita Reid RN  Safety Promotion/Fall Prevention: safety round/check completed  Taken 2024 0100 by Marita Reid RN  Safety Promotion/Fall Prevention: safety round/check completed  Taken 2024 0000 by Marita Reid RN  Safety Promotion/Fall Prevention: safety round/check completed  Taken 2024 2300 by Marita Reid RN  Safety Promotion/Fall Prevention: safety round/check completed  Taken 2024 2200 by Marita Reid RN  Safety Promotion/Fall Prevention: safety round/check completed  Taken 2024 2000 by Marita Redi RN  Safety Promotion/Fall Prevention: safety round/check completed  Taken 2024 1910 by Marita Reid RN  Safety Promotion/Fall Prevention:   safety round/check completed   assistive device/personal items within reach   clutter free environment  maintained   nonskid shoes/slippers when out of bed  Intervention: Prevent Skin Injury  Recent Flowsheet Documentation  Taken 11/1/2024 1910 by Marita Reid RN  Body Position: position changed independently  Intervention: Prevent and Manage VTE (Venous Thromboembolism) Risk  Recent Flowsheet Documentation  Taken 11/2/2024 0400 by Marita Reid RN  VTE Prevention/Management:   SCDs (sequential compression devices) on   bilateral  Taken 11/2/2024 0000 by Marita Reid RN  VTE Prevention/Management:   bilateral   SCDs (sequential compression devices) on  Taken 11/1/2024 2000 by Marita Reid RN  VTE Prevention/Management:   bilateral   SCDs (sequential compression devices) on  Taken 11/1/2024 1910 by Marita Reid RN  VTE Prevention/Management:   bilateral   SCDs (sequential compression devices) on  Goal: Optimal Comfort and Wellbeing  Outcome: Progressing  Intervention: Monitor Pain and Promote Comfort  Recent Flowsheet Documentation  Taken 11/2/2024 0520 by Marita Reid RN  Pain Management Interventions: pain medication given  Taken 11/2/2024 0400 by Marita Reid RN  Pain Management Interventions: no interventions per patient request  Taken 11/2/2024 0000 by Marita Reid RN  Pain Management Interventions: no interventions per patient request  Taken 11/1/2024 2000 by Marita Reid RN  Pain Management Interventions:   no interventions per patient request   quiet environment facilitated  Taken 11/1/2024 1929 by Marita Reid RN  Pain Management Interventions: pain medication given  Taken 11/1/2024 1909 by Marita Reid RN  Pain Management Interventions: no interventions per patient request  Intervention: Provide Person-Centered Care  Recent Flowsheet Documentation  Taken 11/2/2024 0400 by Marita Reid RN  Trust Relationship/Rapport:   care explained   choices provided   questions answered   questions encouraged   reassurance provided   thoughts/feelings  acknowledged  Taken 11/2/2024 0000 by Marita Reid RN  Trust Relationship/Rapport:   care explained   choices provided   thoughts/feelings acknowledged   reassurance provided   questions encouraged   questions answered  Taken 11/1/2024 2000 by Marita Reid RN  Trust Relationship/Rapport:   care explained   choices provided   emotional support provided   empathic listening provided   questions answered   questions encouraged   reassurance provided   thoughts/feelings acknowledged  Taken 11/1/2024 1909 by Mairta Reid RN  Trust Relationship/Rapport:   care explained   choices provided   empathic listening provided   questions answered   questions encouraged   reassurance provided   thoughts/feelings acknowledged   emotional support provided  Goal: Readiness for Transition of Care  Outcome: Progressing     Problem: Labor  Goal: Hemostasis  Outcome: Progressing  Goal: Stable Fetal Wellbeing  Outcome: Progressing  Intervention: Promote and Monitor Fetal Wellbeing  Recent Flowsheet Documentation  Taken 11/1/2024 1910 by Marita Reid RN  Body Position: position changed independently  Goal: Effective Progression to Delivery  Outcome: Progressing  Goal: Absence of Infection Signs and Symptoms  Outcome: Progressing  Goal: Acceptable Pain Control  Outcome: Progressing  Goal: Normal Uterine Contraction Pattern  Outcome: Progressing     Problem: Hypertensive Disorders in Pregnancy  Goal: Patient-Fetal Stabilization  Outcome: Progressing     Problem: Anesthesia/Analgesia, Neuraxial  Goal: Safe, Effective Infusion Delivery  Outcome: Progressing  Goal: Stable Patient-Fetal Status  Outcome: Progressing  Goal: Absence of Infection Signs and Symptoms  Outcome: Progressing  Goal: Nausea and Vomiting Relief  Outcome: Progressing  Goal: Optimal Pain Control and Function  Outcome: Progressing  Intervention: Prevent or Manage Pain  Recent Flowsheet Documentation  Taken 11/2/2024 0520 by Marita Reid RN  Pain  Management Interventions: pain medication given  Taken 11/2/2024 0400 by Marita Reid RN  Pain Management Interventions: no interventions per patient request  Diversional Activities: television  Taken 11/2/2024 0000 by Marita Reid RN  Pain Management Interventions: no interventions per patient request  Taken 11/1/2024 2000 by Marita Reid RN  Pain Management Interventions:   no interventions per patient request   quiet environment facilitated  Diversional Activities: television  Taken 11/1/2024 1929 by Marita Reid RN  Pain Management Interventions: pain medication given  Taken 11/1/2024 1909 by Marita Reid RN  Pain Management Interventions: no interventions per patient request  Goal: Effective Oxygenation and Ventilation  Outcome: Progressing  Intervention: Optimize Oxygenation and Ventilation  Recent Flowsheet Documentation  Taken 11/1/2024 1910 by Marita Reid RN  Body Position: position changed independently  Goal: Baseline Motor Function Return  Outcome: Progressing  Intervention: Optimize Sensorimotor Function and Safety  Recent Flowsheet Documentation  Taken 11/2/2024 0400 by Marita Reid RN  Safety Promotion/Fall Prevention: safety round/check completed  Taken 11/2/2024 0303 by Marita Reid RN  Safety Promotion/Fall Prevention: safety round/check completed  Taken 11/2/2024 0200 by Marita Reid RN  Safety Promotion/Fall Prevention: safety round/check completed  Taken 11/2/2024 0100 by Marita Reid RN  Safety Promotion/Fall Prevention: safety round/check completed  Taken 11/2/2024 0000 by Marita Reid RN  Safety Promotion/Fall Prevention: safety round/check completed  Taken 11/1/2024 2300 by Marita Reid RN  Safety Promotion/Fall Prevention: safety round/check completed  Taken 11/1/2024 2200 by Marita Reid RN  Safety Promotion/Fall Prevention: safety round/check completed  Taken 11/1/2024 2000 by Marita Reid RN  Safety  Promotion/Fall Prevention: safety round/check completed  Taken 2024 1910 by Marita Reid RN  Safety Promotion/Fall Prevention:   safety round/check completed   assistive device/personal items within reach   clutter free environment maintained   nonskid shoes/slippers when out of bed  Goal: Effective Urinary Elimination  Outcome: Progressing   Goal Outcome Evaluation:  Plan of Care Reviewed With: patient        Progress: improving  Outcome Evaluation: VSS. Cervidil removed at 2204. SVE unchanged from cervidil placement at 0-/-2. Patient started on pitocin per MD order. Pitocin increased 2 curtis-units/min every 1 hour as tolerated. Blood pressures remain in range with amlodipine given. Continues on magnesium gtt. pitocin increased to 14. Tylenol given x2 for HA. Spoke with patient about thte potential for a . Patient stated she is open to it if her labor does not progress.

## 2024-11-02 NOTE — OP NOTE
Operative Note      Lavern Rehman  39 y.o.  1985  female  [id number]      2024    Surgeons and Role:     * aJg Barrios MD - Primary     Pre-op Diagnosis:   1.  Intrauterine pregnancy at 36-2/7 weeks  2.  Chronic hypertension with superimposed preeclampsia  3.  Type 2 diabetes  4.  Failed induction of labor      Post-Op Diagnosis Codes:  Same                      Assist= Dr. Lozada.  His responsibilities included knot-tying, exposure, assistance with delivery of the infant and assistance with clinical judgment.  His participation was integral to the performance of the procedure.  Findings/Complications:  Vigorous female  weighing 7 pounds 11 ounces.  Apgars 8, 9    Description of procedure:  Procedure(s) and Anesthesia Type:     *  SECTION PRIMARY - Choice  The patient was taken to the operating room where spinal anesthesia was induced.  She was then placed in supine position on the operating room table.  The abdomen was prepped and draped in a sterile fashion and adequacy of anesthesia was confirmed using the Allis test.  A Pfannenstiel incision was made.  Dissection was carried sharply down to the fascia, which was incised in the midline.  The superior and inferior fascial flaps were created.  Rectus muscles were  and the peritoneal cavity was entered.      The uterovesical fold of peritoneum was grasped and incised.  Bladder flap was then taken down.  A transverse incision was made in the lower uterine segment.  This was extended laterally in both directions.  Amniotomy was performed with return of clear fluid.  The fetal head was delivered.  Mouth and naris were suctioned with a bulb while on the abdomen.  Shoulders were delivered without difficulty, followed by the remainder of the infant.  After delay of 30 seconds, the cord was doubly clamped and divided.  The infant was then handed to the waiting delivery team who assigned Apgars of 8 and 9.  This is a vigorous female  .      The placenta was expressed.  The uterus was then exteriorized from the abdominal wound and any remaining membranes were swept from the endometrial cavity using moist laparotomy sponges.  Attention was turned to the uterine incision.  This was approximated with a locked running suture of 0 Vicryl.  A second layer was placed, imbricating the first.  The incision was examined.  It was completely hemostatic.  The abdomen was then irrigated with large amounts of warm water and the uterus was reduced back into the pelvis.  The uterine incision was reexamined without tension.  It remained completely hemostatic.      All laparotomy sponges and instruments were removed and all counts were correct.  Attention was turned to closure.  The muscle was approximated with interrupted 0 Vicryl sutures.  The fascia was approximated with a running 0 Vicryl.  Subcutaneous tissue was examined and small bleeding points were controlled with the electrocautery.  The subcutaneous was approximated with 3-0 plain gut.  The skin was approximated with 4-0 Vicryl in a running subcuticular fashion.  The patient was taken to recovery in stable condition.  Anesthesia= spinal    Estimated Blood Loss:  690cc    Specimens: * No orders in the log *    [unfilled]      Jag Barrios MD  2024

## 2024-11-02 NOTE — PROGRESS NOTES
39-year-old  2 para 0 at 36-2/7 weeks induced for chronic hypertension with superimposed preeclampsia.  Induction has been undertaken using Cervidil.  The patient did not make cervical change and was given Cytotec.  This also did not produce cervical change.  The patient then received Pitocin.  Despite all of this, the patient is less than a centimeter dilated.  I counseled her regarding the reason for her induction as well as the methods that have been used and options going forward.  The patient would like to proceed to a  delivery for failed induction and I feel that this is appropriate.  We discussed the procedure itself as well as its benefits, risks and alternatives.  I answered the patient's questions and she would like to proceed.

## 2024-11-02 NOTE — ANESTHESIA POSTPROCEDURE EVALUATION
Patient: Lavern Rehman    Procedure Summary       Date: 24 Room / Location:  [facility]   [facility]    Anesthesia Start: 1023 Anesthesia Stop: 1149    Procedure:  SECTION PRIMARY (Abdomen) Diagnosis: (Failure to Progress)    Surgeons: Jag Barrios MD Provider: Jorje Aldana MD    Anesthesia Type: spinal ASA Status: 3 - Emergent            Anesthesia Type: spinal    Vitals  Vitals Value Taken Time   /57 24 1600   Temp 37.2 °C (98.9 °F) 24 1200   Pulse 84 24 1614   Resp 16 24 1500   SpO2 98 % 24 1614   Vitals shown include unfiled device data.        Post Anesthesia Care and Evaluation    Patient location during evaluation: bedside  Patient participation: complete - patient participated  Level of consciousness: awake and alert  Pain management: adequate    Airway patency: patent  Anesthetic complications: No anesthetic complications  PONV Status: controlled  Cardiovascular status: blood pressure returned to baseline and acceptable  Respiratory status: acceptable  Hydration status: acceptable

## 2024-11-02 NOTE — ANESTHESIA PREPROCEDURE EVALUATION
Anesthesia Evaluation     Patient summary reviewed and Nursing notes reviewed   no history of anesthetic complications:   NPO Solid Status: > 8 hours  NPO Liquid Status: > 2 hours           Airway   Mallampati: II  TM distance: >3 FB  Neck ROM: full  Dental      Pulmonary    (+) asthma,  Cardiovascular         Neuro/Psych  GI/Hepatic/Renal/Endo    (+) morbid obesity, GERD, diabetes mellitus    Musculoskeletal     Abdominal    Substance History      OB/GYN    (+) Pregnant, Preeclampsia        Other                    Anesthesia Plan    ASA 3 - emergent     spinal     (36w2d)    Anesthetic plan, risks, benefits, and alternatives have been provided, discussed and informed consent has been obtained with: patient.    CODE STATUS:    Level Of Support Discussed With: Patient  Code Status (Patient has no pulse and is not breathing): CPR (Attempt to Resuscitate)  Medical Interventions (Patient has pulse or is breathing): Full Support

## 2024-11-03 LAB
BASOPHILS # BLD AUTO: 0.03 10*3/MM3 (ref 0–0.2)
BASOPHILS NFR BLD AUTO: 0.3 % (ref 0–1.5)
DEPRECATED RDW RBC AUTO: 41 FL (ref 37–54)
EOSINOPHIL # BLD AUTO: 0.15 10*3/MM3 (ref 0–0.4)
EOSINOPHIL NFR BLD AUTO: 1.5 % (ref 0.3–6.2)
ERYTHROCYTE [DISTWIDTH] IN BLOOD BY AUTOMATED COUNT: 12.7 % (ref 12.3–15.4)
GLUCOSE BLDC GLUCOMTR-MCNC: 142 MG/DL (ref 70–130)
GLUCOSE BLDC GLUCOMTR-MCNC: 154 MG/DL (ref 70–130)
GLUCOSE BLDC GLUCOMTR-MCNC: 159 MG/DL (ref 70–130)
GLUCOSE BLDC GLUCOMTR-MCNC: 186 MG/DL (ref 70–130)
GLUCOSE BLDC GLUCOMTR-MCNC: 227 MG/DL (ref 70–130)
HCT VFR BLD AUTO: 34.3 % (ref 34–46.6)
HGB BLD-MCNC: 10.8 G/DL (ref 12–15.9)
IMM GRANULOCYTES # BLD AUTO: 0.07 10*3/MM3 (ref 0–0.05)
IMM GRANULOCYTES NFR BLD AUTO: 0.7 % (ref 0–0.5)
LYMPHOCYTES # BLD AUTO: 1.89 10*3/MM3 (ref 0.7–3.1)
LYMPHOCYTES NFR BLD AUTO: 19.3 % (ref 19.6–45.3)
MCH RBC QN AUTO: 27.8 PG (ref 26.6–33)
MCHC RBC AUTO-ENTMCNC: 31.5 G/DL (ref 31.5–35.7)
MCV RBC AUTO: 88.2 FL (ref 79–97)
MONOCYTES # BLD AUTO: 0.73 10*3/MM3 (ref 0.1–0.9)
MONOCYTES NFR BLD AUTO: 7.4 % (ref 5–12)
NEUTROPHILS NFR BLD AUTO: 6.94 10*3/MM3 (ref 1.7–7)
NEUTROPHILS NFR BLD AUTO: 70.8 % (ref 42.7–76)
NRBC BLD AUTO-RTO: 0 /100 WBC (ref 0–0.2)
PLATELET # BLD AUTO: 323 10*3/MM3 (ref 140–450)
PMV BLD AUTO: 9.5 FL (ref 6–12)
RBC # BLD AUTO: 3.89 10*6/MM3 (ref 3.77–5.28)
WBC NRBC COR # BLD AUTO: 9.81 10*3/MM3 (ref 3.4–10.8)

## 2024-11-03 PROCEDURE — 94799 UNLISTED PULMONARY SVC/PX: CPT

## 2024-11-03 PROCEDURE — 82948 REAGENT STRIP/BLOOD GLUCOSE: CPT

## 2024-11-03 PROCEDURE — 25010000002 KETOROLAC TROMETHAMINE PER 15 MG: Performed by: OBSTETRICS & GYNECOLOGY

## 2024-11-03 PROCEDURE — 85025 COMPLETE CBC W/AUTO DIFF WBC: CPT | Performed by: OBSTETRICS & GYNECOLOGY

## 2024-11-03 PROCEDURE — 63710000001 INSULIN GLARGINE PER 5 UNITS: Performed by: OBSTETRICS & GYNECOLOGY

## 2024-11-03 PROCEDURE — 94761 N-INVAS EAR/PLS OXIMETRY MLT: CPT

## 2024-11-03 PROCEDURE — 94664 DEMO&/EVAL PT USE INHALER: CPT

## 2024-11-03 PROCEDURE — 63710000001 INSULIN LISPRO (HUMAN) PER 5 UNITS: Performed by: OBSTETRICS & GYNECOLOGY

## 2024-11-03 PROCEDURE — 25010000002 MAGNESIUM SULFATE 20 GM/500ML SOLUTION: Performed by: OBSTETRICS & GYNECOLOGY

## 2024-11-03 PROCEDURE — 25010000002 ENOXAPARIN PER 10 MG: Performed by: OBSTETRICS & GYNECOLOGY

## 2024-11-03 RX ORDER — DEXTROSE MONOHYDRATE 25 G/50ML
25 INJECTION, SOLUTION INTRAVENOUS
Status: DISCONTINUED | OUTPATIENT
Start: 2024-11-03 | End: 2024-11-06 | Stop reason: HOSPADM

## 2024-11-03 RX ORDER — SIMETHICONE 80 MG
80 TABLET,CHEWABLE ORAL 4 TIMES DAILY PRN
Status: DISCONTINUED | OUTPATIENT
Start: 2024-11-03 | End: 2024-11-06 | Stop reason: HOSPADM

## 2024-11-03 RX ORDER — GLUCAGON 1 MG/ML
1 KIT INJECTION
Status: DISCONTINUED | OUTPATIENT
Start: 2024-11-03 | End: 2024-11-06 | Stop reason: HOSPADM

## 2024-11-03 RX ORDER — NICOTINE POLACRILEX 4 MG
15 LOZENGE BUCCAL
Status: DISCONTINUED | OUTPATIENT
Start: 2024-11-03 | End: 2024-11-06 | Stop reason: HOSPADM

## 2024-11-03 RX ORDER — INSULIN LISPRO 100 [IU]/ML
4-24 INJECTION, SOLUTION INTRAVENOUS; SUBCUTANEOUS
Status: DISCONTINUED | OUTPATIENT
Start: 2024-11-03 | End: 2024-11-06 | Stop reason: HOSPADM

## 2024-11-03 RX ORDER — INSULIN LISPRO 100 [IU]/ML
8 INJECTION, SOLUTION INTRAVENOUS; SUBCUTANEOUS ONCE
Status: COMPLETED | OUTPATIENT
Start: 2024-11-03 | End: 2024-11-03

## 2024-11-03 RX ADMIN — MAGNESIUM SULFATE HEPTAHYDRATE 2 G/HR: 40 INJECTION, SOLUTION INTRAVENOUS at 02:32

## 2024-11-03 RX ADMIN — HYDROCODONE BITARTRATE AND ACETAMINOPHEN 1 TABLET: 5; 325 TABLET ORAL at 20:42

## 2024-11-03 RX ADMIN — KETOROLAC TROMETHAMINE 15 MG: 15 INJECTION, SOLUTION INTRAMUSCULAR; INTRAVENOUS at 01:26

## 2024-11-03 RX ADMIN — INSULIN LISPRO 4 UNITS: 100 INJECTION, SOLUTION INTRAVENOUS; SUBCUTANEOUS at 21:15

## 2024-11-03 RX ADMIN — ENOXAPARIN SODIUM 40 MG: 100 INJECTION SUBCUTANEOUS at 12:20

## 2024-11-03 RX ADMIN — DOCUSATE SODIUM 100 MG: 100 CAPSULE, LIQUID FILLED ORAL at 08:38

## 2024-11-03 RX ADMIN — INSULIN LISPRO 8 UNITS: 100 INJECTION, SOLUTION INTRAVENOUS; SUBCUTANEOUS at 09:48

## 2024-11-03 RX ADMIN — BUDESONIDE AND FORMOTEROL FUMARATE DIHYDRATE 2 PUFF: 160; 4.5 AEROSOL RESPIRATORY (INHALATION) at 20:27

## 2024-11-03 RX ADMIN — IBUPROFEN 600 MG: 600 TABLET, FILM COATED ORAL at 20:42

## 2024-11-03 RX ADMIN — SIMETHICONE 80 MG: 80 TABLET, CHEWABLE ORAL at 20:51

## 2024-11-03 RX ADMIN — INSULIN GLARGINE 22 UNITS: 100 INJECTION, SOLUTION SUBCUTANEOUS at 09:47

## 2024-11-03 RX ADMIN — KETOROLAC TROMETHAMINE 15 MG: 15 INJECTION, SOLUTION INTRAMUSCULAR; INTRAVENOUS at 14:12

## 2024-11-03 RX ADMIN — METFORMIN HYDROCHLORIDE 1000 MG: 500 TABLET, EXTENDED RELEASE ORAL at 21:15

## 2024-11-03 RX ADMIN — BUDESONIDE AND FORMOTEROL FUMARATE DIHYDRATE 2 PUFF: 160; 4.5 AEROSOL RESPIRATORY (INHALATION) at 08:43

## 2024-11-03 RX ADMIN — METFORMIN HYDROCHLORIDE 1000 MG: 500 TABLET, EXTENDED RELEASE ORAL at 07:59

## 2024-11-03 RX ADMIN — ACETAMINOPHEN 1000 MG: 500 TABLET, FILM COATED ORAL at 04:28

## 2024-11-03 RX ADMIN — INSULIN GLARGINE 22 UNITS: 100 INJECTION, SOLUTION SUBCUTANEOUS at 21:16

## 2024-11-03 RX ADMIN — DOCUSATE SODIUM 100 MG: 100 CAPSULE, LIQUID FILLED ORAL at 20:42

## 2024-11-03 RX ADMIN — AMLODIPINE BESYLATE 10 MG: 10 TABLET ORAL at 20:51

## 2024-11-03 RX ADMIN — MAGNESIUM HYDROXIDE 10 ML: 2400 SUSPENSION ORAL at 20:51

## 2024-11-03 RX ADMIN — KETOROLAC TROMETHAMINE 15 MG: 15 INJECTION, SOLUTION INTRAMUSCULAR; INTRAVENOUS at 07:04

## 2024-11-03 NOTE — PROGRESS NOTES
Section Progress Note    Assessment & Plan     Status post  section: Postoperative course complicated by severe preeclampsia and insulin requiring type 2 diabetes    Chronic hypertension with superimposed preeclampsia with severe features--magnesium sulfate will be discontinued at 24 hours postpartum.  She is tolerating the magnesium well and blood pressures are in the normal to mild range.  She is currently on amlodipine and I discussed with her the possibility of adding another antihypertensive if needed.  Type 2 diabetes--patient's insulin was discontinued after delivery and she has elevated blood sugars this morning.  She is also not following a diabetic diet.  I have reordered her Lantus at 22 units every morning and nightly.  I have also added corrective Humalog insulin.  We can have MFM see her tomorrow if her blood sugars are not controlled once insulin has been restarted.    Rh status: A positive  Syphilis screen delivery admit: Nonreactive  Rubella: Nonimmune--MMR ordered  Gender: Female    Subjective     Postpartum Day 1:  Delivery    The patient feels well. The patient denies emotional concerns. Pain is well controlled with current medications. The baby is well. Urinary output is adequate. The patient is not ambulating well. The patient is tolerating a normal diet. Patient denies flatus.    Objective     Vital signs in last 24 hours:  Temp:  [98.2 °F (36.8 °C)-98.9 °F (37.2 °C)] 98.3 °F (36.8 °C)  Heart Rate:  [] 84  Resp:  [16-18] 18  BP: ()/(34-88) 140/57      General:    alert, appears stated age, and cooperative   Bowel Sounds:  active   Lochia:  appropriate   Uterine Fundus:   firm   Incision: Bandage in place   DVT Evaluation:  No evidence of DVT seen on physical exam.     Lab Results   Component Value Date    WBC 9.81 2024    HGB 10.8 (L) 2024    HCT 34.3 2024    MCV 88.2 2024     2024         Laura Gordon,  MD  11/3/2024  09:28 EST

## 2024-11-03 NOTE — NURSING NOTE
Patient called out to say she was going to have her  get her something to eat from the cafeteria. Went to check fasting glucose prior to patient eating but patient stated that she had been eating candy bars so the fasting check would not be accurate.

## 2024-11-03 NOTE — PLAN OF CARE
Problem: Adult Inpatient Plan of Care  Goal: Plan of Care Review  Outcome: Progressing  Flowsheets (Taken 11/3/2024 0639)  Outcome Evaluation: Patient had no severe range pressures over night. Magnesiumon 2g/hr. VSS. Assisting patient with bonding and pumping. Pt reports pain controlled at this time.  Goal: Patient-Specific Goal (Individualized)  Outcome: Progressing  Goal: Absence of Hospital-Acquired Illness or Injury  Outcome: Progressing  Intervention: Identify and Manage Fall Risk  Recent Flowsheet Documentation  Taken 11/3/2024 0015 by Leonarda Lantigua RN  Safety Promotion/Fall Prevention: safety round/check completed  Taken 11/2/2024 1905 by Leonarda Lantigua RN  Safety Promotion/Fall Prevention: safety round/check completed  Intervention: Prevent and Manage VTE (Venous Thromboembolism) Risk  Recent Flowsheet Documentation  Taken 11/2/2024 1905 by Leonarda Lantigua RN  VTE Prevention/Management:   bilateral   SCDs (sequential compression devices) on  Goal: Optimal Comfort and Wellbeing  Outcome: Progressing  Intervention: Provide Person-Centered Care  Recent Flowsheet Documentation  Taken 11/3/2024 0015 by Leonarda Lantigua RN  Trust Relationship/Rapport:   care explained   emotional support provided   choices provided   questions answered   empathic listening provided   reassurance provided   questions encouraged   thoughts/feelings acknowledged  Goal: Readiness for Transition of Care  Outcome: Progressing     Problem: Hypertensive Disorders in Pregnancy  Goal: Patient-Fetal Stabilization  Outcome: Progressing     Problem: Anesthesia/Analgesia, Neuraxial  Goal: Safe, Effective Infusion Delivery  Outcome: Progressing  Goal: Stable Patient-Fetal Status  Outcome: Progressing  Goal: Absence of Infection Signs and Symptoms  Outcome: Progressing  Goal: Nausea and Vomiting Relief  Outcome: Progressing  Goal: Optimal Pain Control and Function  Outcome: Progressing  Goal: Effective Oxygenation and  Ventilation  Outcome: Progressing  Goal: Baseline Motor Function Return  Outcome: Progressing  Intervention: Optimize Sensorimotor Function and Safety  Recent Flowsheet Documentation  Taken 11/3/2024 0015 by Leonarda Lantigua RN  Safety Promotion/Fall Prevention: safety round/check completed  Taken 2024 by Leonarda Lantigua RN  Safety Promotion/Fall Prevention: safety round/check completed  Goal: Effective Urinary Elimination  Outcome: Progressing     Problem: Skin Injury Risk Increased  Goal: Skin Health and Integrity  Outcome: Progressing  Intervention: Optimize Skin Protection  Recent Flowsheet Documentation  Taken 2024 by Leonarda Lantigua RN  Activity Management:   standing at bedside   stepped/marched in place     Problem: Postpartum ( Delivery)  Goal: Successful Parent Role Transition  Outcome: Progressing  Goal: Hemostasis  Outcome: Progressing  Goal: Effective Bowel Elimination  Outcome: Progressing  Goal: Fluid and Electrolyte Balance  Outcome: Progressing  Goal: Absence of Infection Signs and Symptoms  Outcome: Progressing  Goal: Anesthesia/Sedation Recovery  Outcome: Progressing  Intervention: Optimize Anesthesia Recovery  Recent Flowsheet Documentation  Taken 11/3/2024 0015 by Leonarda Lantigua RN  Safety Promotion/Fall Prevention: safety round/check completed  Taken 2024 by Leonarda Lantigua RN  Safety Promotion/Fall Prevention: safety round/check completed  Goal: Optimal Pain Control and Function  Outcome: Progressing  Goal: Nausea and Vomiting Relief  Outcome: Progressing  Goal: Effective Urinary Elimination  Outcome: Progressing  Goal: Effective Oxygenation and Ventilation  Outcome: Progressing     Problem: Breastfeeding  Goal: Effective Breastfeeding  Outcome: Progressing   Goal Outcome Evaluation:              Outcome Evaluation: Patient had no severe range pressures over night. Magnesiumon 2g/hr. VSS. Assisting patient with bonding and pumping. Pt  reports pain controlled at this time.

## 2024-11-03 NOTE — LACTATION NOTE
P1 36 and 2    Patient currently in labor and delivery on magnesium drip.  She was set up on a HGP by her bedside RN.  She reports she has been pumping some and seeing a small amount of colostrum in the flange.  Encouraged patient to continue to pump every 3 hours as she is able.  D/w colostrum expectations and delayed milk production due to  delivery.  She expects to be moved to the post partum unit today.  She denies questions at this time.  Informed her LC is here for assistance today.

## 2024-11-03 NOTE — PLAN OF CARE
Problem: Adult Inpatient Plan of Care  Goal: Plan of Care Review  Outcome: Progressing  Goal: Absence of Hospital-Acquired Illness or Injury  Outcome: Progressing  Intervention: Identify and Manage Fall Risk  Recent Flowsheet Documentation  Taken 11/3/2024 0730 by Jojo Blas RN  Safety Promotion/Fall Prevention:   safety round/check completed   clutter free environment maintained   assistive device/personal items within reach  Intervention: Prevent Skin Injury  Recent Flowsheet Documentation  Taken 11/3/2024 0730 by Jojo Blas RN  Body Position: position changed independently  Intervention: Prevent and Manage VTE (Venous Thromboembolism) Risk  Recent Flowsheet Documentation  Taken 11/3/2024 0730 by Jojo Blas RN  VTE Prevention/Management:   bilateral   SCDs (sequential compression devices) on  Goal: Optimal Comfort and Wellbeing  Outcome: Progressing  Intervention: Provide Person-Centered Care  Recent Flowsheet Documentation  Taken 11/3/2024 0730 by Jojo Blas RN  Trust Relationship/Rapport:   care explained   choices provided   questions answered   questions encouraged  Goal: Readiness for Transition of Care  Outcome: Progressing     Problem: Hypertensive Disorders in Pregnancy  Goal: Patient-Fetal Stabilization  Outcome: Progressing     Problem: Skin Injury Risk Increased  Goal: Skin Health and Integrity  Outcome: Progressing  Intervention: Optimize Skin Protection  Recent Flowsheet Documentation  Taken 11/3/2024 0730 by Jojo Blas RN  Head of Bed (HOB) Positioning: HOB elevated     Problem: Postpartum ( Delivery)  Goal: Successful Parent Role Transition  Outcome: Progressing  Goal: Hemostasis  Outcome: Progressing  Goal: Effective Bowel Elimination  Outcome: Progressing  Goal: Fluid and Electrolyte Balance  Outcome: Progressing  Goal: Absence of Infection Signs and Symptoms  Outcome: Progressing  Goal: Anesthesia/Sedation Recovery  Outcome: Progressing  Intervention: Optimize  Anesthesia Recovery  Recent Flowsheet Documentation  Taken 11/3/2024 0730 by Jojo Blas, RN  Patient Tolerance (IS): good  Safety Promotion/Fall Prevention:   safety round/check completed   clutter free environment maintained   assistive device/personal items within reach  Administration (IS): self-administered  Goal: Optimal Pain Control and Function  Outcome: Progressing  Goal: Nausea and Vomiting Relief  Outcome: Progressing  Goal: Effective Urinary Elimination  Outcome: Progressing  Goal: Effective Oxygenation and Ventilation  Outcome: Progressing  Intervention: Optimize Oxygenation and Ventilation  Recent Flowsheet Documentation  Taken 11/3/2024 0730 by Jojo Blas, RN  Head of Bed (HOB) Positioning: HOB elevated     Problem: Breastfeeding  Goal: Effective Breastfeeding  Outcome: Progressing   Goal Outcome Evaluation:        Pt was 36 weeks and 2 days, . Pt had a  yesterday for failure to progress. Pt was on magnesium for 24 hours for superimposed pre-eclampsia. Pt has DM2.

## 2024-11-04 DIAGNOSIS — O24.919 DIABETES MELLITUS COMPLICATING PREGNANCY, UNSPECIFIED TRIMESTER: Primary | ICD-10-CM

## 2024-11-04 LAB
GLUCOSE BLDC GLUCOMTR-MCNC: 114 MG/DL (ref 70–130)
GLUCOSE BLDC GLUCOMTR-MCNC: 76 MG/DL (ref 70–130)
GLUCOSE BLDC GLUCOMTR-MCNC: 82 MG/DL (ref 70–130)
GLUCOSE BLDC GLUCOMTR-MCNC: 86 MG/DL (ref 70–130)

## 2024-11-04 PROCEDURE — 63710000001 INSULIN GLARGINE PER 5 UNITS: Performed by: OBSTETRICS & GYNECOLOGY

## 2024-11-04 PROCEDURE — 25010000002 ENOXAPARIN PER 10 MG: Performed by: OBSTETRICS & GYNECOLOGY

## 2024-11-04 PROCEDURE — 82948 REAGENT STRIP/BLOOD GLUCOSE: CPT

## 2024-11-04 PROCEDURE — 94799 UNLISTED PULMONARY SVC/PX: CPT

## 2024-11-04 PROCEDURE — 0503F POSTPARTUM CARE VISIT: CPT | Performed by: NURSE PRACTITIONER

## 2024-11-04 PROCEDURE — 94664 DEMO&/EVAL PT USE INHALER: CPT

## 2024-11-04 RX ORDER — MEDROXYPROGESTERONE ACETATE 150 MG/ML
150 INJECTION, SUSPENSION INTRAMUSCULAR ONCE
Status: COMPLETED | OUTPATIENT
Start: 2024-11-05 | End: 2024-11-05

## 2024-11-04 RX ADMIN — IBUPROFEN 600 MG: 600 TABLET, FILM COATED ORAL at 11:30

## 2024-11-04 RX ADMIN — DOCUSATE SODIUM 100 MG: 100 CAPSULE, LIQUID FILLED ORAL at 20:58

## 2024-11-04 RX ADMIN — SIMETHICONE 80 MG: 80 TABLET, CHEWABLE ORAL at 23:40

## 2024-11-04 RX ADMIN — ACETAMINOPHEN 325MG 650 MG: 325 TABLET ORAL at 20:58

## 2024-11-04 RX ADMIN — SIMETHICONE 80 MG: 80 TABLET, CHEWABLE ORAL at 03:37

## 2024-11-04 RX ADMIN — ENOXAPARIN SODIUM 40 MG: 100 INJECTION SUBCUTANEOUS at 01:26

## 2024-11-04 RX ADMIN — BUDESONIDE AND FORMOTEROL FUMARATE DIHYDRATE 2 PUFF: 160; 4.5 AEROSOL RESPIRATORY (INHALATION) at 07:29

## 2024-11-04 RX ADMIN — ENOXAPARIN SODIUM 40 MG: 100 INJECTION SUBCUTANEOUS at 14:38

## 2024-11-04 RX ADMIN — INSULIN GLARGINE 22 UNITS: 100 INJECTION, SOLUTION SUBCUTANEOUS at 22:20

## 2024-11-04 RX ADMIN — BUDESONIDE AND FORMOTEROL FUMARATE DIHYDRATE 2 PUFF: 160; 4.5 AEROSOL RESPIRATORY (INHALATION) at 20:59

## 2024-11-04 RX ADMIN — AMLODIPINE BESYLATE 10 MG: 10 TABLET ORAL at 20:58

## 2024-11-04 RX ADMIN — IBUPROFEN 600 MG: 600 TABLET, FILM COATED ORAL at 05:03

## 2024-11-04 RX ADMIN — SIMETHICONE 80 MG: 80 TABLET, CHEWABLE ORAL at 11:30

## 2024-11-04 RX ADMIN — ACETAMINOPHEN 325MG 650 MG: 325 TABLET ORAL at 01:43

## 2024-11-04 RX ADMIN — ACETAMINOPHEN 325MG 650 MG: 325 TABLET ORAL at 14:37

## 2024-11-04 RX ADMIN — IBUPROFEN 600 MG: 600 TABLET, FILM COATED ORAL at 17:04

## 2024-11-04 RX ADMIN — ACETAMINOPHEN 325MG 650 MG: 325 TABLET ORAL at 08:25

## 2024-11-04 RX ADMIN — INSULIN GLARGINE 22 UNITS: 100 INJECTION, SOLUTION SUBCUTANEOUS at 08:24

## 2024-11-04 RX ADMIN — IBUPROFEN 600 MG: 600 TABLET, FILM COATED ORAL at 23:40

## 2024-11-04 RX ADMIN — METFORMIN HYDROCHLORIDE 1000 MG: 500 TABLET, EXTENDED RELEASE ORAL at 17:04

## 2024-11-04 RX ADMIN — METFORMIN HYDROCHLORIDE 1000 MG: 500 TABLET, EXTENDED RELEASE ORAL at 08:25

## 2024-11-04 RX ADMIN — DOCUSATE SODIUM 100 MG: 100 CAPSULE, LIQUID FILLED ORAL at 08:25

## 2024-11-04 NOTE — PLAN OF CARE
Problem: Adult Inpatient Plan of Care  Goal: Plan of Care Review  Outcome: Progressing  Flowsheets (Taken 11/4/2024 0703)  Progress: improving  Outcome Evaluation: VSS, lochia WNL, up ab sujey, voiding, pain well controlled, pumping and supplementing with neosure  Plan of Care Reviewed With: patient  Goal: Patient-Specific Goal (Individualized)  Outcome: Progressing  Goal: Absence of Hospital-Acquired Illness or Injury  Outcome: Progressing  Intervention: Identify and Manage Fall Risk  Recent Flowsheet Documentation  Taken 11/4/2024 0645 by Karla Simmons RN  Safety Promotion/Fall Prevention: safety round/check completed  Taken 11/4/2024 0503 by Karla Simmons RN  Safety Promotion/Fall Prevention: safety round/check completed  Taken 11/4/2024 0320 by Karla Simmons RN  Safety Promotion/Fall Prevention: safety round/check completed  Taken 11/4/2024 0230 by Karla Simmons RN  Safety Promotion/Fall Prevention: safety round/check completed  Taken 11/4/2024 0143 by Karla Simmons RN  Safety Promotion/Fall Prevention: safety round/check completed  Taken 11/3/2024 2250 by Karla Simmons RN  Safety Promotion/Fall Prevention: safety round/check completed  Taken 11/3/2024 2120 by Karla Simmons RN  Safety Promotion/Fall Prevention: safety round/check completed  Goal: Optimal Comfort and Wellbeing  Outcome: Progressing  Intervention: Monitor Pain and Promote Comfort  Recent Flowsheet Documentation  Taken 11/4/2024 0503 by Karla Simmons RN  Pain Management Interventions: pain medication given  Taken 11/4/2024 0143 by Karla Simmons RN  Pain Management Interventions: pain medication given  Intervention: Provide Person-Centered Care  Recent Flowsheet Documentation  Taken 11/3/2024 2120 by Karla Simmons RN  Trust Relationship/Rapport:   care explained   choices provided   reassurance provided  Goal: Readiness for  Transition of Care  Outcome: Progressing   Goal Outcome Evaluation:  Plan of Care Reviewed With: patient        Progress: improving  Outcome Evaluation: VSS, lochia WNL, up ab sujey, voiding, pain well controlled, pumping and supplementing with neosure

## 2024-11-04 NOTE — LACTATION NOTE
Pt reports she has been formula feeding until her milk comes in. She does reports that baby has latched good since delivery. She reports not using hgp but plans to start today. Discussed latching baby every 2-3 hours and then giving supplement if she chooses or pumping every 3 hours. Educated on supply and demand. Encouraged to call LC as needed.    Lactation Consult Note    Evaluation Completed: 2024 09:03 EST  Patient Name: Lavern Rehman  :  1985  MRN:  [id number]     REFERRAL  INFORMATION:                                         DELIVERY HISTORY:        Skin to skin initiation date/time: 2024 11:12 AM  Skin to skin end date/time:           MATERNAL ASSESSMENT:                               INFANT ASSESSMENT:  Information for the patient's :  Yadira Rehman [[id number]]   No past medical history on file.                                                                                                  MATERNAL INFANT FEEDING:                                                                       EQUIPMENT TYPE:                                 BREAST PUMPING:          LACTATION REFERRALS:

## 2024-11-04 NOTE — LACTATION NOTE
Gave pt personal breast pump    Lactation Consult Note    Evaluation Completed: 2024 13:43 EST  Patient Name: Lavern Rehman  :  1985  MRN:  [id number]     REFERRAL  INFORMATION:                                         DELIVERY HISTORY:        Skin to skin initiation date/time: 2024 11:12 AM  Skin to skin end date/time:           MATERNAL ASSESSMENT:                               INFANT ASSESSMENT:  Information for the patient's :  Yadira Rehman [[id number]]   No past medical history on file.                                                                                                  MATERNAL INFANT FEEDING:                                                                       EQUIPMENT TYPE:                                 BREAST PUMPING:          LACTATION REFERRALS:

## 2024-11-04 NOTE — PROGRESS NOTES
Section Progress Note    Assessment & Plan     Status post  section: Doing well postoperatively.   Continue current care. Anticipate discharge home tomorrow. Planning DMPA prior to discharge. Up to shower, remove dressing. Keep IV in place    2.  DVT Prophylaxis: BMI 49.26.  SCD's while at rest. Encouraged ambulation. On Lovenox.     3. CHTN with superimposed pre-eclampsia: S/p magnesium sulfate for seizure prophylaxis. This was d/c'd yesterday at 1059 AM. Denies HA, vision changes, RUQ pain. She is currently on amlodipine. Will continue to monitor. Hopefully will be able to go home tomorrow.    4. T2D: FBS 76. Has not been following diabetic diet. Lantus restarted yesterday, with improvement in glucose. Also on metformin.     5. Asthma: Well controlled on Symbicort. Lungs clear. Incentive spirometer at bedside.      Hgb: 13.1-->10.8  Rh status: A+  Syphilis screen in hospital: non reactive  Rubella: Immune, MMR ordered  Gender: female    Subjective     Postpartum Day 2:  Delivery    The patient feels well. The patient denies emotional concerns. Pain is well controlled with current medications. The baby is well.Urinary output is adequate. The patient is ambulating well. The patient is tolerating a normal diet. Patient reports passing flatus.    Objective     Vital signs in last 24 hours:  Temp:  [97.8 °F (36.6 °C)-98.1 °F (36.7 °C)] 97.8 °F (36.6 °C)  Heart Rate:  [80-94] 83  Resp:  [16-18] 17  BP: (129-148)/(59-80) 137/78    General:    alert, appears stated age, and cooperative   CV: RRR, no m/r/g   Lungs: CTAB, no wheezes, no respiratory distress   Bowel Sounds:  active   Lochia:  appropriate   Uterine Fundus:   firm   Incision:  Dressing in place, clean and dry   DVT Evaluation:  No evidence of DVT seen on physical exam.     Lab Results   Component Value Date    WBC 9.81 2024    HGB 10.8 (L) 2024    HCT 34.3 2024    MCV 88.2 2024     2024  Addended by: CAMDEN YEE on: 9/7/2023 11:27 AM     Modules accepted: Orders             Tamiko Villarreal, APRN  11/4/2024  08:15 EST

## 2024-11-04 NOTE — PLAN OF CARE
Goal Outcome Evaluation:           Progress: improving  Outcome Evaluation: VSS, pain well controlled, ambualting well, showered this AM, breastfeeding and supplementing well

## 2024-11-04 NOTE — LACTATION NOTE
PT is now on M/B has a HGP at the bedside.Reports she continues attempting to BF and baby is doing well, she is also pumping and giving formula.Encouraged PT to try BF baby every 2-3 h. or PRN and always to offer breast first and then bottle.  Educated on the importance of stimulation for adequate milk supply. Instructions on how to rouse infant for feeding also given. Mom needs PBP and LC faxed the prescription.She denies any questions. Encouraged to call if needing help.

## 2024-11-04 NOTE — PROGRESS NOTES
"Discharge Planning Assessment  [facility]     Patient Name: Lavern Rehman  MRN: [id number]  Today's Date: 2024    Admit Date: 10/31/2024    Plan: Infant may discharge to mother when medically ready; CSW will follow cord tox. HUMBERTO Lindsay.   Discharge Needs Assessment    No documentation.                  Discharge Plan       Row Name 24 1411       Plan    Plan Infant may discharge to mother when medically ready; CSW will follow cord tox. HUMBERTO Lindsay.    Plan Comments Mother: Lavern Rehman, MRN: [id number]; infant: Yadira \"Komal\" Ori, MRN: [id number]. CSW consulted for \"Potential  Drug Exposure.\" Of note, mother's UDS was negative prenatally on 4/15 and on admit. Infant's UDS was missed; CSW requested for cord toxicology to be sent. CSW spoke to mother's RN, who reports mother has no psychosocial needs or concerns at this time. CSW made the decision that mother did not require a needs-based assessment at this time. CSW will follow cord toxicology, and complete mandated reporting to CPS if warranted. HUMBERTO Lindsay.                  Continued Care and Services - Admitted Since 10/31/2024    No active coordination exists for this encounter.          Demographic Summary       Row Name 24 1410       General Information    Admission Type inpatient    Arrived From home    Referral Source nursing    Reason for Consult substance use concerns    General Information Comments Potential  Drug Exposure                   Functional Status    No documentation.                  Psychosocial    No documentation.                  Abuse/Neglect    No documentation.                  Legal    No documentation.                  Substance Abuse    No documentation.                  Patient Forms    No documentation.                     ANU Sommer    "

## 2024-11-05 LAB
GLUCOSE BLDC GLUCOMTR-MCNC: 114 MG/DL (ref 70–130)
GLUCOSE BLDC GLUCOMTR-MCNC: 145 MG/DL (ref 70–130)
GLUCOSE BLDC GLUCOMTR-MCNC: 71 MG/DL (ref 70–130)
GLUCOSE BLDC GLUCOMTR-MCNC: 72 MG/DL (ref 70–130)

## 2024-11-05 PROCEDURE — 94799 UNLISTED PULMONARY SVC/PX: CPT

## 2024-11-05 PROCEDURE — 94761 N-INVAS EAR/PLS OXIMETRY MLT: CPT

## 2024-11-05 PROCEDURE — 63710000001 INSULIN GLARGINE PER 5 UNITS: Performed by: OBSTETRICS & GYNECOLOGY

## 2024-11-05 PROCEDURE — 25010000002 ENOXAPARIN PER 10 MG: Performed by: OBSTETRICS & GYNECOLOGY

## 2024-11-05 PROCEDURE — 25010000002 ONDANSETRON PER 1 MG: Performed by: ANESTHESIOLOGY

## 2024-11-05 PROCEDURE — 94664 DEMO&/EVAL PT USE INHALER: CPT

## 2024-11-05 PROCEDURE — 25010000002 MEDROXYPROGESTERONE 150 MG/ML SUSPENSION: Performed by: NURSE PRACTITIONER

## 2024-11-05 PROCEDURE — 0503F POSTPARTUM CARE VISIT: CPT | Performed by: NURSE PRACTITIONER

## 2024-11-05 PROCEDURE — 82948 REAGENT STRIP/BLOOD GLUCOSE: CPT

## 2024-11-05 RX ADMIN — IBUPROFEN 600 MG: 600 TABLET, FILM COATED ORAL at 12:07

## 2024-11-05 RX ADMIN — MEDROXYPROGESTERONE ACETATE 150 MG: 150 INJECTION, SUSPENSION INTRAMUSCULAR at 00:59

## 2024-11-05 RX ADMIN — SIMETHICONE 80 MG: 80 TABLET, CHEWABLE ORAL at 17:38

## 2024-11-05 RX ADMIN — AMLODIPINE BESYLATE 10 MG: 10 TABLET ORAL at 20:38

## 2024-11-05 RX ADMIN — DOCUSATE SODIUM 100 MG: 100 CAPSULE, LIQUID FILLED ORAL at 08:15

## 2024-11-05 RX ADMIN — METFORMIN HYDROCHLORIDE 1000 MG: 500 TABLET, EXTENDED RELEASE ORAL at 08:15

## 2024-11-05 RX ADMIN — METFORMIN HYDROCHLORIDE 1000 MG: 500 TABLET, EXTENDED RELEASE ORAL at 17:38

## 2024-11-05 RX ADMIN — SIMETHICONE 80 MG: 80 TABLET, CHEWABLE ORAL at 12:07

## 2024-11-05 RX ADMIN — ENOXAPARIN SODIUM 40 MG: 100 INJECTION SUBCUTANEOUS at 00:59

## 2024-11-05 RX ADMIN — DOCUSATE SODIUM 100 MG: 100 CAPSULE, LIQUID FILLED ORAL at 20:38

## 2024-11-05 RX ADMIN — ACETAMINOPHEN 325MG 650 MG: 325 TABLET ORAL at 20:38

## 2024-11-05 RX ADMIN — ACETAMINOPHEN 325MG 650 MG: 325 TABLET ORAL at 08:15

## 2024-11-05 RX ADMIN — BUDESONIDE AND FORMOTEROL FUMARATE DIHYDRATE 2 PUFF: 160; 4.5 AEROSOL RESPIRATORY (INHALATION) at 07:31

## 2024-11-05 RX ADMIN — INSULIN GLARGINE 22 UNITS: 100 INJECTION, SOLUTION SUBCUTANEOUS at 08:16

## 2024-11-05 RX ADMIN — ENOXAPARIN SODIUM 40 MG: 100 INJECTION SUBCUTANEOUS at 12:07

## 2024-11-05 RX ADMIN — ACETAMINOPHEN 325MG 650 MG: 325 TABLET ORAL at 15:09

## 2024-11-05 RX ADMIN — INSULIN GLARGINE 22 UNITS: 100 INJECTION, SOLUTION SUBCUTANEOUS at 20:38

## 2024-11-05 RX ADMIN — IBUPROFEN 600 MG: 600 TABLET, FILM COATED ORAL at 17:39

## 2024-11-05 RX ADMIN — IBUPROFEN 600 MG: 600 TABLET, FILM COATED ORAL at 05:26

## 2024-11-05 RX ADMIN — ONDANSETRON 4 MG: 2 INJECTION, SOLUTION INTRAMUSCULAR; INTRAVENOUS at 01:30

## 2024-11-05 RX ADMIN — ACETAMINOPHEN 325MG 650 MG: 325 TABLET ORAL at 03:18

## 2024-11-05 NOTE — PLAN OF CARE
Goal Outcome Evaluation:           Progress: improving  Outcome Evaluation: VSS, pain well controlled, ambulating well, breastfeeding and supplementing well, d/c tomorrow

## 2024-11-05 NOTE — PROGRESS NOTES
PROGRESS NOTE:   POSTOP DAY 4      PATIENT: Lavern Rehman        MR#:[id number]  LOCATION: [facility]  DATE OF ADMISSION: 10/31/2024  ADMISSION  DIAGNOSIS:   Chronic hypertension with superimposed pre-eclampsia with severe features    Primigravida of advanced maternal age in third trimester    Type 2 diabetes mellitus in pregnancy, third trimester     CURRENT DIAGNOSIS: No notes have been filed under this hospital service.  Service: Hospitalist    SERVICE: Obstetrics      Chronic hypertension with superimposed pre-eclampsia with severe features    Primigravida of advanced maternal age in third trimester    Type 2 diabetes mellitus in pregnancy, third trimester        PROCEDURES:  , Low Transverse    2024   10:57 AM     ASSESSMENT/PLAN  Status Post  Delivery Day 4:   Postpartum care immediately following  delivery:  Doing well postoperatively. Meeting all milestones for discharge.    DVT Prophylaxis: BMI 49.26. SCD's while at rest. Encouraged ambulation. On lovenox for DVT prevention.   Hgb: 10.8.  Chronic hypertension with superimposed preeclampsia: post magnesium administration. Currently on Norvasc.   DMII: Not following diabetic diet, Lantus and metformin prescribed yesterday. Fasting blood sugars this morning was 68 and retest was 84. Lantus lowered to 20 units morning and night. Continue lantus 20 units morning and night and metformin after discharge and assess blood sugars at 1 week postpartum visit.    Asthma: Controlled with Symbicort. Incentive spirometer at bedside.   Discharge to home: Discharge instructions given, precautions reviewed. Follow up with [facility] in 1 week for blood pressure check  in 4 to 6 weeks for routine postpartum visit. Prescription for ibuprofen 600mg PO every 6 hours PRN for pain, docusate 100mg PO BID, and hydrocodone/acetaminophen 5/325 every 6 hours PRN for pain. Advised no tampons, menstrual cups, intercourse, or tub baths  for 6 weeks. No driving for 2 weeks.    RH STATUS: A positive  SYPHILIS SCREEN DELIVERY ADMIT: treponemal antibody non-reactive upon admission  RUBELLA: non-immune, MMR ordered to be given on postpartum prior to discharge  VARICELLA: unknown immunity  INFANT GENDER: female    Subjective    39 y.o. yo Female  status post  section day 5 at 36w2d doing well. Lavern denies any emotional concerns. Pain well controlled with current medications. Lochia appropriate. She is tolerating a regular diet and passing flatus. Urinary output has been adequate.     Objective   Vitals  Temp:  Temp:  [97.9 °F (36.6 °C)-98.5 °F (36.9 °C)] 97.9 °F (36.6 °C)  Temp src: Oral  BP:  BP: (107-151)/(61-82) 141/82  Pulse:  Heart Rate:  [79-93] 93  RR:   Resp:  [16-20] 16    General:  Awake, alert, no acute distress   Cardiac: Regular rate and rhythm    Respiratory: Lungs clear bilaterally, normal respiratory effort    Abdomen: Soft, non-distended, fundus firm, below umbilicus, appropriately tender   Incision: Healing well, no dehiscence, no significant drainage, no erythema or exudate   Pelvis: deferred   Extremities: Calves NT bilaterally, DTR 2+, no clonus noted, trace edema     I/O last 3 completed shifts:  In: 900 [P.O.:900]  Out: 4625 [Urine:4625]  Lab Results   Component Value Date    WBC 9.81 2024    HGB 10.8 (L) 2024    HCT 34.3 2024    MCV 88.2 2024     2024     (H) 2023    POCGLU 114 2024    CREATININE 0.79 10/31/2024    URICACID 4.9 2024    AST 10 10/31/2024    ALT 9 10/31/2024     2024    HEPBSAG Negative 04/15/2024     Results from last 7 days   Lab Units 10/31/24  1553   ABO TYPING  A   RH TYPING  Positive   ANTIBODY SCREEN  Negative       Patricia Courtney CNM  2024   12:25 EST

## 2024-11-05 NOTE — DISCHARGE SUMMARY
SECTION DISCHARGE SUMMARY    PATIENT: Lavern Rehman        MR#:[id number]  LOCATION: [facility]  ADMISSION  DIAGNOSIS: Chronic hypertension with superimposed pre-eclampsia  DISCHARGE DIAGNOSIS:   1.  delivery delivered      SERVICE: Obstetrics    DATE OF ADMISSION: 10/31/2024  DATE OF DISCHARGE:  24       Chronic hypertension with superimposed pre-eclampsia with severe features    Primigravida of advanced maternal age in third trimester    Type 2 diabetes mellitus in pregnancy, third trimester     delivery delivered        PROCEDURES:  , Low Transverse    2024   10:57 AM     RH STATUS: A positive  SYPHILIS SCREEN DELIVERY ADMIT: treponemal antibody non-reactive upon admission  RUBELLA: non-immune, MMR ordered to be given on postpartum prior to discharge  VARICELLA: unknown immunity  INFANT GENDER: female    SERVICE: Obstetrics    HOSPITAL COURSE:  Lavern underwent  section of a female infant and remained in the hospital for 6 days. During that time, Lavern remained afebrile and hemodynamically stable. On the day of discharge, Lavern was eating, ambulating, passing flatus, and voiding without difficulty.  Her hemoglobin was 10.8 on postpartum.     LABS:    Lab Results (last 24 hours)       Procedure Component Value Units Date/Time    POC Glucose Once [[id number]]  (Normal) Collected: 24 0756    Specimen: Blood Updated: 24 0757     Glucose 84 mg/dL     POC Glucose Once [[id number]]  (Abnormal) Collected: 24 0732    Specimen: Blood Updated: 24 0734     Glucose 68 mg/dL     POC Glucose Once [[id number]]  (Abnormal) Collected: 24 2042    Specimen: Blood Updated: 24 2044     Glucose 145 mg/dL     POC Glucose Once [[id number]]  (Normal) Collected: 24 1708    Specimen: Blood Updated: 24 1709     Glucose 72 mg/dL     POC Glucose Once [[id number]]  (Normal) Collected: 24 1155    Specimen: Blood Updated: 24  1156     Glucose 114 mg/dL             DISCHARGE MEDICATIONS     Discharge Medications        New Medications        Instructions Start Date   docusate sodium 100 MG capsule   100 mg, Oral, 2 Times Daily      HYDROcodone-acetaminophen 5-325 MG per tablet  Commonly known as: Norco   1 tablet, Oral, Every 6 Hours PRN      ibuprofen 600 MG tablet  Commonly known as: ADVIL,MOTRIN   600 mg, Oral, Every 6 Hours      insulin glargine 100 UNIT/ML injection  Commonly known as: LANTUS, SEMGLEE   20 Units, Subcutaneous, Nightly      insulin glargine 100 UNIT/ML injection  Commonly known as: LANTUS, SEMGLEE   20 Units, Subcutaneous, Daily With Breakfast   Start Date: November 7, 2024            Changes to Medications        Instructions Start Date   Insulin Glargine 100 UNIT/ML injection pen  Commonly known as: LANTUS SOLOSTAR  What changed: additional instructions   Please dispense insulin pen. Variable dosing up to 100 units a day. Starting dose: 35 units AM, 35 units PM. PLEASE PROCESS AS THERAPY CHANGE.      metFORMIN  MG 24 hr tablet  Commonly known as: GLUCOPHAGE-XR  What changed: Another medication with the same name was added. Make sure you understand how and when to take each.   1,000 mg, Oral, Daily With Breakfast & Dinner      metFORMIN  MG 24 hr tablet  Commonly known as: GLUCOPHAGE-XR  What changed: You were already taking a medication with the same name, and this prescription was added. Make sure you understand how and when to take each.   1,000 mg, Oral, 2 Times Daily With Meals             Continue These Medications        Instructions Start Date   amLODIPine 10 MG tablet  Commonly known as: NORVASC   10 mg, Daily      aspirin 81 MG chewable tablet   81 mg, Oral, Daily      budesonide-formoterol 80-4.5 MCG/ACT inhaler  Commonly known as: SYMBICORT   2 puffs, 2 Times Daily      ferrous sulfate 325 (65 FE) MG tablet   325 mg, Daily      glucose blood test strip   Use to check blood sugar 7 times a day.  Patient has a One Touch Ultra 2 glucometer.      glucose monitor monitoring kit   Use to check blood sugars 7 times daily, please dispense compatible lancets and test strips.      nystatin-triamcinolone 028298-9.1 UNIT/GM-% ointment  Commonly known as: MYCOLOG   1 Application, Topical, 2 Times Daily      ONE TOUCH ULTRA 2 w/Device kit       OneTouch Delica Lancets 30G misc   1 Lancet, Other, Take As Directed, Use to check blood sugar 7 times a day.      Lancets 30G misc   To test glucose 7 times daily      pantoprazole 20 MG EC tablet  Commonly known as: PROTONIX   20 mg, Daily      Pen Needles 32G X 4 MM misc   1 Needle, Does not apply, 2 Times Daily, Use to inject insulin up to 5x daily.      prenatal vitamins 27-1 MG tablet tablet   Daily      Proventil  (90 Base) MCG/ACT inhaler  Generic drug: albuterol sulfate HFA   INHALE 1 PUFF EVERY 4 HOURS AS NEEDED.               DISCHARGE DISPOSITION:  Home    DISCHARGE CONDITION: Stable    DISCHARGE DIET: Regular    ACTIVITY AT DISCHARGE: Pelvic rest    INFANT FEEDING PLANS: Breast    EDUCATION: Warning signs and symptoms given, no tub baths, nothing in the vagina for 6 weeks, no driving for 2 weeks while on narcotics.     FOLLOW-UP APPOINTMENTS: Follow up with [facility] in 1 week for blood pressure check  in 4 to 6 weeks for routine postpartum visit.    Patricia Courtney CNM  11/06/24  09:51 EST

## 2024-11-05 NOTE — PLAN OF CARE
Goal Outcome Evaluation:              Outcome Evaluation: VSS, Pain controlled with ERAS meds pts complains of gas pain and mild cramping. Pt recieved her Depo shot and Lovenox early this am. Night time blood sugar was 114, pt given her nightly insulin with no sliding scale needed. BP were slightly elevated, Amlodipine given, MD notified and increased parameters. Mom is feeding baby with formula and baby is doing well.

## 2024-11-05 NOTE — CONSULTS
"Diabetes Education  Assessment/Teaching    Patient Name:  Lavern Rehman  YOB: 1985  MRN: [id number]  Admit Date:  10/31/2024      Assessment Date:  11/5/2024  Flowsheet Row Most Recent Value   General Information     Referral From: MD order   Height 172.7 cm (68\")   Weight 147 kg (324 lb)   Weight Method Standing scale   Diabetes History    What type of diabetes do you have? Type 2  [with gestational DM]   Do you test your blood sugar at home? yes   Education Preferences    Barriers to Learning other (comment)  [None noted]   Assessment Topics    Problem Solving - Assessment Needs education   Monitoring - Assessment Needs education   DM Goals    Problem Solving - Goal 0-30 days from discharge   Monitoring - Goal 0-30 days from discharge   Contact Plan Follow-up medical care       Flowsheet Row Most Recent Value   DM Education Needs    Meter Has own  [Pt states has adequate testing supplies at home.]   Problem Solving Hypoglycemia  [Encouraged to reach out to PCP if fasting glucose gets low to decrease insulin dosages.]   Gestational Postpartum follow up   Discharge Plan Home, Follow-up with PCP   Motivation Engaged   Teaching Method Discussion, Explanation   Patient Response Verbalized understanding       Electronically signed by:  Chrissie Santana RN, [facility]  11/05/24 11:04 EST  "

## 2024-11-05 NOTE — LACTATION NOTE
Pt reports she will probably exclusively pump and bottle feed. She reports pumping 6 cc's of colostrum this morning. She gave baby all pumped colostrum before giving formula supplement. Educated on supply and demand. Encouraged to cont to pump every 3 hours. Educated on when to expect milk to come in. Encouraged to call LC as needed.    Lactation Consult Note    Evaluation Completed: 2024 10:58 EST  Patient Name: Lavern Rehman  :  1985  MRN:  [id number]     REFERRAL  INFORMATION:                                         DELIVERY HISTORY:        Skin to skin initiation date/time: 2024 11:12 AM  Skin to skin end date/time:           MATERNAL ASSESSMENT:                               INFANT ASSESSMENT:  Information for the patient's :  Tatyanachristine Yadira [[id number]]   No past medical history on file.                                                                                                  MATERNAL INFANT FEEDING:                                                                       EQUIPMENT TYPE:                                 BREAST PUMPING:          LACTATION REFERRALS:

## 2024-11-05 NOTE — PROGRESS NOTES
Section Progress Note    Assessment & Plan     Status post  section: Doing well postoperatively.   Continue current care. Anticipate discharge home tomorrow as baby is not able to go home today. S/p DMPA. Keep IV in place    2.  DVT Prophylaxis: BMI 49.26.  SCD's while at rest. Encouraged ambulation. On Lovenox.     3. CHTN with superimposed pre-eclampsia: S/p magnesium sulfate for seizure prophylaxis. BP normal to mild range. Denies HA, vision changes, RUQ pain. She is currently on amlodipine. Will continue to monitor.     4. T2D: FBS 71. Has not been following diabetic diet. Lantus and metformin prescribed. Glucose is improving.     5. Asthma: Well controlled on Symbicort. Lungs clear. Incentive spirometer at bedside.      Hgb: 13.1-->10.8  Rh status: A+  Syphilis screen in hospital: non reactive  Rubella: Immune, MMR ordered  Gender: female    Subjective     Postpartum Day 3:  Delivery    The patient feels well. The patient denies emotional concerns. Pain is well controlled with current medications. The baby is well.Urinary output is adequate. The patient is ambulating well. The patient is tolerating a normal diet. Patient reports passing flatus.    Objective     Vital signs in last 24 hours:  Temp:  [98 °F (36.7 °C)-98.5 °F (36.9 °C)] 98.1 °F (36.7 °C)  Heart Rate:  [79-86] 82  Resp:  [16-20] 16  BP: (107-151)/(61-81) 134/78    General:    alert, appears stated age, and cooperative   CV: RRR, no m/r/g   Lungs: CTAB, no wheezes, no respiratory distress   Bowel Sounds:  active   Lochia:  appropriate   Uterine Fundus:   firm   Incision:  Healing well, no edema, erythema, no drainge   DVT Evaluation:  No evidence of DVT seen on physical exam.     Lab Results   Component Value Date    WBC 9.81 2024    HGB 10.8 (L) 2024    HCT 34.3 2024    MCV 88.2 2024     2024         Tamiko Villarreal, APRN  2024  09:34 EST

## 2024-11-06 VITALS
TEMPERATURE: 97.7 F | BODY MASS INDEX: 44.41 KG/M2 | DIASTOLIC BLOOD PRESSURE: 79 MMHG | RESPIRATION RATE: 17 BRPM | HEART RATE: 83 BPM | SYSTOLIC BLOOD PRESSURE: 138 MMHG | OXYGEN SATURATION: 96 % | HEIGHT: 68 IN | WEIGHT: 293 LBS

## 2024-11-06 LAB
GLUCOSE BLDC GLUCOMTR-MCNC: 68 MG/DL (ref 70–130)
GLUCOSE BLDC GLUCOMTR-MCNC: 84 MG/DL (ref 70–130)

## 2024-11-06 PROCEDURE — 63710000001 INSULIN GLARGINE PER 5 UNITS

## 2024-11-06 PROCEDURE — 90471 IMMUNIZATION ADMIN: CPT | Performed by: OBSTETRICS & GYNECOLOGY

## 2024-11-06 PROCEDURE — 25010000002 ENOXAPARIN PER 10 MG: Performed by: OBSTETRICS & GYNECOLOGY

## 2024-11-06 PROCEDURE — 25010000002 MEASLES, MUMPS & RUBELLA VAC RECONSTITUTED SOLUTION: Performed by: OBSTETRICS & GYNECOLOGY

## 2024-11-06 PROCEDURE — 82948 REAGENT STRIP/BLOOD GLUCOSE: CPT

## 2024-11-06 PROCEDURE — 0503F POSTPARTUM CARE VISIT: CPT

## 2024-11-06 PROCEDURE — 90707 MMR VACCINE SC: CPT | Performed by: OBSTETRICS & GYNECOLOGY

## 2024-11-06 RX ORDER — HYDROCODONE BITARTRATE AND ACETAMINOPHEN 5; 325 MG/1; MG/1
1 TABLET ORAL EVERY 6 HOURS PRN
Qty: 10 TABLET | Refills: 0 | Status: SHIPPED | OUTPATIENT
Start: 2024-11-06

## 2024-11-06 RX ORDER — METFORMIN HYDROCHLORIDE 500 MG/1
1000 TABLET, EXTENDED RELEASE ORAL 2 TIMES DAILY WITH MEALS
Qty: 60 TABLET | Refills: 2 | Status: SHIPPED | OUTPATIENT
Start: 2024-11-06

## 2024-11-06 RX ORDER — INSULIN GLARGINE 100 [IU]/ML
20 INJECTION, SOLUTION SUBCUTANEOUS 2 TIMES DAILY
Qty: 30 ML | Refills: 12 | Status: SHIPPED | OUTPATIENT
Start: 2024-11-06

## 2024-11-06 RX ORDER — PSEUDOEPHEDRINE HCL 30 MG
100 TABLET ORAL 2 TIMES DAILY
Qty: 60 CAPSULE | Refills: 2 | Status: SHIPPED | OUTPATIENT
Start: 2024-11-06

## 2024-11-06 RX ORDER — IBUPROFEN 600 MG/1
600 TABLET, FILM COATED ORAL EVERY 6 HOURS
Qty: 60 TABLET | Refills: 0 | Status: SHIPPED | OUTPATIENT
Start: 2024-11-06

## 2024-11-06 RX ADMIN — IBUPROFEN 600 MG: 600 TABLET, FILM COATED ORAL at 06:04

## 2024-11-06 RX ADMIN — ACETAMINOPHEN 325MG 650 MG: 325 TABLET ORAL at 13:13

## 2024-11-06 RX ADMIN — MEASLES, MUMPS, AND RUBELLA VIRUS VACCINE LIVE 0.5 ML: 1000; 12500; 1000 INJECTION, POWDER, LYOPHILIZED, FOR SUSPENSION SUBCUTANEOUS at 09:23

## 2024-11-06 RX ADMIN — ENOXAPARIN SODIUM 40 MG: 100 INJECTION SUBCUTANEOUS at 13:13

## 2024-11-06 RX ADMIN — IBUPROFEN 600 MG: 600 TABLET, FILM COATED ORAL at 00:16

## 2024-11-06 RX ADMIN — METFORMIN HYDROCHLORIDE 1000 MG: 500 TABLET, EXTENDED RELEASE ORAL at 08:31

## 2024-11-06 RX ADMIN — DOCUSATE SODIUM 100 MG: 100 CAPSULE, LIQUID FILLED ORAL at 08:31

## 2024-11-06 RX ADMIN — ENOXAPARIN SODIUM 40 MG: 100 INJECTION SUBCUTANEOUS at 00:16

## 2024-11-06 RX ADMIN — ACETAMINOPHEN 325MG 650 MG: 325 TABLET ORAL at 04:25

## 2024-11-06 RX ADMIN — INSULIN GLARGINE 20 UNITS: 100 INJECTION, SOLUTION SUBCUTANEOUS at 09:22

## 2024-11-06 NOTE — PLAN OF CARE
Goal Outcome Evaluation:   Patient education complete. Patient ready for discharge to home with . Follow up per MD instruction.

## 2024-11-06 NOTE — LACTATION NOTE
This note was copied from a baby's chart.  P1, 36/2 post Mag. Feeding plan is exclusive pumping and formula. Mom is pumping around 10-15 ml with HGP and baby tolerates ebm and Neosure well. Family plans to discharge tomorrow if baby passes car seat test. Mom is pumping every 3 hours and has no discomfort. Encouraged getting a pumping bra, reading PP handbook late  and bf education; gave OPLC information and Lanolin. She did not have any questions and LC number is on the board for any needs. Has PBP.

## 2024-11-06 NOTE — PLAN OF CARE
Goal Outcome Evaluation:      VSS. Fundus and lochia WNL. Assessments WNL. Bonding well with baby.

## 2024-11-07 NOTE — PROGRESS NOTES
"Continued Stay Note  [facility]     Patient Name: Lavern Rehman  MRN: [id number]  Today's Date: 11/7/2024    Admit Date: 10/31/2024    Plan: Infant may discharge to mother when medically ready; CSW will follow cord tox. HUMBERTO Lindsay.   Discharge Plan       Row Name 11/07/24 1546       Plan    Plan Comments Mother: Lavern Rehman, MRN: [id number]; infant: Yadira \"Komal\" Ori, MRN: [id number]. CSW has reviewed infant's cord toxicology results and infant's cord was negative for substances. Mandated CPS reporting is not required at this time. HUMBERTO Lindsay.                   Discharge Codes    No documentation.                 Expected Discharge Date and Time       Expected Discharge Date Expected Discharge Time    Nov 6, 2024               ANU Sommer    "

## 2024-11-12 ENCOUNTER — OFFICE VISIT (OUTPATIENT)
Dept: OBSTETRICS AND GYNECOLOGY | Facility: CLINIC | Age: 39
End: 2024-11-12
Payer: COMMERCIAL

## 2024-11-12 VITALS
WEIGHT: 293 LBS | HEART RATE: 105 BPM | SYSTOLIC BLOOD PRESSURE: 144 MMHG | BODY MASS INDEX: 44.41 KG/M2 | DIASTOLIC BLOOD PRESSURE: 79 MMHG | HEIGHT: 68 IN

## 2024-11-12 NOTE — PROGRESS NOTES
"        REASON FOR FOLLOWUP/CHIEF COMPLAINT: (Patient is here for her 1 week follow up regarding  delivery)      HISTORY OF PRESENT ILLNESS: Patient is doing well.  Her baby is doing well.  She continues to take amlodipine.  She states that she has had a few elevated blood pressures at home with a headache that is generally relieved by her normal analgesic of Tylenol or ibuprofen.  She attributes it a lot to fatigue as she is pumping every 3 hours and not getting near as much sleep as she would like in this .      Past Medical History, Past Surgical History, Social History, Family History have been reviewed and are without significant changes except as mentioned.    Review of Systems   Review of Systems   Constitutional:  Positive for fatigue. Negative for fever.   Genitourinary:  Negative for pelvic pain and vaginal bleeding.       Medications:  The current medication list was reviewed in the EMR    ALLERGIES:    Allergies   Allergen Reactions    Shellfish-Derived Products Hives     N/v.    Oxycodone Hives and Itching    Shellfish Allergy Hives              Vitals:    24 1039   BP: 144/79   Pulse: 105   Weight: 135 kg (297 lb 12.8 oz)   Height: 172.7 cm (68\")     Physical Exam    CONSTITUTIONAL:  Vital signs reviewed.  No distress, looks comfortable.  EYES:  Conjunctiva and lids unremarkable.  PERRLA  EARS,NOSE,MOUTH,THROAT:  Ears and nose appear unremarkable.  Lips, teeth, gums appear unremarkable.  RESPIRATORY:  Normal respiratory effort.  Lungs clear to auscultation bilaterally.  CARDIOVASCULAR:  Normal S1, S2.  No murmurs rubs or gallops.  No significant lower extremity edema.  GASTROINTESTINAL: Abdomen appears unremarkable.  Nontender.  No hepatomegaly.  No splenomegaly.  Incision shows good approximation with no evidence of edema or erythema or separation.  NEURO: cranial nerves 2-12 grossly intact.  No focal deficits.  Appears to have equal strength all 4 extremities.  MUSCULOSKELETAL:  " Unremarkable digits/nails.  No cyanosis or clubbing.  SKIN:  Warm.  No rashes.  PSYCHIATRIC:  Normal judgment and insight.  Normal mood and affect.       RECENT LABS:        ASSESSMENT/PLAN:  Lavern Rehman [unfilled]   Postpartum hypertension, stable now with blood pressure 144/79 on her current amlodipine.  We gave her parameters of 160/105 and headache not responsive at all to her normal analgesics as reasons to call.  She has diuresed very well since prior to delivery with loss of about 30 pounds in that timeframe.  Will see her back in 4 weeks for  Answers submitted by the patient for this visit:  Other (Submitted on 11/5/2024)  Please describe your symptoms.: Light cramps and bleeding after c section  Have you had these symptoms before?: No  How long have you been having these symptoms?: 1-4 days  Primary Reason for Visit (Submitted on 11/5/2024)  What is the primary reason for your visit?: Problem Not Listed

## 2024-11-15 ENCOUNTER — MATERNAL SCREENING (OUTPATIENT)
Dept: CALL CENTER | Facility: HOSPITAL | Age: 39
End: 2024-11-15
Payer: COMMERCIAL

## 2024-11-15 NOTE — OUTREACH NOTE
Maternal Screening Survey      Flowsheet Row Responses   [facility]   [facility]? Yes   Call start time 1243   Call end time 1244   I have been able to laugh and see the funny side of things. 0   I have looked forward with enjoyment to things. 0   I have blamed myself unnecessarily when things went wrong. 0   I have been anxious or worried for no good reason. 0   I have felt scared or panicky for no good reason. 0   Things have been getting on top of me. 0   I have been so unhappy that I have had difficulty sleeping. 0   I have felt sad or miserable. 0   I have been so unhappy that I have been crying. 0   The thought of harming myself has occurred to me. 0   [place]  Depression Scale Total 0   Did any of your parents have problems with alcohol or drug use? No   Do any of your peers have problems with alcohol or drug use? No   Does your partner have problems with alcohol or drug use? No   Before you were pregnant did you have problems with alcohol or drug use? (past) No   In the past month, did you drink beer, wine, liquor or use any other drugs? (pregnancy) No   Maternal Screening call completed Yes   Call end time 1244              Nia BEST - Registered Nurse

## 2024-12-10 ENCOUNTER — POSTPARTUM VISIT (OUTPATIENT)
Dept: OBSTETRICS AND GYNECOLOGY | Facility: CLINIC | Age: 39
End: 2024-12-10
Payer: COMMERCIAL

## 2024-12-10 VITALS
WEIGHT: 293 LBS | SYSTOLIC BLOOD PRESSURE: 146 MMHG | HEIGHT: 68 IN | DIASTOLIC BLOOD PRESSURE: 85 MMHG | HEART RATE: 118 BPM | BODY MASS INDEX: 44.41 KG/M2

## 2024-12-10 RX ORDER — MEDROXYPROGESTERONE ACETATE 150 MG/ML
150 INJECTION, SUSPENSION INTRAMUSCULAR
Qty: 1 ML | Refills: 3 | Status: SHIPPED | OUTPATIENT
Start: 2024-12-10

## 2024-12-10 NOTE — PROGRESS NOTES
"        REASON FOR FOLLOWUP/CHIEF COMPLAINT:  (Pt here for 6 wk post op from  on 24)      HISTORY OF PRESENT ILLNESS: Patient is seen for 6-week follow-up visit.  She did start her first menses.  She did receive Depo-Provera in the hospital.  She is bottlefeeding.  Her glucose checks have been good and she has an appointment to see her primary care provider.  She does describe some right lateral abdominal wall numbness that has persisted after delivery and was present before the delivery.  She also has some evening nausea and has already had a cholecystectomy.  I asked her to address those things with her primary care provider as they are not typical post pregnancy issues.      Past Medical History, Past Surgical History, Social History, Family History have been reviewed and are without significant changes except as mentioned.    Review of Systems   Review of Systems   Constitutional:  Positive for fatigue. Negative for chills, diaphoresis and fever.   HENT:  Negative for congestion and sore throat.    Respiratory:  Negative for cough.    Cardiovascular:  Negative for chest pain.   Gastrointestinal:  Positive for nausea. Negative for abdominal pain and vomiting.   Genitourinary:  Negative for dysuria.   Musculoskeletal:  Positive for myalgias. Negative for neck pain.   Skin:  Negative for rash.   Neurological:  Positive for numbness and headaches. Negative for weakness.       Medications:  The current medication list was reviewed in the EMR    ALLERGIES:    Allergies   Allergen Reactions    Shellfish-Derived Products Hives     N/v.    Oxycodone Hives and Itching    Shellfish Allergy Hives              Vitals:    12/10/24 1116   BP: 146/85   Pulse: 118   Weight: (!) 137 kg (301 lb)   Height: 172.7 cm (68\")     Physical Exam    CONSTITUTIONAL:  Vital signs reviewed.  No distress, looks comfortable.  EYES:  Conjunctiva and lids unremarkable.  PERRLA  EARS,NOSE,MOUTH,THROAT:  Ears and nose appear " unremarkable.  Lips, teeth, gums appear unremarkable.  RESPIRATORY:  Normal respiratory effort.  Lungs clear to auscultation bilaterally.  CARDIOVASCULAR:  Normal S1, S2.  No murmurs rubs or gallops.  No significant lower extremity edema.  GASTROINTESTINAL: Abdomen appears unremarkable.  Nontender.  No hepatomegaly.  No splenomegaly.  Incision is intact.  NEURO: cranial nerves 2-12 grossly intact.  No focal deficits.  Appears to have equal strength all 4 extremities.  MUSCULOSKELETAL:  Unremarkable digits/nails.  No cyanosis or clubbing.  SKIN:  Warm.  No rashes.  PSYCHIATRIC:  Normal judgment and insight.  Normal mood and affect.       RECENT LABS:        ASSESSMENT/PLAN:  Lavern L Arnchristine [unfilled]   1.  Postpartum follow-up doing well from a blood pressure and diabetes standpoint.  2.  Postpartum anxiety/depression.  Patient wishes to begin sertraline and we will start that at 50 mg daily.  3.  Contraception management, will continue Depo-Provera at this time.  Answers submitted by the patient for this visit:  Primary Reason for Visit (Submitted on 12/4/2024)  What is the primary reason for your visit?: Problem Not Listed  Problem not listed (Submitted on 12/4/2024)  Chief Complaint: Other medical problem  Reason for appointment: Post op follow up  anorexia: No  joint pain: No  change in stool: Yes  joint swelling: No  swollen glands: No  vertigo: No  visual change: No  Onset: 1 to 6 months  Chronicity: new  Frequency: intermittently  Medications tried: Ibuprofen

## 2024-12-20 ENCOUNTER — PATIENT ROUNDING (BHMG ONLY) (OUTPATIENT)
Dept: FAMILY MEDICINE CLINIC | Facility: CLINIC | Age: 39
End: 2024-12-20
Payer: COMMERCIAL

## 2024-12-20 ENCOUNTER — OFFICE VISIT (OUTPATIENT)
Dept: FAMILY MEDICINE CLINIC | Facility: CLINIC | Age: 39
End: 2024-12-20
Payer: COMMERCIAL

## 2024-12-20 VITALS
HEART RATE: 113 BPM | BODY MASS INDEX: 44.41 KG/M2 | DIASTOLIC BLOOD PRESSURE: 100 MMHG | SYSTOLIC BLOOD PRESSURE: 160 MMHG | WEIGHT: 293 LBS | OXYGEN SATURATION: 99 % | HEIGHT: 68 IN | TEMPERATURE: 98.9 F

## 2024-12-20 DIAGNOSIS — E11.65 TYPE 2 DIABETES MELLITUS WITH HYPERGLYCEMIA, WITHOUT LONG-TERM CURRENT USE OF INSULIN: ICD-10-CM

## 2024-12-20 DIAGNOSIS — I10 PRIMARY HYPERTENSION: ICD-10-CM

## 2024-12-20 DIAGNOSIS — B37.2 CANDIDIASIS, INTERTRIGO: ICD-10-CM

## 2024-12-20 DIAGNOSIS — K21.9 GASTROESOPHAGEAL REFLUX DISEASE, UNSPECIFIED WHETHER ESOPHAGITIS PRESENT: ICD-10-CM

## 2024-12-20 DIAGNOSIS — Z00.00 ANNUAL PHYSICAL EXAM: Primary | ICD-10-CM

## 2024-12-20 PROCEDURE — 99214 OFFICE O/P EST MOD 30 MIN: CPT | Performed by: NURSE PRACTITIONER

## 2024-12-20 PROCEDURE — 99385 PREV VISIT NEW AGE 18-39: CPT | Performed by: NURSE PRACTITIONER

## 2024-12-20 RX ORDER — KETOCONAZOLE 20 MG/G
1 CREAM TOPICAL DAILY
Qty: 60 G | Refills: 1 | Status: SHIPPED | OUTPATIENT
Start: 2024-12-20

## 2024-12-20 RX ORDER — MEDROXYPROGESTERONE ACETATE 10 MG
10 TABLET ORAL DAILY
Qty: 10 TABLET | Refills: 2 | Status: SHIPPED | OUTPATIENT
Start: 2024-12-20 | End: 2024-12-30

## 2024-12-20 RX ORDER — PANTOPRAZOLE SODIUM 20 MG/1
20 TABLET, DELAYED RELEASE ORAL DAILY
Qty: 90 TABLET | Refills: 3 | Status: SHIPPED | OUTPATIENT
Start: 2024-12-20

## 2024-12-20 NOTE — PROGRESS NOTES
"Chief Complaint  Annual Exam (Patient is fasting) and Establish Care    Subjective        Lavern Rehman presents to [facility]  History of Present Illness  Patient is here to establish care. She previously seen Jori RUBI. She recently gave birth 7 weeks ago. She did have gestational diabetes and pre-eclampsia. She had her birth 4 weeks early due to issues. She did have a preexisting diagnosis of high blood pressure and type 2 diabetes with oral medications. She does have acid reflux, has never been tested for sleep apnea.     She states she takes her bp meds at night. She does have a monitor at home but doesn't take regularly. She was previously taking a combo bp pill. She does feel it may be slightly higher due to some sleep deprivation because of the .      Patient also has asthma.; She uses a daily maintenance and rescue inhalers.     She has a chronic issue with a mild yeast infection under her stomach fold. She uses ketoconazole prn and that helps.     Declines vaccination today.   Objective   Vital Signs:  /100 (BP Location: Left arm, Patient Position: Sitting, Cuff Size: Large Adult)   Pulse 113   Temp 98.9 °F (37.2 °C) (Temporal)   Ht 172.7 cm (68\")   Wt 134 kg (295 lb 12.8 oz)   SpO2 99%   BMI 44.98 kg/m²   Estimated body mass index is 44.98 kg/m² as calculated from the following:    Height as of this encounter: 172.7 cm (68\").    Weight as of this encounter: 134 kg (295 lb 12.8 oz).    Class 3 Severe Obesity (BMI >=40). Obesity-related health conditions include the following: obstructive sleep apnea, hypertension, diabetes mellitus, and GERD. Obesity is unchanged. BMI is is above average; BMI management plan is completed. We discussed portion control, increasing exercise, an aranza-based approach such as [employer] or Lose It, and Information on healthy weight added to patient's after visit summary.      Physical Exam  Constitutional:       Appearance: " Normal appearance. She is obese.   HENT:      Head: Normocephalic.   Eyes:      Extraocular Movements: Extraocular movements intact.      Pupils: Pupils are equal, round, and reactive to light.   Cardiovascular:      Rate and Rhythm: Normal rate and regular rhythm.      Heart sounds: Normal heart sounds.   Pulmonary:      Effort: Pulmonary effort is normal.      Breath sounds: Normal breath sounds.   Musculoskeletal:         General: Normal range of motion.      Cervical back: Normal range of motion.   Skin:     General: Skin is warm and dry.   Neurological:      General: No focal deficit present.      Mental Status: She is alert and oriented to person, place, and time.   Psychiatric:         Mood and Affect: Mood normal.        Result Review :                Assessment and Plan   Diagnoses and all orders for this visit:    1. Annual physical exam (Primary)    2. Primary hypertension  -     Lipid Panel    3. Type 2 diabetes mellitus with hyperglycemia, without long-term current use of insulin  -     Hemoglobin A1c    4. Candidiasis, intertrigo  -     ketoconazole (NIZORAL) 2 % cream; Apply 1 Application topically to the appropriate area as directed Daily.  Dispense: 60 g; Refill: 1    5. Gastroesophageal reflux disease, unspecified whether esophagitis present  -     pantoprazole (PROTONIX) 20 MG EC tablet; Take 1 tablet by mouth Daily.  Dispense: 90 tablet; Refill: 3    Patient is to take bp readings twice daily for 7-14 days and report to provider to re-evaluate bp medications.          Follow Up   Return in about 3 months (around 3/20/2025) for Recheck.  Patient was given instructions and counseling regarding her condition or for health maintenance advice. Please see specific information pulled into the AVS if appropriate.

## 2024-12-21 LAB
CHOLEST SERPL-MCNC: 198 MG/DL (ref 0–200)
HBA1C MFR BLD: 9 % (ref 4.8–5.6)
HDLC SERPL-MCNC: 48 MG/DL (ref 40–60)
LDLC SERPL CALC-MCNC: 119 MG/DL (ref 0–100)
TRIGL SERPL-MCNC: 179 MG/DL (ref 0–150)
VLDLC SERPL CALC-MCNC: 31 MG/DL (ref 5–40)

## 2024-12-23 ENCOUNTER — TELEPHONE (OUTPATIENT)
Dept: FAMILY MEDICINE CLINIC | Facility: CLINIC | Age: 39
End: 2024-12-23
Payer: COMMERCIAL

## 2024-12-23 DIAGNOSIS — E11.65 TYPE 2 DIABETES MELLITUS WITH HYPERGLYCEMIA, WITHOUT LONG-TERM CURRENT USE OF INSULIN: Primary | ICD-10-CM

## 2024-12-23 RX ORDER — PIOGLITAZONE 15 MG/1
15 TABLET ORAL DAILY
Qty: 90 TABLET | Refills: 1 | Status: SHIPPED | OUTPATIENT
Start: 2024-12-23

## 2024-12-23 NOTE — TELEPHONE ENCOUNTER
LMTCB    **HUB/FO** MAY RELAY MESSAGE      ----- Message from Valeri Mitchell sent at 12/23/2024 10:08 AM EST -----    Left message for patient to call back. Let her know her A1c is up to 9 from 6.5 at last check in April in our records. I sent a second oral medication called Jardiance for her to start taking to help lower that A1c. The medication may also help her lose weight. Please follow up in 3 months to recheck, thanks.

## 2024-12-27 ENCOUNTER — TELEPHONE (OUTPATIENT)
Dept: FAMILY MEDICINE CLINIC | Facility: CLINIC | Age: 39
End: 2024-12-27
Payer: COMMERCIAL

## 2025-01-09 RX ORDER — ETHYNODIOL DIACETATE AND ETHINYL ESTRADIOL 1 MG-35MCG
1 KIT ORAL DAILY
Qty: 90 TABLET | Refills: 4 | Status: SHIPPED | OUTPATIENT
Start: 2025-01-09 | End: 2026-01-09

## 2025-01-20 RX ORDER — METFORMIN HYDROCHLORIDE 500 MG/1
TABLET, EXTENDED RELEASE ORAL
Qty: 360 TABLET | Refills: 2 | OUTPATIENT
Start: 2025-01-20

## 2025-01-30 DIAGNOSIS — E11.65 TYPE 2 DIABETES MELLITUS WITH HYPERGLYCEMIA, WITHOUT LONG-TERM CURRENT USE OF INSULIN: Primary | ICD-10-CM

## 2025-01-30 RX ORDER — METFORMIN HYDROCHLORIDE 500 MG/1
1000 TABLET, EXTENDED RELEASE ORAL 2 TIMES DAILY WITH MEALS
Qty: 360 TABLET | Refills: 1 | Status: SHIPPED | OUTPATIENT
Start: 2025-01-30

## 2025-04-11 RX ORDER — SERTRALINE HYDROCHLORIDE 25 MG/1
25 TABLET, FILM COATED ORAL DAILY
Qty: 30 TABLET | Refills: 0 | Status: SHIPPED | OUTPATIENT
Start: 2025-04-11

## 2025-04-23 ENCOUNTER — OFFICE VISIT (OUTPATIENT)
Dept: FAMILY MEDICINE CLINIC | Facility: CLINIC | Age: 40
End: 2025-04-23
Payer: COMMERCIAL

## 2025-04-23 VITALS
DIASTOLIC BLOOD PRESSURE: 90 MMHG | WEIGHT: 293 LBS | TEMPERATURE: 97.8 F | SYSTOLIC BLOOD PRESSURE: 140 MMHG | HEIGHT: 68 IN | BODY MASS INDEX: 44.41 KG/M2 | HEART RATE: 106 BPM | OXYGEN SATURATION: 97 %

## 2025-04-23 DIAGNOSIS — I10 PRIMARY HYPERTENSION: ICD-10-CM

## 2025-04-23 DIAGNOSIS — E11.65 TYPE 2 DIABETES MELLITUS WITH HYPERGLYCEMIA, WITHOUT LONG-TERM CURRENT USE OF INSULIN: Primary | ICD-10-CM

## 2025-04-23 PROCEDURE — 99214 OFFICE O/P EST MOD 30 MIN: CPT | Performed by: NURSE PRACTITIONER

## 2025-04-23 RX ORDER — AMLODIPINE AND BENAZEPRIL HYDROCHLORIDE 10; 20 MG/1; MG/1
1 CAPSULE ORAL DAILY
Qty: 90 CAPSULE | Refills: 3 | Status: SHIPPED | OUTPATIENT
Start: 2025-04-23

## 2025-04-23 NOTE — PROGRESS NOTES
"Chief Complaint  Diabetes (Patient is fasting)    Subjective        Lavern Rehman presents to [facility]  History of Present Illness  Patient is here to follow up for diabetes. Patient has no concerns, but states she is not eating well. She has had some period issues, with frequent menstrual bleeding. She intends to change her eating habits to help reduce weight and glucose. She has tried wegovy in the past and had panic attack and nausea and took only one injection. She is not interested in injections.     She is weaning down on sertaline due to having postpartum anxiety. Ob is managing.     She does take bp readings very rarely at home. She is getting 140/80's with her home cuff. She used to be on amlodipine-benazapril prior to pregnancy.   Objective   Vital Signs:  /90 (BP Location: Left arm, Patient Position: Sitting, Cuff Size: Large Adult)   Pulse 106   Temp 97.8 °F (36.6 °C) (Temporal)   Ht 172.7 cm (68\")   Wt 135 kg (297 lb 9.6 oz)   SpO2 97%   BMI 45.25 kg/m²   Estimated body mass index is 45.25 kg/m² as calculated from the following:    Height as of this encounter: 172.7 cm (68\").    Weight as of this encounter: 135 kg (297 lb 9.6 oz).            Physical Exam  Constitutional:       Appearance: Normal appearance.   HENT:      Head: Normocephalic.   Cardiovascular:      Rate and Rhythm: Normal rate and regular rhythm.   Pulmonary:      Effort: Pulmonary effort is normal.      Breath sounds: Normal breath sounds.   Musculoskeletal:         General: Normal range of motion.   Feet:      Right foot:      Protective Sensation: 7 sites tested.  6 sites sensed.      Skin integrity: Skin integrity normal.      Left foot:      Protective Sensation: 7 sites tested.  7 sites sensed.      Skin integrity: Skin integrity normal.   Skin:     General: Skin is warm and dry.   Neurological:      General: No focal deficit present.      Mental Status: She is alert and oriented to " person, place, and time.   Psychiatric:         Mood and Affect: Mood normal.        Result Review :                Assessment and Plan   Diagnoses and all orders for this visit:    1. Type 2 diabetes mellitus with hyperglycemia, without long-term current use of insulin (Primary)  -     Cancel: Microalbumin / Creatinine Urine Ratio - Urine, Clean Catch; Future  -     Cancel: Hemoglobin A1c; Future  -     Hemoglobin A1c  -     Microalbumin / Creatinine Urine Ratio - Urine, Clean Catch    2. Primary hypertension  -     CBC & Differential  -     amLODIPine-benazepril (LOTREL) 10-20 MG per capsule; Take 1 capsule by mouth Daily.  Dispense: 90 capsule; Refill: 3             Follow Up   Return in about 6 months (around 10/23/2025) for Next scheduled follow up.  Patient was given instructions and counseling regarding her condition or for health maintenance advice. Please see specific information pulled into the AVS if appropriate.

## 2025-04-24 LAB
ALBUMIN/CREAT UR: 161 MG/G CREAT (ref 0–29)
BASOPHILS # BLD AUTO: 0.07 10*3/MM3 (ref 0–0.2)
BASOPHILS NFR BLD AUTO: 0.6 % (ref 0–1.5)
CREAT UR-MCNC: 76.5 MG/DL
EOSINOPHIL # BLD AUTO: 0.18 10*3/MM3 (ref 0–0.4)
EOSINOPHIL NFR BLD AUTO: 1.7 % (ref 0.3–6.2)
ERYTHROCYTE [DISTWIDTH] IN BLOOD BY AUTOMATED COUNT: 13.8 % (ref 12.3–15.4)
HBA1C MFR BLD: 9.6 % (ref 4.8–5.6)
HCT VFR BLD AUTO: 42.2 % (ref 34–46.6)
HGB BLD-MCNC: 13.9 G/DL (ref 12–15.9)
IMM GRANULOCYTES # BLD AUTO: 0.18 10*3/MM3 (ref 0–0.05)
IMM GRANULOCYTES NFR BLD AUTO: 1.7 % (ref 0–0.5)
LYMPHOCYTES # BLD AUTO: 2.01 10*3/MM3 (ref 0.7–3.1)
LYMPHOCYTES NFR BLD AUTO: 18.5 % (ref 19.6–45.3)
MCH RBC QN AUTO: 28.1 PG (ref 26.6–33)
MCHC RBC AUTO-ENTMCNC: 32.9 G/DL (ref 31.5–35.7)
MCV RBC AUTO: 85.3 FL (ref 79–97)
MICROALBUMIN UR-MCNC: 123.1 UG/ML
MONOCYTES # BLD AUTO: 0.51 10*3/MM3 (ref 0.1–0.9)
MONOCYTES NFR BLD AUTO: 4.7 % (ref 5–12)
NEUTROPHILS # BLD AUTO: 7.9 10*3/MM3 (ref 1.7–7)
NEUTROPHILS NFR BLD AUTO: 72.8 % (ref 42.7–76)
NRBC BLD AUTO-RTO: 0 /100 WBC (ref 0–0.2)
PLATELET # BLD AUTO: 419 10*3/MM3 (ref 140–450)
RBC # BLD AUTO: 4.95 10*6/MM3 (ref 3.77–5.28)
WBC # BLD AUTO: 10.85 10*3/MM3 (ref 3.4–10.8)

## 2025-04-29 RX ORDER — ASPIRIN 81 MG
81 TABLET,CHEWABLE ORAL DAILY
Qty: 90 TABLET | Refills: 2 | Status: SHIPPED | OUTPATIENT
Start: 2025-04-29

## 2025-05-21 ENCOUNTER — TELEMEDICINE (OUTPATIENT)
Dept: FAMILY MEDICINE CLINIC | Facility: CLINIC | Age: 40
End: 2025-05-21
Payer: COMMERCIAL

## 2025-05-21 DIAGNOSIS — G89.29 CHRONIC SCAPULAR PAIN: ICD-10-CM

## 2025-05-21 DIAGNOSIS — M89.8X1 CHRONIC SCAPULAR PAIN: ICD-10-CM

## 2025-05-21 DIAGNOSIS — M54.50 CHRONIC BILATERAL LOW BACK PAIN WITHOUT SCIATICA: Primary | ICD-10-CM

## 2025-05-21 DIAGNOSIS — G89.29 CHRONIC BILATERAL LOW BACK PAIN WITHOUT SCIATICA: Primary | ICD-10-CM

## 2025-05-21 PROCEDURE — 99213 OFFICE O/P EST LOW 20 MIN: CPT | Performed by: NURSE PRACTITIONER

## 2025-05-21 RX ORDER — CYCLOBENZAPRINE HCL 5 MG
5 TABLET ORAL 2 TIMES DAILY PRN
Qty: 60 TABLET | Refills: 0 | Status: SHIPPED | OUTPATIENT
Start: 2025-05-21

## 2025-05-21 RX ORDER — MELOXICAM 7.5 MG/1
7.5 TABLET ORAL DAILY
Qty: 30 TABLET | Refills: 1 | Status: SHIPPED | OUTPATIENT
Start: 2025-05-21

## 2025-05-21 NOTE — PROGRESS NOTES
Subjective   Lavern Rehman is a 40 y.o. female.     No chief complaint on file.      History of Present Illness   Patient is wanting to discuss chronic low back pain for about 2 years though she feels it may be worsening due to having to hold infants often. She states she gets 'spasms' on her sides at times near her low back. She feels she has pinched nerves in her shoulder blades. She is having trouble sleeping due to the chronic pain. She has tried all otc med options and also heat. She has seen a chiropracter for this concern. She also has a history of neck pain but currently that is not a concern. She has used muscle relaxers in the past with good results.     You have chosen to receive care through a telehealth visit.  Do you consent to use a video/audio connection for your medical care today? Yes  Video visit completed utilizing [employer].  Patient location home.  Physician location [facility].      The following portions of the patient's history were reviewed and updated as appropriate: allergies, current medications, past family history, past medical history, past social history, past surgical history and problem list.    Depression Screen:      4/23/2025     9:48 AM   PHQ-2/PHQ-9 Depression Screening   Little interest or pleasure in doing things Not at all   Feeling down, depressed, or hopeless Not at all   How difficult have these problems made it for you to do your work, take care of things at home, or get along with other people? Not difficult at all       Past Medical History:   Diagnosis Date    ADHD (attention deficit hyperactivity disorder) 2017    Adhesive capsulitis of left shoulder 09/08/2022    Formatting of this note might be different from the original.   Added automatically from request for surgery [id number]      Anemia 2012    Not sure    Asthma 02/01/1991    Cholelithiasis 2018?    I had gallstones, so they removed my gallbladder but I can't remember when    Chronic hypertension      Congenital cystic kidney disease 2024    Depression     Diagnosed when I was approx 16    Diabetes     Diabetes mellitus, type 2     TERE (generalized anxiety disorder)     GERD (gastroesophageal reflux disease)     Gestational hypertension     HTN (hypertension)     Hypertension     Irritable bowel syndrome     Obesity     PMS (premenstrual syndrome) 2000    Polycystic ovary syndrome     Honestly cant remember when I was first diagnosed but I was still in high school    PONV (postoperative nausea and vomiting)     Varicella     Not sure       Past Surgical History:   Procedure Laterality Date     SECTION N/A 2024    Procedure:  SECTION PRIMARY;  Surgeon: Jag Barrios MD;  Location: [facility] LABOR DELIVERY;  Service: Obstetrics/Gynecology;  Laterality: N/A;    D & C WITH SUCTION      Bled for over 60+ days,not miscarriage    DILATATION AND CURETTAGE      KNEE ARTHROSCOPY W/ MENISCAL REPAIR      KNEE SURGERY      LAPAROSCOPIC CHOLECYSTECTOMY      ROTATOR CUFF REPAIR Left     x2    SHOULDER ARTHROSCOPY W/ LABRAL REPAIR Right     SHOULDER SURGERY         Family History   Problem Relation Age of Onset    Hypertension Father     Breast cancer Maternal Grandmother     Cancer Maternal Grandmother         She had breast cancer in  but it's in remission    Hypertension Maternal Grandfather     Asthma Maternal Grandfather     Ovarian cancer Neg Hx     Uterine cancer Neg Hx     Colon cancer Neg Hx        Social History     Socioeconomic History    Marital status:    Tobacco Use    Smoking status: Former     Current packs/day: 0.00     Average packs/day: 1 pack/day for 13.7 years (13.7 ttl pk-yrs)     Types: Cigarettes     Start date: 2002     Quit date: 2015     Years since quittin.4     Passive exposure: Never    Smokeless tobacco: Never   Vaping Use    Vaping status: Former   Substance and Sexual Activity    Alcohol use: Not Currently     Comment:  Only drink maybe once a month    Drug use: Not Currently     Frequency: 3.0 times per week     Types: Marijuana    Sexual activity: Yes     Partners: Male     Birth control/protection: Birth control pill       Current Outpatient Medications   Medication Sig Dispense Refill    amLODIPine-benazepril (LOTREL) 10-20 MG per capsule Take 1 capsule by mouth Daily. 90 capsule 3    Aspirin Low Dose 81 MG chewable tablet CHEW 1 TABLET BY MOUTH EVERY DAY 90 tablet 2    Blood Glucose Monitoring Suppl (ONE TOUCH ULTRA 2) w/Device kit       budesonide-formoterol (SYMBICORT) 80-4.5 MCG/ACT inhaler Inhale 2 puffs 2 (Two) Times a Day.      cyclobenzaprine (FLEXERIL) 5 MG tablet Take 1 tablet by mouth 2 (Two) Times a Day As Needed for Muscle Spasms. 60 tablet 0    ethynodiol-ethinyl estradiol (KELNOR,ZOVIA) 1-35 MG-MCG per tablet Take 1 tablet by mouth Daily. 90 tablet 4    ferrous sulfate 325 (65 FE) MG tablet Take 1 tablet by mouth Daily.      glucose blood test strip Use to check blood sugar 7 times a day. Patient has a One Touch Ultra 2 glucometer. 200 each 10    glucose monitor monitoring kit Use to check blood sugars 7 times daily, please dispense compatible lancets and test strips. 1 each 0    ketoconazole (NIZORAL) 2 % cream Apply 1 Application topically to the appropriate area as directed Daily. 60 g 1    meloxicam (Mobic) 7.5 MG tablet Take 1 tablet by mouth Daily. 30 tablet 1    metFORMIN ER (GLUCOPHAGE-XR) 500 MG 24 hr tablet Take 2 tablets by mouth 2 (Two) Times a Day With Meals. 360 tablet 1    OneTouch Delica Lancets 30G misc 1 Lancet by Other route Take As Directed. Use to check blood sugar 7 times a day. 200 each 10    pantoprazole (PROTONIX) 20 MG EC tablet Take 1 tablet by mouth Daily. 90 tablet 3    pioglitazone (Actos) 15 MG tablet Take 1 tablet by mouth Daily. 90 tablet 1    prenatal vitamins (PRENATAL 27-1) 27-1 MG tablet tablet Take  by mouth Daily.      PROVENTIL  (90 BASE) MCG/ACT inhaler INHALE 1  PUFF EVERY 4 HOURS AS NEEDED. 6.7 inhaler 0     Current Facility-Administered Medications   Medication Dose Route Frequency Provider Last Rate Last Admin    ondansetron ODT (ZOFRAN-ODT) disintegrating tablet 4 mg  4 mg Translingual Q6H PRN Debra Lyon, CELINE           Review of Systems    Objective   There were no vitals taken for this visit.    Physical Exam   Constitutional: She appears well-developed and well-nourished.   HENT:   Head: Normocephalic.   Pulmonary/Chest: Effort normal.   Neurological: She is alert.   Psychiatric: She has a normal mood and affect.       Recent Results (from the past 12 weeks)   Microalbumin / Creatinine Urine Ratio - Urine, Clean Catch    Collection Time: 04/23/25 10:30 AM    Specimen: Urine, Clean Catch   Result Value Ref Range    Creatinine, Urine 76.5 Not Estab. mg/dL    Microalbumin, Urine 123.1 Not Estab. ug/mL    Microalbumin/Creatinine Ratio 161 (H) 0 - 29 mg/g creat   CBC & Differential    Collection Time: 04/23/25 10:33 AM    Specimen: Blood   Result Value Ref Range    WBC 10.85 (H) 3.40 - 10.80 10*3/mm3    RBC 4.95 3.77 - 5.28 10*6/mm3    Hemoglobin 13.9 12.0 - 15.9 g/dL    Hematocrit 42.2 34.0 - 46.6 %    MCV 85.3 79.0 - 97.0 fL    MCH 28.1 26.6 - 33.0 pg    MCHC 32.9 31.5 - 35.7 g/dL    RDW 13.8 12.3 - 15.4 %    Platelets 419 140 - 450 10*3/mm3    Neutrophil Rel % 72.8 42.7 - 76.0 %    Lymphocyte Rel % 18.5 (L) 19.6 - 45.3 %    Monocyte Rel % 4.7 (L) 5.0 - 12.0 %    Eosinophil Rel % 1.7 0.3 - 6.2 %    Basophil Rel % 0.6 0.0 - 1.5 %    Neutrophils Absolute 7.90 (H) 1.70 - 7.00 10*3/mm3    Lymphocytes Absolute 2.01 0.70 - 3.10 10*3/mm3    Monocytes Absolute 0.51 0.10 - 0.90 10*3/mm3    Eosinophils Absolute 0.18 0.00 - 0.40 10*3/mm3    Basophils Absolute 0.07 0.00 - 0.20 10*3/mm3    Immature Granulocyte Rel % 1.7 (H) 0.0 - 0.5 %    Immature Grans Absolute 0.18 (H) 0.00 - 0.05 10*3/mm3    nRBC 0.0 0.0 - 0.2 /100 WBC   Hemoglobin A1c    Collection Time: 04/23/25 10:33  AM   Result Value Ref Range    Hemoglobin A1C 9.60 (H) 4.80 - 5.60 %     Assessment & Plan   Diagnoses and all orders for this visit:    1. Chronic bilateral low back pain without sciatica (Primary)  -     XR Spine Lumbar AP & Lateral; Future  -     meloxicam (Mobic) 7.5 MG tablet; Take 1 tablet by mouth Daily.  Dispense: 30 tablet; Refill: 1  -     cyclobenzaprine (FLEXERIL) 5 MG tablet; Take 1 tablet by mouth 2 (Two) Times a Day As Needed for Muscle Spasms.  Dispense: 60 tablet; Refill: 0    2. Chronic scapular pain  -     XR Scapula Right; Future  -     XR Scapula Left; Future          Video visit completed.       I spent 10 minutes caring for Lavern on this date of service. This time includes time spent by me in the following activities:obtaining and/or reviewing a separately obtained history, performing a medically appropriate examination and/or evaluation , counseling and educating the patient/family/caregiver, ordering medications, tests, or procedures, and documenting information in the medical record

## 2025-05-22 ENCOUNTER — CLINICAL SUPPORT (OUTPATIENT)
Dept: FAMILY MEDICINE CLINIC | Facility: CLINIC | Age: 40
End: 2025-05-22
Payer: COMMERCIAL

## 2025-05-27 DIAGNOSIS — G89.29 CHRONIC SCAPULAR PAIN: Primary | ICD-10-CM

## 2025-05-27 DIAGNOSIS — M89.8X1 CHRONIC SCAPULAR PAIN: Primary | ICD-10-CM

## 2025-06-20 DIAGNOSIS — G89.29 CHRONIC BILATERAL LOW BACK PAIN WITHOUT SCIATICA: ICD-10-CM

## 2025-06-20 DIAGNOSIS — M54.50 CHRONIC BILATERAL LOW BACK PAIN WITHOUT SCIATICA: ICD-10-CM

## 2025-06-20 RX ORDER — CYCLOBENZAPRINE HCL 5 MG
5 TABLET ORAL 2 TIMES DAILY PRN
Qty: 60 TABLET | Refills: 0 | Status: SHIPPED | OUTPATIENT
Start: 2025-06-20

## 2025-06-20 NOTE — TELEPHONE ENCOUNTER
LOV                   4/23/2025  NOV                   8/1/2025  Last refill             5/21/25  Protocol              met

## 2025-06-21 DIAGNOSIS — E11.65 TYPE 2 DIABETES MELLITUS WITH HYPERGLYCEMIA, WITHOUT LONG-TERM CURRENT USE OF INSULIN: ICD-10-CM

## 2025-06-23 RX ORDER — PIOGLITAZONE 15 MG/1
15 TABLET ORAL DAILY
Qty: 90 TABLET | Refills: 1 | Status: SHIPPED | OUTPATIENT
Start: 2025-06-23

## 2025-07-18 ENCOUNTER — OFFICE VISIT (OUTPATIENT)
Dept: OBSTETRICS AND GYNECOLOGY | Facility: CLINIC | Age: 40
End: 2025-07-18
Payer: COMMERCIAL

## 2025-07-18 VITALS
SYSTOLIC BLOOD PRESSURE: 111 MMHG | HEIGHT: 68 IN | DIASTOLIC BLOOD PRESSURE: 72 MMHG | BODY MASS INDEX: 44.1 KG/M2 | WEIGHT: 291 LBS

## 2025-07-18 DIAGNOSIS — Z01.419 ENCOUNTER FOR GYNECOLOGICAL EXAMINATION WITHOUT ABNORMAL FINDING: Primary | ICD-10-CM

## 2025-07-18 NOTE — PROGRESS NOTES
GYN Annual Exam     CC- Here for annual exam.  (Patient is here for her annual today. Pap  neg)     Lavern Rehman is a 40 y.o. female who presents for annual well woman exam. Periods are regular every 28-30 days, lasting a few days. Dysmenorrhea:none. Cyclic symptoms include none. No intermenstrual bleeding, spotting, or discharge.    OB History          2    Para   1    Term           1    AB   1    Living   1         SAB   1    IAB        Ectopic        Molar        Multiple   0    Live Births   1                Current contraception: OCP (estrogen/progesterone) Kelnor  History of abnormal Pap smear: no  Family history of uterine, colon or ovarian cancer: no  History of abnormal mammogram: no  Family history of breast cancer: yes - maternal grandmother  Last Pap :     Past Medical History:   Diagnosis Date    ADHD (attention deficit hyperactivity disorder) 2017    Adhesive capsulitis of left shoulder 2022    Formatting of this note might be different from the original.   Added automatically from request for surgery [id number]      Anemia     Not sure    Asthma 1991    Cholelithiasis ?    I had gallstones, so they removed my gallbladder but I can't remember when    Chronic hypertension     Congenital cystic kidney disease 2024    Depression 2001    Diagnosed when I was approx 16    Diabetes     Diabetes mellitus, type 2     TERE (generalized anxiety disorder)     GERD (gastroesophageal reflux disease)     Gestational hypertension     HTN (hypertension)     Hypertension     Irritable bowel syndrome     Obesity     PMS (premenstrual syndrome) 2000    Polycystic ovary syndrome 2001    Honestly cant remember when I was first diagnosed but I was still in high school    PONV (postoperative nausea and vomiting)     Varicella     Not sure       Past Surgical History:   Procedure Laterality Date     SECTION N/A 2024    Procedure:  SECTION  PRIMARY;  Surgeon: Jag Barrios MD;  Location: [facility] LABOR DELIVERY;  Service: Obstetrics/Gynecology;  Laterality: N/A;    D & C WITH SUCTION  2009    Bled for over 60+ days,not miscarriage    DILATATION AND CURETTAGE      KNEE ARTHROSCOPY W/ MENISCAL REPAIR      KNEE SURGERY      LAPAROSCOPIC CHOLECYSTECTOMY      ROTATOR CUFF REPAIR Left     x2    SHOULDER ARTHROSCOPY W/ LABRAL REPAIR Right     SHOULDER SURGERY           Current Outpatient Medications:     amLODIPine-benazepril (LOTREL) 10-20 MG per capsule, Take 1 capsule by mouth Daily., Disp: 90 capsule, Rfl: 3    Aspirin Low Dose 81 MG chewable tablet, CHEW 1 TABLET BY MOUTH EVERY DAY, Disp: 90 tablet, Rfl: 2    Blood Glucose Monitoring Suppl (ONE TOUCH ULTRA 2) w/Device kit, , Disp: , Rfl:     budesonide-formoterol (SYMBICORT) 80-4.5 MCG/ACT inhaler, Inhale 2 puffs 2 (Two) Times a Day., Disp: , Rfl:     cyclobenzaprine (FLEXERIL) 5 MG tablet, TAKE 1 TABLET BY MOUTH 2 TIMES A DAY AS NEEDED FOR MUSCLE SPASMS., Disp: 60 tablet, Rfl: 0    ethynodiol-ethinyl estradiol (KELNOR,ZOVIA) 1-35 MG-MCG per tablet, Take 1 tablet by mouth Daily., Disp: 90 tablet, Rfl: 4    ferrous sulfate 325 (65 FE) MG tablet, Take 1 tablet by mouth Daily., Disp: , Rfl:     glucose blood test strip, Use to check blood sugar 7 times a day. Patient has a One Touch Ultra 2 glucometer., Disp: 200 each, Rfl: 10    glucose monitor monitoring kit, Use to check blood sugars 7 times daily, please dispense compatible lancets and test strips., Disp: 1 each, Rfl: 0    ketoconazole (NIZORAL) 2 % cream, Apply 1 Application topically to the appropriate area as directed Daily., Disp: 60 g, Rfl: 1    meloxicam (Mobic) 7.5 MG tablet, Take 1 tablet by mouth Daily., Disp: 30 tablet, Rfl: 1    metFORMIN ER (GLUCOPHAGE-XR) 500 MG 24 hr tablet, Take 2 tablets by mouth 2 (Two) Times a Day With Meals., Disp: 360 tablet, Rfl: 1    OneTouch Delica Lancets 30G misc, 1 Lancet by Other route Take As Directed. Use to  "check blood sugar 7 times a day., Disp: 200 each, Rfl: 10    pantoprazole (PROTONIX) 20 MG EC tablet, Take 1 tablet by mouth Daily., Disp: 90 tablet, Rfl: 3    pioglitazone (ACTOS) 15 MG tablet, TAKE 1 TABLET BY MOUTH EVERY DAY, Disp: 90 tablet, Rfl: 1    prenatal vitamins (PRENATAL 27-1) 27-1 MG tablet tablet, Take  by mouth Daily., Disp: , Rfl:     PROVENTIL  (90 BASE) MCG/ACT inhaler, INHALE 1 PUFF EVERY 4 HOURS AS NEEDED., Disp: 6.7 inhaler, Rfl: 0    Current Facility-Administered Medications:     ondansetron ODT (ZOFRAN-ODT) disintegrating tablet 4 mg, 4 mg, Translingual, Q6H PRN, Debra Lyon APRN    Allergies   Allergen Reactions    Shellfish-Derived Products Hives     N/v.    Oxycodone Hives and Itching    Shellfish Allergy Hives       Social History     Tobacco Use    Smoking status: Former     Current packs/day: 0.00     Average packs/day: 1 pack/day for 13.7 years (13.7 ttl pk-yrs)     Types: Cigarettes     Start date: 2002     Quit date: 2015     Years since quittin.6     Passive exposure: Never    Smokeless tobacco: Never   Vaping Use    Vaping status: Former   Substance Use Topics    Alcohol use: Not Currently     Comment: Only drink maybe once a month    Drug use: Not Currently     Frequency: 3.0 times per week     Types: Marijuana       Family History   Problem Relation Age of Onset    Hypertension Father     Breast cancer Maternal Grandmother     Cancer Maternal Grandmother         She had breast cancer in  but it's in remission    Hypertension Maternal Grandfather     Asthma Maternal Grandfather     Ovarian cancer Neg Hx     Uterine cancer Neg Hx     Colon cancer Neg Hx        Review of Systems   Constitutional:  Negative for fatigue and fever.   Genitourinary:  Negative for menstrual problem, pelvic pain and urinary incontinence.       /72   Ht 172.7 cm (68\")   Wt 132 kg (291 lb)   LMP 2025 (Approximate)   BMI 44.25 kg/m²     Physical " Exam  Constitutional:       Appearance: She is obese.   Genitourinary:      Bladder and urethral meatus normal.      No lesions in the vagina.      Right Labia: No lesions.     Left Labia: No lesions.     No vaginal discharge, tenderness or bleeding.      No vaginal prolapse present.     No vaginal atrophy present.       Right Adnexa: not tender, not full and no mass present.     Left Adnexa: not tender, not full and no mass present.     No cervical motion tenderness, discharge, friability or lesion.      Uterus is not enlarged, fixed or tender.      No uterine mass detected.     Uterus is midaxial.   Breasts:     Right: No mass, nipple discharge, skin change or tenderness.      Left: No mass, nipple discharge, skin change or tenderness.   Abdominal:      General: Abdomen is flat.      Palpations: Abdomen is soft. There is no mass.      Tenderness: There is no abdominal tenderness.   Neurological:      Mental Status: She is alert.   Vitals reviewed.               Assessment     1) GYN annual well woman exam.   2) normal GYN annual.  Mammogram next year.  Pap every 3 years is okay.     Plan     1) Breast Health - Clinical breast exam yearly, Discussed American cancer society recommendations for breast cancer screening, and Self breast awareness monthly  2) Pap -done today with high-risk HPV  3) Smoking status-negative  4) Encouraged to be wary of information obtained via social media and internet based on source and search.  5) Follow up prn and one year.       Darius Uribe MD   7/18/2025  09:33 EDT

## 2025-07-23 LAB
CYTOLOGIST CVX/VAG CYTO: NORMAL
CYTOLOGY CVX/VAG DOC CYTO: NORMAL
DX ICD CODE: NORMAL
HPV GENOTYPE REFLEX: NORMAL
HPV I/H RISK 4 DNA CVX QL PROBE+SIG AMP: NEGATIVE
OTHER STN SPEC: NORMAL
SERVICE CMNT-IMP: NORMAL
STAT OF ADQ CVX/VAG CYTO-IMP: NORMAL

## 2025-07-27 DIAGNOSIS — E11.65 TYPE 2 DIABETES MELLITUS WITH HYPERGLYCEMIA, WITHOUT LONG-TERM CURRENT USE OF INSULIN: ICD-10-CM

## 2025-07-28 RX ORDER — METFORMIN HYDROCHLORIDE 500 MG/1
1000 TABLET, EXTENDED RELEASE ORAL 2 TIMES DAILY WITH MEALS
Qty: 360 TABLET | Refills: 1 | Status: SHIPPED | OUTPATIENT
Start: 2025-07-28

## 2025-08-01 ENCOUNTER — OFFICE VISIT (OUTPATIENT)
Dept: FAMILY MEDICINE CLINIC | Facility: CLINIC | Age: 40
End: 2025-08-01
Payer: COMMERCIAL

## 2025-08-01 ENCOUNTER — PRIOR AUTHORIZATION (OUTPATIENT)
Dept: FAMILY MEDICINE CLINIC | Facility: CLINIC | Age: 40
End: 2025-08-01
Payer: COMMERCIAL

## 2025-08-01 VITALS
TEMPERATURE: 98.7 F | DIASTOLIC BLOOD PRESSURE: 70 MMHG | HEART RATE: 99 BPM | WEIGHT: 286.8 LBS | BODY MASS INDEX: 43.47 KG/M2 | SYSTOLIC BLOOD PRESSURE: 130 MMHG | HEIGHT: 68 IN | OXYGEN SATURATION: 97 %

## 2025-08-01 DIAGNOSIS — F41.8 POSTPARTUM ANXIETY: ICD-10-CM

## 2025-08-01 DIAGNOSIS — E11.65 TYPE 2 DIABETES MELLITUS WITH HYPERGLYCEMIA, WITHOUT LONG-TERM CURRENT USE OF INSULIN: Primary | ICD-10-CM

## 2025-08-01 DIAGNOSIS — E66.813 CLASS 3 SEVERE OBESITY WITH SERIOUS COMORBIDITY AND BODY MASS INDEX (BMI) OF 40.0 TO 44.9 IN ADULT, UNSPECIFIED OBESITY TYPE: ICD-10-CM

## 2025-08-01 NOTE — PROGRESS NOTES
Chief Complaint  Diabetes (Patient is fasting)    Subjective        Lavern Rehman presents to [facility] PRIMARY CARE  Diabetes  Diabetes type:  Type 2  MedicAlert ID: no    Disease duration:  19 years  Associated symptoms:     polydipsia      no blurred vision, no foot paresthesias, no foot ulcerations, no polyuria and no weight loss    Symptom course:  Stable  Hypoglycemia symptoms:     no confusion, no headaches, no speech difficulty, no sweats and no tremors    Hypoglycemia complications:     no blackouts, no hospitalization, no nocturnal hypoglycemia, no required assistance and no required glucagon injection    Treatment compliance:  Most of the time  Current diet:  Generally unhealthy  Meal planning:  Calorie counting  Exercise:  Never  Eye exam current: yes    Sees podiatrist: no           The patient presents for a 3-month follow-up to recheck her A1c and discuss weight management, postpartum anxiety, and adverse reaction to Wegovy.    She has not made any changes to her medication regimen and is currently taking metformin and Actos. She expressed interest in trying Mounjaro, a medication her mother is currently using. She has a preference for fast food over healthier options. 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 She is also on birth control to prevent pregnancy and manage her PCOS. She has previously been on Bydureon and insulin during her pregnancy.    Despite efforts, significant weight loss has not been achieved due to postpartum anxiety affecting her eating habits. She has lost some weight since her last visit but not as much as she had hoped. She has a history of rapid weight loss, having lost 60 pounds in 6 months through calorie counting, but regained the weight following knee surgery and pregnancy. She describes herself as morbidly obese and struggles with body image issues.    Postpartum  "anxiety has led to poor eating habits, often turning to sweets when anxiety intensifies. She is scheduled to see a mental health professional at [facility] on 08/25/2025. She has tried Zoloft for anxiety but found it unhelpful. A gene test conducted 10 to 12 years ago indicated that Celexa and Paxil may not be suitable. She was on Zoloft for the first 6 months after giving birth but discontinued it due to emotional numbness.    She had an adverse reaction to Wegovy, experiencing a 6-hour panic attack, severe headache, and shaking, making her hesitant to try new medications.    She received steroid injections from an orthopedic doctor for her back pain, which have not provided significant relief yet.    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    Weight as of this encounter: 130 kg (286 lb 12.8 oz).            Physical Exam  Constitutional:       Appearance: Normal appearance. She is obese.   HENT:      Head: Normocephalic.      Nose: Nose normal.   Eyes:      Extraocular Movements: Extraocular movements intact.      Pupils: Pupils are equal, round, and reactive to light.   Cardiovascular:      Rate and Rhythm: Normal rate and regular rhythm.      Heart sounds: Normal heart sounds.   Pulmonary:      Effort: Pulmonary effort is normal.      Breath sounds: Normal breath sounds.   Musculoskeletal:         General: Normal range of motion.      Cervical back: Normal range of motion.   Skin:     General: Skin is warm and dry. "   Neurological:      General: No focal deficit present.      Mental Status: She is alert and oriented to person, place, and time.   Psychiatric:         Mood and Affect: Mood normal.        Result Review :              Labs   - A1c: 9.6           Assessment and Plan   Diagnoses and all orders for this visit:    1. Type 2 diabetes mellitus with hyperglycemia, without long-term current use of insulin (Primary)  -     Tirzepatide 2.5 MG/0.5ML solution auto-injector; Inject 2.5 mg under the skin into the appropriate area as directed 1 (One) Time Per Week.  Dispense: 2 mL; Refill: 1  -     Hemoglobin A1c    2. Class 3 severe obesity with serious comorbidity and body mass index (BMI) of 40.0 to 44.9 in adult, unspecified obesity type    3. Postpartum anxiety           1. Diabetes mellitus.  - Her A1c levels have shown minimal improvement, remaining around 9 or higher.  - The potential benefits of Mounjaro in managing impulse eating and promoting satiety were discussed.  - A prescription for Mounjaro was sent to the pharmacy. She was informed about the potential side effects of Mounjaro, including nausea and occasional vomiting, especially post-injection and with dosage increases. She was also cautioned that Mounjaro may reduce the effectiveness of her birth control, necessitating the use of condoms during ovulation.  - An A1c test will be conducted today. If she experiences severe nausea, she should inform us so that a prescription for Zofran can be provided.    2. Weight management.  - She has experienced a slow but steady weight loss, decreasing from 301 pounds in 12/2024 to 286 pounds currently.  - The importance of maintaining a slow and sustainable weight loss was emphasized.  - She was encouraged to be more intentional about counting calories and following a healthier diet to maximize the benefit of weight loss.  - The potential benefits of Mounjaro in aiding weight loss were discussed.    3. Postpartum anxiety.  -  She reported experiencing postpartum anxiety, which has affected her eating habits and overall well-being.  - [facility].  - The potential impact of Mounjaro on anxiety was discussed, and she was reassured that while some people may experience increased anxiety, panic attacks are uncommon.  - Previous use of Zoloft was mentioned, but it was not effective. Alternative medications were discussed, considering her gene test results.    4. Adverse reaction to Wegovy.  - She had a severe adverse reaction to Wegovy, including a 6-hour panic attack, which has made her hesitant to try new medications.  - The potential benefits and risks of Mounjaro were discussed, and she was reassured that it might not trigger the same reaction.  - The possibility of initiating Mounjaro therapy was considered, with a plan to monitor for any adverse reactions.           Follow Up   Return in about 3 months (around 11/1/2025) for Next scheduled follow up.  Patient was given instructions and counseling regarding her condition or for health maintenance advice. Please see specific information pulled into the AVS if appropriate.   Patient or patient representative verbalized consent for the use of Ambient Listening during the visit with  CELINE Casillas for chart documentation. 8/1/2025  10:10 EDT

## 2025-08-01 NOTE — TELEPHONE ENCOUNTER
P.A. INITIATED TODAY FOR PATIENTS [employer]EP[employer] - AWAITING INSURANCE RESPONSE.    APPROVED 08/01/25 - 04/01/26

## 2025-08-02 LAB — HBA1C MFR BLD: 10.5 % (ref 4.8–5.6)

## 2025-08-05 DIAGNOSIS — E11.65 TYPE 2 DIABETES MELLITUS WITH HYPERGLYCEMIA, WITHOUT LONG-TERM CURRENT USE OF INSULIN: Primary | ICD-10-CM

## 2025-08-05 RX ORDER — TIRZEPATIDE 2.5 MG/.5ML
2.5 INJECTION, SOLUTION SUBCUTANEOUS
Qty: 2 ML | Refills: 2 | Status: SHIPPED | OUTPATIENT
Start: 2025-08-05

## 2025-08-26 ENCOUNTER — TELEMEDICINE (OUTPATIENT)
Dept: PSYCHIATRY | Facility: CLINIC | Age: 40
End: 2025-08-26
Payer: COMMERCIAL

## 2025-08-26 DIAGNOSIS — F51.5 NIGHTMARES: ICD-10-CM

## 2025-08-26 DIAGNOSIS — F41.1 GENERALIZED ANXIETY DISORDER: Primary | Chronic | ICD-10-CM

## 2025-08-26 DIAGNOSIS — F51.05 INSOMNIA DUE TO MENTAL CONDITION: ICD-10-CM

## 2025-08-26 DIAGNOSIS — F33.0 MILD EPISODE OF RECURRENT MAJOR DEPRESSIVE DISORDER: Chronic | ICD-10-CM

## 2025-08-26 PROCEDURE — 90792 PSYCH DIAG EVAL W/MED SRVCS: CPT | Performed by: NURSE PRACTITIONER

## 2025-08-26 RX ORDER — FLUOXETINE 10 MG/1
CAPSULE ORAL
Qty: 60 CAPSULE | Refills: 0 | Status: SHIPPED | OUTPATIENT
Start: 2025-08-26

## 2025-08-26 RX ORDER — PRAZOSIN HYDROCHLORIDE 1 MG/1
1 CAPSULE ORAL NIGHTLY
Qty: 60 CAPSULE | Refills: 0 | Status: SHIPPED | OUTPATIENT
Start: 2025-08-26

## (undated) DEVICE — RETR PANNUS PANNI ADHS 1P/U LF STRL

## (undated) DEVICE — ANTIBACTERIAL UNDYED BRAIDED (POLYGLACTIN 910), SYNTHETIC ABSORBABLE SUTURE: Brand: COATED VICRYL

## (undated) DEVICE — SOL IRR H2O BO 1000ML STRL

## (undated) DEVICE — SUT PLAIN  3/0 CT1 27IN 842H

## (undated) DEVICE — GLV SURG BIOGEL LTX PF 7 1/2